# Patient Record
Sex: FEMALE | Race: WHITE | NOT HISPANIC OR LATINO | Employment: OTHER | URBAN - METROPOLITAN AREA
[De-identification: names, ages, dates, MRNs, and addresses within clinical notes are randomized per-mention and may not be internally consistent; named-entity substitution may affect disease eponyms.]

---

## 2017-03-03 RX ORDER — TRIAMTERENE AND HYDROCHLOROTHIAZIDE 37.5; 25 MG/1; MG/1
CAPSULE ORAL
Qty: 90 CAPSULE | Refills: 0 | OUTPATIENT
Start: 2017-03-03

## 2017-03-07 ENCOUNTER — OFFICE VISIT (OUTPATIENT)
Dept: FAMILY MEDICINE CLINIC | Facility: CLINIC | Age: 67
End: 2017-03-07

## 2017-03-07 VITALS
OXYGEN SATURATION: 95 % | TEMPERATURE: 98.4 F | BODY MASS INDEX: 29.89 KG/M2 | HEIGHT: 66 IN | RESPIRATION RATE: 16 BRPM | SYSTOLIC BLOOD PRESSURE: 120 MMHG | DIASTOLIC BLOOD PRESSURE: 78 MMHG | HEART RATE: 67 BPM | WEIGHT: 186 LBS

## 2017-03-07 DIAGNOSIS — R94.6 THYROID FUNCTION TEST ABNORMAL: ICD-10-CM

## 2017-03-07 DIAGNOSIS — R60.0 LOCALIZED EDEMA: ICD-10-CM

## 2017-03-07 DIAGNOSIS — I10 BENIGN ESSENTIAL HYPERTENSION: Primary | ICD-10-CM

## 2017-03-07 PROCEDURE — 99213 OFFICE O/P EST LOW 20 MIN: CPT | Performed by: PHYSICIAN ASSISTANT

## 2017-03-07 RX ORDER — METOPROLOL SUCCINATE 50 MG/1
50 TABLET, EXTENDED RELEASE ORAL DAILY
Qty: 90 TABLET | Refills: 1 | Status: SHIPPED | OUTPATIENT
Start: 2017-03-07 | End: 2017-09-07 | Stop reason: SDUPTHER

## 2017-03-07 RX ORDER — TRIAMTERENE AND HYDROCHLOROTHIAZIDE 37.5; 25 MG/1; MG/1
1 CAPSULE ORAL EVERY MORNING
Qty: 90 CAPSULE | Refills: 1 | Status: SHIPPED | OUTPATIENT
Start: 2017-03-07 | End: 2017-08-31 | Stop reason: SDUPTHER

## 2017-03-07 NOTE — PROGRESS NOTES
Subjective   Onelia Alegre is a 66 y.o. female.     History of Present Illness   Onelia Alegre 66 y.o. female who presents today for routine follow up check and medication refills.  she has a history of   Patient Active Problem List   Diagnosis   • Benign essential hypertension   • Osteopenia   • Excess weight   • Localized edema   • Thyroid function test abnormal   .  Since the last visit, she has overall felt well.  She has Hypertenision and is well controlled on medication.  she has been compliant with current medications have reviewed them.  The patient denies medication side effects.    Edema is responding to Dyazide  I still need those labs  She is on Toprol for palpitations      The following portions of the patient's history were reviewed and updated as appropriate: allergies, current medications, past family history, past medical history, past social history, past surgical history and problem list.    Review of Systems   Constitutional: Negative for activity change, appetite change and unexpected weight change.   HENT: Negative for nosebleeds and trouble swallowing.    Eyes: Negative for pain and visual disturbance.   Respiratory: Negative for chest tightness, shortness of breath and wheezing.    Cardiovascular: Negative for chest pain and palpitations.   Gastrointestinal: Negative for abdominal pain and blood in stool.   Endocrine: Negative.    Genitourinary: Negative for difficulty urinating and hematuria.   Musculoskeletal: Negative for joint swelling.   Skin: Negative for color change and rash.   Allergic/Immunologic: Negative.    Neurological: Negative for syncope and speech difficulty.   Hematological: Negative for adenopathy.   Psychiatric/Behavioral: Negative for agitation and confusion.   All other systems reviewed and are negative.      Objective   Physical Exam   Constitutional: She is oriented to person, place, and time. She appears well-developed and well-nourished. No distress.    HENT:   Head: Normocephalic and atraumatic.   Eyes: Conjunctivae and EOM are normal. Pupils are equal, round, and reactive to light. Right eye exhibits no discharge. Left eye exhibits no discharge. No scleral icterus.   Neck: Normal range of motion. Neck supple. No tracheal deviation present. No thyromegaly present.   Cardiovascular: Normal rate, regular rhythm, normal heart sounds, intact distal pulses and normal pulses.  Exam reveals no gallop.    No murmur heard.  Trace pedal edema = bilat   Pulmonary/Chest: Effort normal and breath sounds normal. No respiratory distress. She has no wheezes. She has no rales.   Musculoskeletal: Normal range of motion.   Neurological: She is alert and oriented to person, place, and time. She exhibits normal muscle tone. Coordination normal.   Skin: Skin is warm. No rash noted. No erythema. No pallor.   Psychiatric: She has a normal mood and affect. Her behavior is normal. Judgment and thought content normal.   Nursing note and vitals reviewed.      Assessment/Plan   Problems Addressed this Visit        Cardiovascular and Mediastinum    Benign essential hypertension - Primary    Relevant Medications    metoprolol succinate XL (TOPROL-XL) 50 MG 24 hr tablet    triamterene-hydrochlorothiazide (DYAZIDE) 37.5-25 MG per capsule       Other    Localized edema    Thyroid function test abnormal

## 2017-03-07 NOTE — PATIENT INSTRUCTIONS
Low glycemic index diet  Exercise 30 minutes most days of the week  Make sure you get results on any labs or tests we ordered today  We discussed medications and how to take them as prescribed  Sleep 6-8 hours each night if possible  If you have not signed up for [employer], please activate your code ASAP from your After Visit Summary today    LDL goal <100  LDL goal if heart disease <70  HDL goal >60  Triglyceride goal <150  BP goal =<130/80  Fasting glucose <100

## 2017-03-09 DIAGNOSIS — I10 BENIGN ESSENTIAL HYPERTENSION: ICD-10-CM

## 2017-03-09 DIAGNOSIS — E78.2 MIXED HYPERLIPIDEMIA: ICD-10-CM

## 2017-03-09 DIAGNOSIS — R60.0 LOCALIZED EDEMA: ICD-10-CM

## 2017-03-09 DIAGNOSIS — R94.6 THYROID FUNCTION TEST ABNORMAL: Primary | ICD-10-CM

## 2017-03-09 RX ORDER — ATORVASTATIN CALCIUM 10 MG/1
10 TABLET, FILM COATED ORAL DAILY
Qty: 30 TABLET | Refills: 11 | Status: SHIPPED | OUTPATIENT
Start: 2017-03-09 | End: 2017-09-07 | Stop reason: SDUPTHER

## 2017-04-12 ENCOUNTER — OFFICE VISIT (OUTPATIENT)
Dept: ENDOCRINOLOGY | Age: 67
End: 2017-04-12

## 2017-04-12 VITALS
RESPIRATION RATE: 16 BRPM | WEIGHT: 188.4 LBS | HEIGHT: 66 IN | DIASTOLIC BLOOD PRESSURE: 84 MMHG | BODY MASS INDEX: 30.28 KG/M2 | SYSTOLIC BLOOD PRESSURE: 128 MMHG

## 2017-04-12 DIAGNOSIS — E78.5 DYSLIPIDEMIA: ICD-10-CM

## 2017-04-12 DIAGNOSIS — M85.80 OSTEOPENIA: ICD-10-CM

## 2017-04-12 DIAGNOSIS — E03.9 PRIMARY HYPOTHYROIDISM: ICD-10-CM

## 2017-04-12 DIAGNOSIS — E55.9 VITAMIN D DEFICIENCY: ICD-10-CM

## 2017-04-12 DIAGNOSIS — I10 BENIGN ESSENTIAL HYPERTENSION: Primary | ICD-10-CM

## 2017-04-12 DIAGNOSIS — E66.3 EXCESS WEIGHT: ICD-10-CM

## 2017-04-12 DIAGNOSIS — E55.9 VITAMIN D DEFICIENCY: Primary | ICD-10-CM

## 2017-04-12 PROBLEM — E88.810 METABOLIC SYNDROME: Status: RESOLVED | Noted: 2017-04-12 | Resolved: 2017-04-12

## 2017-04-12 PROBLEM — R73.03 PREDIABETES: Status: ACTIVE | Noted: 2017-04-12

## 2017-04-12 PROBLEM — E88.81 METABOLIC SYNDROME: Status: RESOLVED | Noted: 2017-04-12 | Resolved: 2017-04-12

## 2017-04-12 PROBLEM — E88.81 METABOLIC SYNDROME: Status: ACTIVE | Noted: 2017-04-12

## 2017-04-12 PROBLEM — R73.03 PREDIABETES: Status: RESOLVED | Noted: 2017-04-12 | Resolved: 2017-04-12

## 2017-04-12 PROBLEM — E88.810 METABOLIC SYNDROME: Status: ACTIVE | Noted: 2017-04-12

## 2017-04-12 PROCEDURE — 99204 OFFICE O/P NEW MOD 45 MIN: CPT | Performed by: INTERNAL MEDICINE

## 2017-04-12 RX ORDER — LEVOTHYROXINE SODIUM 100 MCG
100 TABLET ORAL DAILY
Qty: 30 TABLET | Refills: 5 | Status: SHIPPED | OUTPATIENT
Start: 2017-04-12 | End: 2017-04-14

## 2017-04-12 NOTE — PROGRESS NOTES
"Subjective   Onelia Alegre is a 66 y.o. female seen as a new patient for abnormal thyroid function. She is having weight gain, fatigue, bulge on her neck, hoarseness in voice, clearing throat, frequency to use the restroom during the night.     History of Present Illness this is a 66-year-old female who has been referred by Ms. Raven Travis her primary care provider for further evaluation and treatment on of the abnormal thyroid function.  She has been complaining of progressive and chronic fatigue and weight gain and some degree of frustration for not being able to lose weight.  Her family history is positive for mother having had the hypothyroidism.  Her father had the: Cancer and her mother had the year during cancer.  She is on the mother of 2 healthy young lady's and the no history of gestational diabetes.  She was treated with the hormone replacement therapy after her menopause but after a few years it was a stopped.  For no clear reason.   /84  Resp 16  Ht 65.5\" (166.4 cm)  Wt 188 lb 6.4 oz (85.5 kg)  BMI 30.87 kg/m2  Allergies   Allergen Reactions   • Actonel  [Risedronate Sodium]      Other reaction(s): ARTHRALGIAS   • Penicillins      Other reaction(s): JOINT PAIN   • Sulfa Antibiotics Rash       Current Outpatient Prescriptions:   •  aspirin 81 MG EC tablet, Take 81 mg by mouth daily., Disp: , Rfl:   •  atorvastatin (LIPITOR) 10 MG tablet, Take 1 tablet by mouth Daily. For cholesterol, Disp: 30 tablet, Rfl: 11  •  Calcium Carbonate-Vitamin D (CALCIUM-VITAMIN D) 600-200 MG-UNIT capsule, Take by mouth., Disp: , Rfl:   •  cetirizine (ZyrTEC) 10 MG tablet, Take 10 mg by mouth daily. Take one tablet daily as needed, Disp: , Rfl:   •  cholecalciferol (VITAMIN D3) 1000 UNITS tablet, Take 2,000 Units by mouth Daily., Disp: , Rfl:   •  melatonin tablet, Take by mouth., Disp: , Rfl:   •  metoprolol succinate XL (TOPROL-XL) 50 MG 24 hr tablet, Take 1 tablet by mouth Daily. For BP and palpitations, Disp: " 90 tablet, Rfl: 1  •  Multiple Vitamin (MULTIVITAMIN) tablet, Take 1 tablet by mouth daily., Disp: , Rfl:   •  triamterene-hydrochlorothiazide (DYAZIDE) 37.5-25 MG per capsule, Take 1 capsule by mouth Every Morning. For BP and edema (still take Toprol XL), Disp: 90 capsule, Rfl: 1      The following portions of the patient's history were reviewed and updated as appropriate: allergies, current medications, past family history, past medical history, past social history, past surgical history and problem list.    Review of Systems   Constitutional: Positive for fatigue and unexpected weight change.   HENT: Positive for voice change.    Eyes: Negative.    Respiratory: Positive for cough.    Cardiovascular: Negative.    Gastrointestinal: Negative.    Endocrine: Negative.    Genitourinary: Positive for frequency (waking up during the night).   Musculoskeletal: Negative.    Skin: Negative.    Allergic/Immunologic: Negative.    Neurological: Negative.    Hematological: Negative.    Psychiatric/Behavioral: Positive for sleep disturbance.       Objective   Physical Exam   Constitutional: She is oriented to person, place, and time. She appears well-developed and well-nourished. No distress.   HENT:   Head: Normocephalic and atraumatic.   Right Ear: External ear normal.   Left Ear: External ear normal.   Nose: Nose normal.   Mouth/Throat: Oropharynx is clear and moist. No oropharyngeal exudate.   Eyes: Conjunctivae and EOM are normal. Pupils are equal, round, and reactive to light. Right eye exhibits no discharge. Left eye exhibits no discharge. No scleral icterus.   Neck: Normal range of motion. Neck supple. No JVD present. No tracheal deviation present. Thyromegaly present.   Very find the nodular goiter.  It most freely with swallowing and is not associated with tenderness or lymphadenopathy.   Cardiovascular: Normal rate, regular rhythm, normal heart sounds and intact distal pulses.  Exam reveals no gallop and no friction  rub.    No murmur heard.  Pulmonary/Chest: Effort normal and breath sounds normal. No stridor. No respiratory distress. She has no wheezes. She has no rales. She exhibits no tenderness.   Abdominal: Soft. Bowel sounds are normal. She exhibits no distension and no mass. There is no tenderness. There is no rebound and no guarding. No hernia.   Musculoskeletal: Normal range of motion. She exhibits no edema, tenderness or deformity.   Lymphadenopathy:     She has no cervical adenopathy.   Neurological: She is alert and oriented to person, place, and time. She has normal reflexes. She displays normal reflexes. No cranial nerve deficit. She exhibits normal muscle tone. Coordination normal.   Skin: Skin is warm and dry. No rash noted. She is not diaphoretic. No erythema. No pallor.   Psychiatric: She has a normal mood and affect. Her behavior is normal. Judgment and thought content normal.   Nursing note and vitals reviewed.        Assessment/Plan   Diagnoses and all orders for this visit:    Benign essential hypertension  -     T3, Free  -     T4 & TSH ([facility])  -     T4, Free  -     Vitamin D 25 Hydroxy  -     Uric Acid  -     Comprehensive Metabolic Panel  -     Luteinizing Hormone  -     Follicle Stimulating Hormone  -     ACTH  -     Cortisol  -     T3, Free; Future  -     T4 & TSH ([facility]); Future  -     T4, Free; Future  -     Thyroglobulin With Anti-TG; Future  -     Uric Acid; Future  -     Vitamin D 25 Hydroxy; Future  -     Lipid Panel; Future  -     Comprehensive Metabolic Panel; Future    Excess weight  -     T3, Free  -     T4 & TSH ([facility])  -     T4, Free  -     Vitamin D 25 Hydroxy  -     Uric Acid  -     Comprehensive Metabolic Panel  -     Luteinizing Hormone  -     Follicle Stimulating Hormone  -     ACTH  -     Cortisol  -     T3, Free; Future  -     T4 & TSH ([facility]); Future  -     T4, Free; Future  -     Thyroglobulin With Anti-TG; Future  -     Uric Acid; Future  -     Vitamin D 25 Hydroxy;  Future  -     Lipid Panel; Future  -     Comprehensive Metabolic Panel; Future    Osteopenia  -     T3, Free  -     T4 & TSH ([facility])  -     T4, Free  -     Vitamin D 25 Hydroxy  -     Uric Acid  -     Comprehensive Metabolic Panel  -     Luteinizing Hormone  -     Follicle Stimulating Hormone  -     ACTH  -     Cortisol  -     T3, Free; Future  -     T4 & TSH ([facility]); Future  -     T4, Free; Future  -     Thyroglobulin With Anti-TG; Future  -     Uric Acid; Future  -     Vitamin D 25 Hydroxy; Future  -     Lipid Panel; Future  -     Comprehensive Metabolic Panel; Future    Vitamin D deficiency  -     T3, Free  -     T4 & TSH ([facility])  -     T4, Free  -     Vitamin D 25 Hydroxy  -     Uric Acid  -     Comprehensive Metabolic Panel  -     Luteinizing Hormone  -     Follicle Stimulating Hormone  -     ACTH  -     Cortisol  -     T3, Free; Future  -     T4 & TSH ([facility]); Future  -     T4, Free; Future  -     Thyroglobulin With Anti-TG; Future  -     Uric Acid; Future  -     Vitamin D 25 Hydroxy; Future  -     Lipid Panel; Future  -     Comprehensive Metabolic Panel; Future    Dyslipidemia  -     T3, Free  -     T4 & TSH ([facility])  -     T4, Free  -     Vitamin D 25 Hydroxy  -     Uric Acid  -     Comprehensive Metabolic Panel  -     Luteinizing Hormone  -     Follicle Stimulating Hormone  -     ACTH  -     Cortisol  -     T3, Free; Future  -     T4 & TSH ([facility]); Future  -     T4, Free; Future  -     Thyroglobulin With Anti-TG; Future  -     Uric Acid; Future  -     Vitamin D 25 Hydroxy; Future  -     Lipid Panel; Future  -     Comprehensive Metabolic Panel; Future    Primary hypothyroidism  -     T3, Free  -     T4 & TSH ([facility])  -     T4, Free  -     Vitamin D 25 Hydroxy  -     Uric Acid  -     Comprehensive Metabolic Panel  -     Luteinizing Hormone  -     Follicle Stimulating Hormone  -     ACTH  -     Cortisol  -     T3, Free; Future  -     T4 & TSH ([facility]); Future  -     T4, Free; Future  -      Thyroglobulin With Anti-TG; Future  -     Uric Acid; Future  -     Vitamin D 25 Hydroxy; Future  -     Lipid Panel; Future  -     Comprehensive Metabolic Panel; Future    Other orders  -     SYNTHROID 100 MCG tablet; Take 1 tablet by mouth Daily. On empty stomach  -     estrogen, conjugated,-medroxyprogesterone (PREMPRO) 0.625-2.5 MG per tablet; Take 1 tablet by mouth Daily.               In summary I saw and examined this 66-year-old female for above-mentioned problems.  I reviewed her laboratory evaluation of 05/23/2016 and 03/08/2017 as well as the thyroid ultrasound of the 06/17/2016 where he shows existence of a very small nodules on both lobes of her thyroid.  and at this point we will go ahead and obtain a more comprehensive laboratory evaluation and once the results come back we will go ahead and call for any additional modification or further workup.  By virtue of the fact that she had elevated levels of TSH on 2 separate occasions I am going to go ahead and start her on Synthroid 100 µg daily. she will see Ms. Marie Nichols in 4 months with laboratory evaluation prior to her office visit.

## 2017-04-13 LAB
25(OH)D3+25(OH)D2 SERPL-MCNC: 59.2 NG/ML (ref 30–100)
ACTH PLAS-MCNC: 14.3 PG/ML (ref 7.2–63.3)
ALBUMIN SERPL-MCNC: 4.6 G/DL (ref 3.5–5.2)
ALBUMIN/GLOB SERPL: 1.8 G/DL
ALP SERPL-CCNC: 90 U/L (ref 39–117)
ALT SERPL-CCNC: 19 U/L (ref 1–33)
AST SERPL-CCNC: 22 U/L (ref 1–32)
BILIRUB SERPL-MCNC: 1.1 MG/DL (ref 0.1–1.2)
BUN SERPL-MCNC: 19 MG/DL (ref 8–23)
BUN/CREAT SERPL: 18.8 (ref 7–25)
CALCIUM SERPL-MCNC: 9.9 MG/DL (ref 8.6–10.5)
CHLORIDE SERPL-SCNC: 98 MMOL/L (ref 98–107)
CO2 SERPL-SCNC: 28.1 MMOL/L (ref 22–29)
CORTIS SERPL-MCNC: 11.2 UG/DL
CREAT SERPL-MCNC: 1.01 MG/DL (ref 0.57–1)
FSH SERPL-ACNC: 99.3 MIU/ML
GLOBULIN SER CALC-MCNC: 2.6 GM/DL
GLUCOSE SERPL-MCNC: 103 MG/DL (ref 65–99)
LH SERPL-ACNC: 49.4 MIU/ML
POTASSIUM SERPL-SCNC: 4.4 MMOL/L (ref 3.5–5.2)
PROT SERPL-MCNC: 7.2 G/DL (ref 6–8.5)
SODIUM SERPL-SCNC: 139 MMOL/L (ref 136–145)
T3FREE SERPL-MCNC: 3.6 PG/ML (ref 2–4.4)
T4 FREE SERPL-MCNC: 1.22 NG/DL (ref 0.93–1.7)
T4 SERPL-MCNC: 7.23 MCG/DL (ref 4.5–11.7)
TSH SERPL DL<=0.005 MIU/L-ACNC: 9.2 MIU/ML (ref 0.27–4.2)
URATE SERPL-MCNC: 6.3 MG/DL (ref 2.4–5.7)

## 2017-04-14 RX ORDER — LEVOTHYROXINE SODIUM 125 MCG
125 TABLET ORAL DAILY
Qty: 30 TABLET | Refills: 5 | Status: SHIPPED | OUTPATIENT
Start: 2017-04-14 | End: 2017-09-12 | Stop reason: SDUPTHER

## 2017-04-14 RX ORDER — ALLOPURINOL 100 MG/1
100 TABLET ORAL DAILY
Qty: 30 TABLET | Refills: 5 | Status: SHIPPED | OUTPATIENT
Start: 2017-04-14 | End: 2017-09-12 | Stop reason: SDUPTHER

## 2017-04-21 RX ORDER — ESTRADIOL 1 MG/1
1 TABLET ORAL DAILY
Qty: 30 TABLET | Refills: 5 | Status: SHIPPED | OUTPATIENT
Start: 2017-04-21 | End: 2017-09-12 | Stop reason: SDUPTHER

## 2017-05-15 LAB
ALBUMIN SERPL-MCNC: 4.3 G/DL (ref 3.5–5.2)
ALBUMIN/GLOB SERPL: 1.7 G/DL
ALP SERPL-CCNC: 84 U/L (ref 39–117)
ALT SERPL-CCNC: 18 U/L (ref 1–33)
AST SERPL-CCNC: 20 U/L (ref 1–32)
BILIRUB SERPL-MCNC: 0.9 MG/DL (ref 0.1–1.2)
BUN SERPL-MCNC: 16 MG/DL (ref 8–23)
BUN/CREAT SERPL: 16.8 (ref 7–25)
CALCIUM SERPL-MCNC: 10 MG/DL (ref 8.6–10.5)
CHLORIDE SERPL-SCNC: 102 MMOL/L (ref 98–107)
CHOLEST SERPL-MCNC: 123 MG/DL (ref 0–200)
CO2 SERPL-SCNC: 27.5 MMOL/L (ref 22–29)
CREAT SERPL-MCNC: 0.95 MG/DL (ref 0.57–1)
GLOBULIN SER CALC-MCNC: 2.6 GM/DL
GLUCOSE SERPL-MCNC: 89 MG/DL (ref 65–99)
HDLC SERPL-MCNC: 56 MG/DL (ref 40–60)
LDLC SERPL CALC-MCNC: 51 MG/DL (ref 0–100)
POTASSIUM SERPL-SCNC: 4.4 MMOL/L (ref 3.5–5.2)
PROT SERPL-MCNC: 6.9 G/DL (ref 6–8.5)
SODIUM SERPL-SCNC: 142 MMOL/L (ref 136–145)
TRIGL SERPL-MCNC: 79 MG/DL (ref 0–150)
VLDLC SERPL CALC-MCNC: 15.8 MG/DL (ref 5–40)

## 2017-08-02 ENCOUNTER — RESULTS ENCOUNTER (OUTPATIENT)
Dept: ENDOCRINOLOGY | Age: 67
End: 2017-08-02

## 2017-08-02 DIAGNOSIS — I10 BENIGN ESSENTIAL HYPERTENSION: ICD-10-CM

## 2017-08-02 DIAGNOSIS — M85.80 OSTEOPENIA: ICD-10-CM

## 2017-08-02 DIAGNOSIS — E55.9 VITAMIN D DEFICIENCY: ICD-10-CM

## 2017-08-02 DIAGNOSIS — E03.9 PRIMARY HYPOTHYROIDISM: ICD-10-CM

## 2017-08-02 DIAGNOSIS — E78.5 DYSLIPIDEMIA: ICD-10-CM

## 2017-08-02 DIAGNOSIS — E66.3 EXCESS WEIGHT: ICD-10-CM

## 2017-08-26 DIAGNOSIS — E55.9 VITAMIN D DEFICIENCY: ICD-10-CM

## 2017-08-26 DIAGNOSIS — E03.9 PRIMARY HYPOTHYROIDISM: Primary | ICD-10-CM

## 2017-08-26 DIAGNOSIS — E78.5 DYSLIPIDEMIA: ICD-10-CM

## 2017-08-30 ENCOUNTER — LAB (OUTPATIENT)
Dept: ENDOCRINOLOGY | Age: 67
End: 2017-08-30

## 2017-08-30 DIAGNOSIS — E78.5 DYSLIPIDEMIA: ICD-10-CM

## 2017-08-30 DIAGNOSIS — E55.9 VITAMIN D DEFICIENCY: ICD-10-CM

## 2017-08-30 DIAGNOSIS — E03.9 PRIMARY HYPOTHYROIDISM: ICD-10-CM

## 2017-08-31 RX ORDER — TRIAMTERENE AND HYDROCHLOROTHIAZIDE 37.5; 25 MG/1; MG/1
CAPSULE ORAL
Qty: 30 CAPSULE | Refills: 0 | Status: SHIPPED | OUTPATIENT
Start: 2017-08-31 | End: 2017-09-07 | Stop reason: SDUPTHER

## 2017-09-01 LAB
25(OH)D3+25(OH)D2 SERPL-MCNC: 72.5 NG/ML (ref 30–100)
ALBUMIN SERPL-MCNC: 4.6 G/DL (ref 3.5–5.2)
ALBUMIN/GLOB SERPL: 1.8 G/DL
ALP SERPL-CCNC: 100 U/L (ref 39–117)
ALT SERPL-CCNC: 17 U/L (ref 1–33)
AST SERPL-CCNC: 18 U/L (ref 1–32)
BILIRUB SERPL-MCNC: 1.3 MG/DL (ref 0.1–1.2)
BUN SERPL-MCNC: 17 MG/DL (ref 8–23)
BUN/CREAT SERPL: 16.7 (ref 7–25)
CALCIUM SERPL-MCNC: 9.9 MG/DL (ref 8.6–10.5)
CHLORIDE SERPL-SCNC: 99 MMOL/L (ref 98–107)
CHOLEST SERPL-MCNC: 138 MG/DL (ref 0–200)
CO2 SERPL-SCNC: 25.7 MMOL/L (ref 22–29)
CREAT SERPL-MCNC: 1.02 MG/DL (ref 0.57–1)
FT4I SERPL CALC-MCNC: 3.3 (ref 1.2–4.9)
GLOBULIN SER CALC-MCNC: 2.6 GM/DL
GLUCOSE SERPL-MCNC: 98 MG/DL (ref 65–99)
HDLC SERPL-MCNC: 58 MG/DL (ref 40–60)
LDLC SERPL CALC-MCNC: 55 MG/DL (ref 0–100)
POTASSIUM SERPL-SCNC: 4.4 MMOL/L (ref 3.5–5.2)
PROT SERPL-MCNC: 7.2 G/DL (ref 6–8.5)
SODIUM SERPL-SCNC: 141 MMOL/L (ref 136–145)
T3FREE SERPL-MCNC: 3.4 PG/ML (ref 2–4.4)
T3RU NFR SERPL: 33 % (ref 24–39)
T4 FREE SERPL-MCNC: 2.13 NG/DL (ref 0.93–1.7)
T4 SERPL-MCNC: 10.1 UG/DL (ref 4.5–12)
THYROGLOB AB SERPL-ACNC: <1 IU/ML
THYROGLOB SERPL-MCNC: 15.8 NG/ML
THYROGLOB SERPL-MCNC: NORMAL NG/ML
TRIGL SERPL-MCNC: 125 MG/DL (ref 0–150)
TSH SERPL DL<=0.005 MIU/L-ACNC: 0.99 UIU/ML (ref 0.45–4.5)
VLDLC SERPL CALC-MCNC: 25 MG/DL (ref 5–40)

## 2017-09-07 ENCOUNTER — OFFICE VISIT (OUTPATIENT)
Dept: FAMILY MEDICINE CLINIC | Facility: CLINIC | Age: 67
End: 2017-09-07

## 2017-09-07 VITALS
TEMPERATURE: 98 F | SYSTOLIC BLOOD PRESSURE: 110 MMHG | HEART RATE: 86 BPM | HEIGHT: 66 IN | BODY MASS INDEX: 29.65 KG/M2 | OXYGEN SATURATION: 97 % | RESPIRATION RATE: 16 BRPM | WEIGHT: 184.5 LBS | DIASTOLIC BLOOD PRESSURE: 70 MMHG

## 2017-09-07 DIAGNOSIS — Z23 ENCOUNTER FOR IMMUNIZATION: ICD-10-CM

## 2017-09-07 DIAGNOSIS — E78.5 DYSLIPIDEMIA: ICD-10-CM

## 2017-09-07 DIAGNOSIS — I10 BENIGN ESSENTIAL HYPERTENSION: ICD-10-CM

## 2017-09-07 DIAGNOSIS — Z12.31 ENCOUNTER FOR SCREENING MAMMOGRAM FOR BREAST CANCER: Primary | ICD-10-CM

## 2017-09-07 PROCEDURE — 90686 IIV4 VACC NO PRSV 0.5 ML IM: CPT | Performed by: PHYSICIAN ASSISTANT

## 2017-09-07 PROCEDURE — G0008 ADMIN INFLUENZA VIRUS VAC: HCPCS | Performed by: PHYSICIAN ASSISTANT

## 2017-09-07 PROCEDURE — G0438 PPPS, INITIAL VISIT: HCPCS | Performed by: PHYSICIAN ASSISTANT

## 2017-09-07 PROCEDURE — 99213 OFFICE O/P EST LOW 20 MIN: CPT | Performed by: PHYSICIAN ASSISTANT

## 2017-09-07 RX ORDER — METOPROLOL SUCCINATE 50 MG/1
50 TABLET, EXTENDED RELEASE ORAL DAILY
Qty: 90 TABLET | Refills: 1 | Status: SHIPPED | OUTPATIENT
Start: 2017-09-07 | End: 2017-09-12 | Stop reason: SDUPTHER

## 2017-09-07 RX ORDER — TRIAMTERENE AND HYDROCHLOROTHIAZIDE 37.5; 25 MG/1; MG/1
1 CAPSULE ORAL EVERY MORNING
Qty: 90 CAPSULE | Refills: 1 | Status: SHIPPED | OUTPATIENT
Start: 2017-09-07 | End: 2018-03-24 | Stop reason: SDUPTHER

## 2017-09-07 RX ORDER — ATORVASTATIN CALCIUM 10 MG/1
10 TABLET, FILM COATED ORAL DAILY
Qty: 90 TABLET | Refills: 3 | Status: SHIPPED | OUTPATIENT
Start: 2017-09-07 | End: 2017-09-12 | Stop reason: SDUPTHER

## 2017-09-07 NOTE — PROGRESS NOTES
QUICK REFERENCE INFORMATION:  The ABCs of the Annual Wellness Visit    Initial Medicare Wellness Visit    HEALTH RISK ASSESSMENT    1950    Recent Hospitalizations:  No hospitalization(s) within the last year..        Current Medical Providers:  Patient Care Team:  Raven Travis PA-C as PCP - General (Family Medicine)        Smoking Status:  History   Smoking Status   • Never Smoker   Smokeless Tobacco   • Never Used       Alcohol Consumption:  History   Alcohol Use   • Yes     Comment: social       Depression Screen:   PHQ-2/PHQ-9 Depression Screening 9/7/2017   Little interest or pleasure in doing things 0   Feeling down, depressed, or hopeless 0   Total Score 0       Health Habits and Functional and Cognitive Screening:  Functional & Cognitive Status 9/7/2017   Do you have difficulty preparing food and eating? No   Do you have difficulty bathing yourself? No   Do you have difficulty getting dressed? No   Do you have difficulty using the toilet? No   Do you have difficulty moving around from place to place? No   In the past year have you fallen or experienced a near fall? No   Do you need help using the phone?  No   Are you deaf or do you have serious difficulty hearing?  No   Do you need help with transportation? No   Do you need help shopping? No   Do you need help preparing meals?  No   Do you need help with housework?  No   Do you need help with laundry? No   Do you need help taking your medications? No   Do you need help managing money? No   Do you have difficulty concentrating, remembering or making decisions? No       Health Habits  Current Diet: Low Fat Diet  Dental Exam: Up to date  Eye Exam: Up to date  Exercise (times per week): 2 times per week  Current Exercise Activities Include: Walking          Does the patient have evidence of cognitive impairment? No    Asiprin use counseling: Taking ASA appropriately as indicated      Recent Lab Results:    Visual Acuity:  No exam data  present    Age-appropriate Screening Schedule:  Refer to the list below for future screening recommendations based on patient's age, sex and/or medical conditions. Orders for these recommended tests are listed in the plan section. The patient has been provided with a written plan.    Health Maintenance   Topic Date Due   • INFLUENZA VACCINE  10/07/2017 (Originally 8/1/2017)   • MAMMOGRAM  11/07/2017 (Originally 8/25/2017)   • PNEUMOCOCCAL VACCINES (65+ LOW/MEDIUM RISK) (2 of 2 - PPSV23) 12/07/2017   • COLONOSCOPY  01/15/2018   • DXA SCAN  07/20/2018   • LIPID PANEL  08/30/2018   • TDAP/TD VACCINES (3 - Td) 12/07/2026   • ZOSTER VACCINE  Completed        Subjective   History of Present Illness    Onelia Alegre is a 66 y.o. female who presents for an Annual Wellness Visit.    The following portions of the patient's history were reviewed and updated as appropriate: allergies, current medications, past family history, past medical history, past social history, past surgical history and problem list.    Outpatient Medications Prior to Visit   Medication Sig Dispense Refill   • allopurinol (ZYLOPRIM) 100 MG tablet Take 1 tablet by mouth Daily. 30 tablet 5   • aspirin 81 MG EC tablet Take 81 mg by mouth daily.     • Calcium Carbonate-Vitamin D (CALCIUM-VITAMIN D) 600-200 MG-UNIT capsule Take by mouth.     • cetirizine (ZyrTEC) 10 MG tablet Take 10 mg by mouth daily. Take one tablet daily as needed     • cholecalciferol (VITAMIN D3) 1000 UNITS tablet Take 2,000 Units by mouth Daily.     • estradiol (ESTRACE) 1 MG tablet Take 1 tablet by mouth Daily. 30 tablet 5   • melatonin tablet Take by mouth.     • Multiple Vitamin (MULTIVITAMIN) tablet Take 1 tablet by mouth daily.     • progesterone (PROMETRIUM) 200 MG capsule Take 1 capsule by mouth Daily. 30 capsule 5   • SYNTHROID 125 MCG tablet Take 1 tablet by mouth Daily. On empty stomach 30 tablet 5   • atorvastatin (LIPITOR) 10 MG tablet Take 1 tablet by mouth Daily. For  "cholesterol 30 tablet 11   • metoprolol succinate XL (TOPROL-XL) 50 MG 24 hr tablet Take 1 tablet by mouth Daily. For BP and palpitations 90 tablet 1   • triamterene-hydrochlorothiazide (DYAZIDE) 37.5-25 MG per capsule TAKE ONE CAPSULE BY MOUTH EVERY MORNING FOR BLOOD PRESSURE AND EDEMA 30 capsule 0     No facility-administered medications prior to visit.        Patient Active Problem List   Diagnosis   • Benign essential hypertension   • Osteopenia   • Excess weight   • Localized edema   • Thyroid function test abnormal   • Primary hypothyroidism   • Vitamin D deficiency   • Dyslipidemia       Advance Care Planning:  has an advance directive - a copy HAS NOT been provided. Have asked the patient to send this to us to add to record.    Identification of Risk Factors:  Risk factors include: weight .    Review of Systems    Compared to one year ago, the patient feels her physical health is the same.  Compared to one year ago, the patient feels her mental health is the same.    Objective     Physical Exam    Vitals:    09/07/17 1019   BP: 110/70   BP Location: Left arm   Patient Position: Sitting   Cuff Size: Large Adult   Pulse: 86   Resp: 16   Temp: 98 °F (36.7 °C)   TempSrc: Oral   SpO2: 97%   Weight: 184 lb 8 oz (83.7 kg)   Height: 65.5\" (166.4 cm)   PainSc: 0-No pain       Body mass index is 30.24 kg/(m^2).  Discussed the patient's BMI with her. The BMI is above average; BMI management plan is completed.    Assessment/Plan   Patient Self-Management and Personalized Health Advice  The patient has been provided with information about: exercise and weight management and preventive services including:   · Screening mammography, referral placed.    Visit Diagnoses:    ICD-10-CM ICD-9-CM   1. Encounter for screening mammogram for breast cancer Z12.31 V76.12   2. Dyslipidemia E78.5 272.4   3. Benign essential hypertension I10 401.1   4. Encounter for immunization  Z23 V03.89       Orders Placed This Encounter "   Procedures   • Mammo Screening Bilateral With CAD     Standing Status:   Future     Standing Expiration Date:   9/7/2018     Scheduling Instructions:      [facility] [address]     Order Specific Question:   Reason for Exam:     Answer:   Yearly exam   • Flu Vaccine Quad PF 3YR+       Outpatient Encounter Prescriptions as of 9/7/2017   Medication Sig Dispense Refill   • allopurinol (ZYLOPRIM) 100 MG tablet Take 1 tablet by mouth Daily. 30 tablet 5   • aspirin 81 MG EC tablet Take 81 mg by mouth daily.     • atorvastatin (LIPITOR) 10 MG tablet Take 1 tablet by mouth Daily. For cholesterol 90 tablet 3   • Calcium Carbonate-Vitamin D (CALCIUM-VITAMIN D) 600-200 MG-UNIT capsule Take by mouth.     • cetirizine (ZyrTEC) 10 MG tablet Take 10 mg by mouth daily. Take one tablet daily as needed     • cholecalciferol (VITAMIN D3) 1000 UNITS tablet Take 2,000 Units by mouth Daily.     • estradiol (ESTRACE) 1 MG tablet Take 1 tablet by mouth Daily. 30 tablet 5   • melatonin tablet Take by mouth.     • metoprolol succinate XL (TOPROL-XL) 50 MG 24 hr tablet Take 1 tablet by mouth Daily. For BP and palpitations 90 tablet 1   • Multiple Vitamin (MULTIVITAMIN) tablet Take 1 tablet by mouth daily.     • progesterone (PROMETRIUM) 200 MG capsule Take 1 capsule by mouth Daily. 30 capsule 5   • SYNTHROID 125 MCG tablet Take 1 tablet by mouth Daily. On empty stomach 30 tablet 5   • triamterene-hydrochlorothiazide (DYAZIDE) 37.5-25 MG per capsule Take 1 capsule by mouth Every Morning. For BP and edema 90 capsule 1   • [DISCONTINUED] atorvastatin (LIPITOR) 10 MG tablet Take 1 tablet by mouth Daily. For cholesterol 30 tablet 11   • [DISCONTINUED] metoprolol succinate XL (TOPROL-XL) 50 MG 24 hr tablet Take 1 tablet by mouth Daily. For BP and palpitations 90 tablet 1   • [DISCONTINUED] triamterene-hydrochlorothiazide (DYAZIDE) 37.5-25 MG per capsule TAKE ONE CAPSULE BY MOUTH EVERY MORNING FOR BLOOD PRESSURE AND EDEMA 30 capsule 0     No  facility-administered encounter medications on file as of 9/7/2017.        Reviewed use of high risk medication in the elderly: yes  Reviewed for potential of harmful drug interactions in the elderly: yes    Follow Up:  Return in about 6 months (around 3/7/2018), or if symptoms worsen or fail to improve.     An After Visit Summary and PPPS with all of these plans were given to the patient.

## 2017-09-07 NOTE — PATIENT INSTRUCTIONS
Low glycemic index diet  Exercise 30 minutes most days of the week  Make sure you get results on any labs or tests we ordered today  We discussed medications and how to take them as prescribed  Sleep 6-8 hours each night if possible  If you have not signed up for [employer], please activate your code ASAP from your After Visit Summary today    LDL goal <100  LDL goal if heart disease <70  HDL goal >60  Triglyceride goal <150  BP goal =<130/80  Fasting glucose <100        Exercising to Lose Weight  Exercising can help you to lose weight. In order to lose weight through exercise, you need to do vigorous-intensity exercise. You can tell that you are exercising with vigorous intensity if you are breathing very hard and fast and cannot hold a conversation while exercising.  Moderate-intensity exercise helps to maintain your current weight. You can tell that you are exercising at a moderate level if you have a higher heart rate and faster breathing, but you are still able to hold a conversation.  HOW OFTEN SHOULD I EXERCISE?  Choose an activity that you enjoy and set realistic goals. Your health care provider can help you to make an activity plan that works for you. Exercise regularly as directed by your health care provider. This may include:  · Doing resistance training twice each week, such as:    Push-ups.    Sit-ups.    Lifting weights.    Using resistance bands.  · Doing a given intensity of exercise for a given amount of time. Choose from these options:    150 minutes of moderate-intensity exercise every week.    75 minutes of vigorous-intensity exercise every week.    A mix of moderate-intensity and vigorous-intensity exercise every week.  Children, pregnant women, people who are out of shape, people who are overweight, and older adults may need to consult a health care provider for individual recommendations. If you have any sort of medical condition, be sure to consult your health care provider before starting a  new exercise program.  WHAT ARE SOME ACTIVITIES THAT CAN HELP ME TO LOSE WEIGHT?   · Walking at a rate of at least 4.5 miles an hour.  · Jogging or running at a rate of 5 miles per hour.  · Biking at a rate of at least 10 miles per hour.  · Lap swimming.  · Roller-skating or in-line skating.  · Cross-country skiing.  · Vigorous competitive sports, such as football, basketball, and soccer.  · Jumping rope.  · Aerobic dancing.  HOW CAN I BE MORE ACTIVE IN MY DAY-TO-DAY ACTIVITIES?  · Use the stairs instead of the elevator.  · Take a walk during your lunch break.  · If you drive, park your car farther away from work or school.  · If you take public transportation, get off one stop early and walk the rest of the way.  · Make all of your phone calls while standing up and walking around.  · Get up, stretch, and walk around every 30 minutes throughout the day.  WHAT GUIDELINES SHOULD I FOLLOW WHILE EXERCISING?  · Do not exercise so much that you hurt yourself, feel dizzy, or get very short of breath.  · Consult your health care provider prior to starting a new exercise program.  · Wear comfortable clothes and shoes with good support.  · Drink plenty of water while you exercise to prevent dehydration or heat stroke. Body water is lost during exercise and must be replaced.  · Work out until you breathe faster and your heart beats faster.     This information is not intended to replace advice given to you by your health care provider. Make sure you discuss any questions you have with your health care provider.     Document Released: 01/20/2012 Document Revised: 01/08/2016 Document Reviewed: 05/21/2015  [employer] Patient Education ©2017 [employer]

## 2017-09-07 NOTE — PROGRESS NOTES
Subjective   Onelia Alegre is a 66 y.o. female.     History of Present Illness   Onelia Alegre 66 y.o. female who presents today for routine follow up check and medication refills.  she has a history of   Patient Active Problem List   Diagnosis   • Benign essential hypertension   • Osteopenia   • Excess weight   • Localized edema   • Thyroid function test abnormal   • Primary hypothyroidism   • Vitamin D deficiency   • Dyslipidemia   .  Since the last visit, she has overall felt well.  She has Hypertenision and is well controlled on medication, Hyperlipidemia and is well controlled on medication and thyroid is managed by DR Farooq.  she has been compliant with current medications have reviewed them.  The patient denies medication side effects.    Vit D was 59  I am monitoring renal functions-she is avoiding NSAIDs;;  Est CC 69  I see she is now on Allopurinol;  She also take Dyazide;  On Toprol for palpitations   No results found for: CHOL  Lab Results   Component Value Date    TRIG 125 08/30/2017    TRIG 79 05/15/2017    TRIG 106 03/08/2017     Lab Results   Component Value Date    HDL 58 08/30/2017    HDL 56 05/15/2017    HDL 61 (H) 03/08/2017     No results found for: LDLCALC  Lab Results   Component Value Date    LDL 55 08/30/2017    LDL 51 05/15/2017     (H) 03/08/2017     No results found for: HDLLDLRATIO  No components found for: CHOLHDL      The following portions of the patient's history were reviewed and updated as appropriate: allergies, current medications, past family history, past medical history, past social history, past surgical history and problem list.    Review of Systems   Constitutional: Negative for activity change, appetite change and unexpected weight change.   HENT: Negative for nosebleeds and trouble swallowing.    Eyes: Negative for pain and visual disturbance.   Respiratory: Negative for chest tightness, shortness of breath and wheezing.    Cardiovascular: Negative for  chest pain and palpitations.   Gastrointestinal: Negative for abdominal pain and blood in stool.   Endocrine: Negative.    Genitourinary: Negative for difficulty urinating and hematuria.   Musculoskeletal: Negative for joint swelling.   Skin: Negative for color change and rash.   Allergic/Immunologic: Negative.    Neurological: Negative for syncope and speech difficulty.   Hematological: Negative for adenopathy.   Psychiatric/Behavioral: Negative for agitation and confusion.   All other systems reviewed and are negative.      Objective   Physical Exam   Constitutional: She is oriented to person, place, and time. She appears well-developed and well-nourished. No distress.   HENT:   Head: Normocephalic and atraumatic.   Eyes: Conjunctivae and EOM are normal. Pupils are equal, round, and reactive to light. Right eye exhibits no discharge. Left eye exhibits no discharge. No scleral icterus.   Neck: Normal range of motion. Neck supple. No tracheal deviation present. No thyromegaly present.   Cardiovascular: Normal rate, regular rhythm, normal heart sounds, intact distal pulses and normal pulses.  Exam reveals no gallop.    No murmur heard.  Trace pedal edema = bilat     Pulmonary/Chest: Effort normal and breath sounds normal. No respiratory distress. She has no wheezes. She has no rales.   Musculoskeletal: Normal range of motion.   Neurological: She is alert and oriented to person, place, and time. She exhibits normal muscle tone. Coordination normal.   Skin: Skin is warm. No rash noted. No erythema. No pallor.   Psychiatric: She has a normal mood and affect. Her behavior is normal. Judgment and thought content normal.   Nursing note and vitals reviewed.      Assessment/Plan   Problems Addressed this Visit        Cardiovascular and Mediastinum    Benign essential hypertension    Relevant Medications    triamterene-hydrochlorothiazide (DYAZIDE) 37.5-25 MG per capsule    metoprolol succinate XL (TOPROL-XL) 50 MG 24 hr  tablet    Other Relevant Orders    Flu Vaccine Quad PF 3YR+       Other    Dyslipidemia    Relevant Orders    Flu Vaccine Quad PF 3YR+      Other Visit Diagnoses     Encounter for screening mammogram for breast cancer    -  Primary    Relevant Orders    Mammo Screening Bilateral With CAD    Flu Vaccine Quad PF 3YR+    Encounter for immunization         Relevant Orders    Flu Vaccine Quad PF 3YR+

## 2017-09-12 ENCOUNTER — OFFICE VISIT (OUTPATIENT)
Dept: ENDOCRINOLOGY | Age: 67
End: 2017-09-12

## 2017-09-12 VITALS
WEIGHT: 186.4 LBS | RESPIRATION RATE: 16 BRPM | DIASTOLIC BLOOD PRESSURE: 78 MMHG | SYSTOLIC BLOOD PRESSURE: 126 MMHG | BODY MASS INDEX: 30.55 KG/M2

## 2017-09-12 DIAGNOSIS — E78.5 DYSLIPIDEMIA: ICD-10-CM

## 2017-09-12 DIAGNOSIS — E55.9 VITAMIN D DEFICIENCY: ICD-10-CM

## 2017-09-12 DIAGNOSIS — E79.0 HYPERURICEMIA: ICD-10-CM

## 2017-09-12 DIAGNOSIS — E03.9 PRIMARY HYPOTHYROIDISM: Primary | ICD-10-CM

## 2017-09-12 DIAGNOSIS — I10 BENIGN ESSENTIAL HYPERTENSION: ICD-10-CM

## 2017-09-12 DIAGNOSIS — N95.1 SYMPTOMATIC MENOPAUSAL OR FEMALE CLIMACTERIC STATES: ICD-10-CM

## 2017-09-12 DIAGNOSIS — E66.3 EXCESS WEIGHT: ICD-10-CM

## 2017-09-12 PROCEDURE — 99214 OFFICE O/P EST MOD 30 MIN: CPT | Performed by: INTERNAL MEDICINE

## 2017-09-12 RX ORDER — LEVOTHYROXINE SODIUM 125 MCG
125 TABLET ORAL DAILY
Qty: 30 TABLET | Refills: 5 | Status: SHIPPED | OUTPATIENT
Start: 2017-09-12 | End: 2018-04-03 | Stop reason: SDUPTHER

## 2017-09-12 RX ORDER — ESTRADIOL 1 MG/1
1 TABLET ORAL DAILY
Qty: 30 TABLET | Refills: 5 | Status: SHIPPED | OUTPATIENT
Start: 2017-09-12 | End: 2018-04-03 | Stop reason: SDUPTHER

## 2017-09-12 RX ORDER — ATORVASTATIN CALCIUM 10 MG/1
10 TABLET, FILM COATED ORAL DAILY
Qty: 90 TABLET | Refills: 3 | Status: SHIPPED | OUTPATIENT
Start: 2017-09-12 | End: 2018-04-03 | Stop reason: SDUPTHER

## 2017-09-12 RX ORDER — METOPROLOL SUCCINATE 50 MG/1
50 TABLET, EXTENDED RELEASE ORAL DAILY
Qty: 90 TABLET | Refills: 3 | Status: SHIPPED | OUTPATIENT
Start: 2017-09-12 | End: 2018-09-27

## 2017-09-12 RX ORDER — ALLOPURINOL 100 MG/1
100 TABLET ORAL DAILY
Qty: 30 TABLET | Refills: 5 | Status: SHIPPED | OUTPATIENT
Start: 2017-09-12 | End: 2018-04-03 | Stop reason: SDUPTHER

## 2017-09-12 NOTE — PROGRESS NOTES
Subjective   Onelia Alegre is a 66 y.o. female seen for follow up for osteopenia, dyslipidemia, vit d deficiency, lab review. Patient denies any problems or concerns.     History of Present Illness this is a 66-year-old female known patient with hypothyroidism and dyslipidemia as well as menopausal symptoms and postmenopausal osteopenia and hyperuricemia.  Over the course of last 6 months she has had no significant health problems for which to go to emergency room or hospital.  /78  Resp 16  Wt 186 lb 6.4 oz (84.6 kg)  BMI 30.55 kg/m2  Allergies   Allergen Reactions   • Actonel  [Risedronate Sodium]      Other reaction(s): ARTHRALGIAS   • Penicillins      Other reaction(s): JOINT PAIN   • Sulfa Antibiotics Rash       Current Outpatient Prescriptions:   •  allopurinol (ZYLOPRIM) 100 MG tablet, Take 1 tablet by mouth Daily., Disp: 30 tablet, Rfl: 5  •  aspirin 81 MG EC tablet, Take 81 mg by mouth daily., Disp: , Rfl:   •  atorvastatin (LIPITOR) 10 MG tablet, Take 1 tablet by mouth Daily. For cholesterol, Disp: 90 tablet, Rfl: 3  •  Calcium Carbonate-Vitamin D (CALCIUM-VITAMIN D) 600-200 MG-UNIT capsule, Take by mouth., Disp: , Rfl:   •  cetirizine (ZyrTEC) 10 MG tablet, Take 10 mg by mouth daily. Take one tablet daily as needed, Disp: , Rfl:   •  cholecalciferol (VITAMIN D3) 1000 UNITS tablet, Take 2,000 Units by mouth Daily., Disp: , Rfl:   •  estradiol (ESTRACE) 1 MG tablet, Take 1 tablet by mouth Daily., Disp: 30 tablet, Rfl: 5  •  melatonin tablet, Take by mouth., Disp: , Rfl:   •  metoprolol succinate XL (TOPROL-XL) 50 MG 24 hr tablet, Take 1 tablet by mouth Daily. For BP and palpitations, Disp: 90 tablet, Rfl: 1  •  Multiple Vitamin (MULTIVITAMIN) tablet, Take 1 tablet by mouth daily., Disp: , Rfl:   •  progesterone (PROMETRIUM) 200 MG capsule, Take 1 capsule by mouth Daily., Disp: 30 capsule, Rfl: 5  •  SYNTHROID 125 MCG tablet, Take 1 tablet by mouth Daily. On empty stomach, Disp: 30 tablet, Rfl:  5  •  triamterene-hydrochlorothiazide (DYAZIDE) 37.5-25 MG per capsule, Take 1 capsule by mouth Every Morning. For BP and edema, Disp: 90 capsule, Rfl: 1          The following portions of the patient's history were reviewed and updated as appropriate: allergies, current medications, past family history, past medical history, past social history, past surgical history and problem list.    Review of Systems   Constitutional: Negative.    HENT: Negative.    Eyes: Negative.    Respiratory: Negative.    Cardiovascular: Negative.    Gastrointestinal: Negative.    Endocrine: Negative.    Genitourinary: Negative.    Musculoskeletal: Negative.    Skin: Negative.    Allergic/Immunologic: Negative.    Neurological: Negative.    Hematological: Negative.    Psychiatric/Behavioral: Negative.        Objective   Physical Exam   Constitutional: She is oriented to person, place, and time. She appears well-developed and well-nourished. No distress.   HENT:   Head: Normocephalic and atraumatic.   Right Ear: External ear normal.   Left Ear: External ear normal.   Nose: Nose normal.   Mouth/Throat: Oropharynx is clear and moist. No oropharyngeal exudate.   Eyes: Conjunctivae and EOM are normal. Pupils are equal, round, and reactive to light. Right eye exhibits no discharge. Left eye exhibits no discharge. No scleral icterus.   Neck: Normal range of motion. Neck supple. No JVD present. No tracheal deviation present. Thyromegaly present.   Very find the nodular goiter.  It most freely with swallowing and is not associated with tenderness or lymphadenopathy.   Cardiovascular: Normal rate, regular rhythm, normal heart sounds and intact distal pulses.  Exam reveals no gallop and no friction rub.    No murmur heard.  Pulmonary/Chest: Effort normal and breath sounds normal. No stridor. No respiratory distress. She has no wheezes. She has no rales. She exhibits no tenderness.   Abdominal: Soft. Bowel sounds are normal. She exhibits no  distension and no mass. There is no tenderness. There is no rebound and no guarding. No hernia.   Musculoskeletal: Normal range of motion. She exhibits no edema, tenderness or deformity.   Lymphadenopathy:     She has no cervical adenopathy.   Neurological: She is alert and oriented to person, place, and time. She has normal reflexes. She displays normal reflexes. No cranial nerve deficit. She exhibits normal muscle tone. Coordination normal.   Skin: Skin is warm and dry. No rash noted. She is not diaphoretic. No erythema. No pallor.   Psychiatric: She has a normal mood and affect. Her behavior is normal. Judgment and thought content normal.   Nursing note and vitals reviewed.     Results for orders placed or performed in visit on 08/30/17   Comprehensive Metabolic Panel   Result Value Ref Range    Glucose 98 65 - 99 mg/dL    BUN 17 8 - 23 mg/dL    Creatinine 1.02 (H) 0.57 - 1.00 mg/dL    eGFR Non African Am 54 (L) >60 mL/min/1.73    eGFR African Am 65 >60 mL/min/1.73    BUN/Creatinine Ratio 16.7 7.0 - 25.0    Sodium 141 136 - 145 mmol/L    Potassium 4.4 3.5 - 5.2 mmol/L    Chloride 99 98 - 107 mmol/L    Total CO2 25.7 22.0 - 29.0 mmol/L    Calcium 9.9 8.6 - 10.5 mg/dL    Total Protein 7.2 6.0 - 8.5 g/dL    Albumin 4.60 3.50 - 5.20 g/dL    Globulin 2.6 gm/dL    A/G Ratio 1.8 g/dL    Total Bilirubin 1.3 (H) 0.1 - 1.2 mg/dL    Alkaline Phosphatase 100 39 - 117 U/L    AST (SGOT) 18 1 - 32 U/L    ALT (SGPT) 17 1 - 33 U/L   Lipid Panel   Result Value Ref Range    Total Cholesterol 138 0 - 200 mg/dL    Triglycerides 125 0 - 150 mg/dL    HDL Cholesterol 58 40 - 60 mg/dL    VLDL Cholesterol 25 5 - 40 mg/dL    LDL Cholesterol  55 0 - 100 mg/dL   Vitamin D 25 Hydroxy   Result Value Ref Range    25 Hydroxy, Vitamin D 72.5 30.0 - 100.0 ng/mL   T3, Free   Result Value Ref Range    T3, Free 3.4 2.0 - 4.4 pg/mL   T4, Free   Result Value Ref Range    Free T4 2.13 (H) 0.93 - 1.70 ng/dL   Thyroid Panel With TSH   Result Value Ref  Range    TSH 0.987 0.450 - 4.500 uIU/mL    T4, Total 10.1 4.5 - 12.0 ug/dL    T3 Uptake 33 24 - 39 %    Free Thyroxine Index 3.3 1.2 - 4.9   Comprehensive Thyroglobulin   Result Value Ref Range    Thyroglobulin Ab <1.0 IU/mL    Thyroglobulin 15.8 ng/mL    Thyroglobulin (TG-ANGLE) Comment ng/mL           Assessment/Plan   Diagnoses and all orders for this visit:    Primary hypothyroidism  -     T3, Free; Future  -     T4 & TSH ([facility]); Future  -     T4, Free; Future  -     Uric Acid; Future  -     Vitamin D 25 Hydroxy; Future  -     Comprehensive Metabolic Panel; Future  -     Lipid Panel; Future  -     Thyroglobulin With Anti-TG; Future    Benign essential hypertension  -     T3, Free; Future  -     T4 & TSH ([facility]); Future  -     T4, Free; Future  -     Uric Acid; Future  -     Vitamin D 25 Hydroxy; Future  -     Comprehensive Metabolic Panel; Future  -     Lipid Panel; Future  -     Thyroglobulin With Anti-TG; Future    Vitamin D deficiency  -     T3, Free; Future  -     T4 & TSH ([facility]); Future  -     T4, Free; Future  -     Uric Acid; Future  -     Vitamin D 25 Hydroxy; Future  -     Comprehensive Metabolic Panel; Future  -     Lipid Panel; Future  -     Thyroglobulin With Anti-TG; Future    Dyslipidemia  -     T3, Free; Future  -     T4 & TSH ([facility]); Future  -     T4, Free; Future  -     Uric Acid; Future  -     Vitamin D 25 Hydroxy; Future  -     Comprehensive Metabolic Panel; Future  -     Lipid Panel; Future  -     Thyroglobulin With Anti-TG; Future    Excess weight  -     T3, Free; Future  -     T4 & TSH ([facility]); Future  -     T4, Free; Future  -     Uric Acid; Future  -     Vitamin D 25 Hydroxy; Future  -     Comprehensive Metabolic Panel; Future  -     Lipid Panel; Future  -     Thyroglobulin With Anti-TG; Future    Symptomatic menopausal or female climacteric states  -     T3, Free; Future  -     T4 & TSH ([facility]); Future  -     T4, Free; Future  -     Uric Acid; Future  -     Vitamin D 25  Hydroxy; Future  -     Comprehensive Metabolic Panel; Future  -     Lipid Panel; Future  -     Thyroglobulin With Anti-TG; Future    Hyperuricemia  -     T3, Free; Future  -     T4 & TSH ([facility]); Future  -     T4, Free; Future  -     Uric Acid; Future  -     Vitamin D 25 Hydroxy; Future  -     Comprehensive Metabolic Panel; Future  -     Lipid Panel; Future  -     Thyroglobulin With Anti-TG; Future    Other orders  -     SYNTHROID 125 MCG tablet; Take 1 tablet by mouth Daily. On empty stomach  -     progesterone (PROMETRIUM) 200 MG capsule; Take 1 capsule by mouth Daily.  -     metoprolol succinate XL (TOPROL-XL) 50 MG 24 hr tablet; Take 1 tablet by mouth Daily. For BP and palpitations  -     estradiol (ESTRACE) 1 MG tablet; Take 1 tablet by mouth Daily.  -     atorvastatin (LIPITOR) 10 MG tablet; Take 1 tablet by mouth Daily. For cholesterol  -     allopurinol (ZYLOPRIM) 100 MG tablet; Take 1 tablet by mouth Daily.             in summary I saw and examined this 66-year-old female for above-mentioned problems.  I reviewed her laboratory evaluation of 08/30/2017 and provided her with a hard copy of it.  He is clinically and metabolically stable and therefore I will see her in 6 months or sooner if needed with laboratory evaluation.  She will continue all her current prescriptions.

## 2018-01-03 ENCOUNTER — APPOINTMENT (OUTPATIENT)
Dept: WOMENS IMAGING | Facility: HOSPITAL | Age: 68
End: 2018-01-03

## 2018-01-03 PROCEDURE — 77067 SCR MAMMO BI INCL CAD: CPT | Performed by: RADIOLOGY

## 2018-03-02 ENCOUNTER — RESULTS ENCOUNTER (OUTPATIENT)
Dept: ENDOCRINOLOGY | Age: 68
End: 2018-03-02

## 2018-03-02 DIAGNOSIS — E78.5 DYSLIPIDEMIA: ICD-10-CM

## 2018-03-02 DIAGNOSIS — E66.3 EXCESS WEIGHT: ICD-10-CM

## 2018-03-02 DIAGNOSIS — E03.9 PRIMARY HYPOTHYROIDISM: ICD-10-CM

## 2018-03-02 DIAGNOSIS — N95.1 SYMPTOMATIC MENOPAUSAL OR FEMALE CLIMACTERIC STATES: ICD-10-CM

## 2018-03-02 DIAGNOSIS — I10 BENIGN ESSENTIAL HYPERTENSION: ICD-10-CM

## 2018-03-02 DIAGNOSIS — E55.9 VITAMIN D DEFICIENCY: ICD-10-CM

## 2018-03-02 DIAGNOSIS — E79.0 HYPERURICEMIA: ICD-10-CM

## 2018-03-21 LAB
25(OH)D3+25(OH)D2 SERPL-MCNC: 66.4 NG/ML (ref 30–100)
ALBUMIN SERPL-MCNC: 4.6 G/DL (ref 3.5–5.2)
ALBUMIN/GLOB SERPL: 1.9 G/DL
ALP SERPL-CCNC: 80 U/L (ref 39–117)
ALT SERPL-CCNC: 18 U/L (ref 1–33)
AST SERPL-CCNC: 21 U/L (ref 1–32)
BILIRUB SERPL-MCNC: 0.9 MG/DL (ref 0.1–1.2)
BUN SERPL-MCNC: 16 MG/DL (ref 8–23)
BUN/CREAT SERPL: 16.3 (ref 7–25)
CALCIUM SERPL-MCNC: 10 MG/DL (ref 8.6–10.5)
CHLORIDE SERPL-SCNC: 98 MMOL/L (ref 98–107)
CHOLEST SERPL-MCNC: 136 MG/DL (ref 0–200)
CO2 SERPL-SCNC: 28.4 MMOL/L (ref 22–29)
CREAT SERPL-MCNC: 0.98 MG/DL (ref 0.57–1)
GFR SERPLBLD CREATININE-BSD FMLA CKD-EPI: 56 ML/MIN/1.73
GFR SERPLBLD CREATININE-BSD FMLA CKD-EPI: 68 ML/MIN/1.73
GLOBULIN SER CALC-MCNC: 2.4 GM/DL
GLUCOSE SERPL-MCNC: 95 MG/DL (ref 65–99)
HDLC SERPL-MCNC: 57 MG/DL (ref 40–60)
INTERPRETATION: NORMAL
LDLC SERPL CALC-MCNC: 52 MG/DL (ref 0–100)
POTASSIUM SERPL-SCNC: 4.3 MMOL/L (ref 3.5–5.2)
PROT SERPL-MCNC: 7 G/DL (ref 6–8.5)
SODIUM SERPL-SCNC: 139 MMOL/L (ref 136–145)
T3FREE SERPL-MCNC: 3.2 PG/ML (ref 2–4.4)
T4 FREE SERPL-MCNC: 2.22 NG/DL (ref 0.93–1.7)
T4 SERPL-MCNC: 12.62 MCG/DL (ref 4.5–11.7)
THYROGLOB AB SERPL-ACNC: <1 IU/ML (ref 0–0.9)
THYROGLOB SERPL-MCNC: 12.4 NG/ML (ref 1.5–38.5)
TRIGL SERPL-MCNC: 134 MG/DL (ref 0–150)
TSH SERPL DL<=0.005 MIU/L-ACNC: 0.79 MIU/ML (ref 0.27–4.2)
URATE SERPL-MCNC: 4.1 MG/DL (ref 2.4–5.7)
VLDLC SERPL CALC-MCNC: 26.8 MG/DL (ref 5–40)

## 2018-03-24 RX ORDER — TRIAMTERENE AND HYDROCHLOROTHIAZIDE 37.5; 25 MG/1; MG/1
CAPSULE ORAL
Qty: 90 CAPSULE | Refills: 0 | Status: SHIPPED | OUTPATIENT
Start: 2018-03-24 | End: 2018-03-27 | Stop reason: SDUPTHER

## 2018-03-27 ENCOUNTER — OFFICE VISIT (OUTPATIENT)
Dept: FAMILY MEDICINE CLINIC | Facility: CLINIC | Age: 68
End: 2018-03-27

## 2018-03-27 VITALS
OXYGEN SATURATION: 98 % | TEMPERATURE: 98.2 F | HEART RATE: 72 BPM | RESPIRATION RATE: 16 BRPM | BODY MASS INDEX: 29.25 KG/M2 | SYSTOLIC BLOOD PRESSURE: 120 MMHG | WEIGHT: 182 LBS | DIASTOLIC BLOOD PRESSURE: 80 MMHG | HEIGHT: 66 IN

## 2018-03-27 DIAGNOSIS — I10 BENIGN ESSENTIAL HYPERTENSION: ICD-10-CM

## 2018-03-27 DIAGNOSIS — Z87.39 HISTORY OF GOUT: ICD-10-CM

## 2018-03-27 DIAGNOSIS — N28.9 RENAL INSUFFICIENCY: ICD-10-CM

## 2018-03-27 DIAGNOSIS — R60.0 LOCALIZED EDEMA: Primary | ICD-10-CM

## 2018-03-27 PROCEDURE — 99213 OFFICE O/P EST LOW 20 MIN: CPT | Performed by: PHYSICIAN ASSISTANT

## 2018-03-27 RX ORDER — TRIAMTERENE AND HYDROCHLOROTHIAZIDE 37.5; 25 MG/1; MG/1
1 CAPSULE ORAL EVERY MORNING
Qty: 90 CAPSULE | Refills: 3 | Status: SHIPPED | OUTPATIENT
Start: 2018-03-27 | End: 2018-09-27 | Stop reason: SDUPTHER

## 2018-03-27 NOTE — PATIENT INSTRUCTIONS
Low glycemic index diet  Exercise 30 minutes most days of the week  Make sure you get results on any labs or tests we ordered today  We discussed medications and how to take them as prescribed  Sleep 6-8 hours each night if possible  If you have not signed up for [employer], please activate your code ASAP from your After Visit Summary today    LDL goal <100  LDL goal if heart disease <70  HDL goal >60  Triglyceride goal <150  BP goal =<130/80  Fasting glucose <100

## 2018-03-27 NOTE — PROGRESS NOTES
Subjective   Onelia Alegre is a 67 y.o. female.     History of Present Illness     Onelia Alegre 67 y.o. female who presents today for routine follow up check and medication refills.  she has a history of   Patient Active Problem List   Diagnosis   • Benign essential hypertension   • Osteopenia   • Excess weight   • Localized edema   • Thyroid function test abnormal   • Primary hypothyroidism   • Vitamin D deficiency   • Dyslipidemia   • Hyperuricemia   • Symptomatic menopausal or female climacteric states   • Renal insufficiency   .  Since the last visit, she has overall felt well.  She has Hypertenision and is well controlled on medication, Hyperlipidemia and is well controlled on medication and Hypothyroidism and under the care of Endocrinologist.  she has been compliant with current medications have reviewed them.  The patient denies medication side effects.    Results for orders placed or performed in visit on 03/02/18   T3, Free   Result Value Ref Range    T3, Free 3.2 2.0 - 4.4 pg/mL   T4 & TSH ([facility])   Result Value Ref Range    TSH 0.790 0.270 - 4.200 mIU/mL    T4, Total 12.62 (H) 4.50 - 11.70 mcg/dL   T4, Free   Result Value Ref Range    Free T4 2.22 (H) 0.93 - 1.70 ng/dL   Uric Acid   Result Value Ref Range    Uric Acid 4.1 2.4 - 5.7 mg/dL   Vitamin D 25 Hydroxy   Result Value Ref Range    25 Hydroxy, Vitamin D 66.4 30.0 - 100.0 ng/ml   Comprehensive Metabolic Panel   Result Value Ref Range    Glucose 95 65 - 99 mg/dL    BUN 16 8 - 23 mg/dL    Creatinine 0.98 0.57 - 1.00 mg/dL    eGFR Non African Am 56 (L) >60 mL/min/1.73    eGFR African Am 68 >60 mL/min/1.73    BUN/Creatinine Ratio 16.3 7.0 - 25.0    Sodium 139 136 - 145 mmol/L    Potassium 4.3 3.5 - 5.2 mmol/L    Chloride 98 98 - 107 mmol/L    Total CO2 28.4 22.0 - 29.0 mmol/L    Calcium 10.0 8.6 - 10.5 mg/dL    Total Protein 7.0 6.0 - 8.5 g/dL    Albumin 4.60 3.50 - 5.20 g/dL    Globulin 2.4 gm/dL    A/G Ratio 1.9 g/dL    Total Bilirubin  0.9 0.1 - 1.2 mg/dL    Alkaline Phosphatase 80 39 - 117 U/L    AST (SGOT) 21 1 - 32 U/L    ALT (SGPT) 18 1 - 33 U/L   Lipid Panel   Result Value Ref Range    Total Cholesterol 136 0 - 200 mg/dL    Triglycerides 134 0 - 150 mg/dL    HDL Cholesterol 57 40 - 60 mg/dL    VLDL Cholesterol 26.8 5 - 40 mg/dL    LDL Cholesterol  52 0 - 100 mg/dL   Thyroglobulin With Anti-TG   Result Value Ref Range    Thyroglobulin Ab <1.0 0.0 - 0.9 IU/mL   Thyroglobulin By EMILIANO   Result Value Ref Range    THYROGLOBULIN BY EMILIANO 12.4 1.5 - 38.5 ng/mL   Cardiovascular Risk Assessment   Result Value Ref Range    Interpretation Note      Having more OA pain in knees; worse with stairs;  Avoiding NSAIDs;  Est CC 70  Do suggest shingles vaccine    She is on bp med for HTN and beta bl for palpitations  Dyazide for bp and edema  She is on HRT;  mammo in Jan was neg    The following portions of the patient's history were reviewed and updated as appropriate: allergies, current medications, past family history, past medical history, past social history, past surgical history and problem list.    Review of Systems   Constitutional: Negative for activity change, appetite change and unexpected weight change.   HENT: Negative for nosebleeds and trouble swallowing.    Eyes: Negative for pain and visual disturbance.   Respiratory: Negative for chest tightness, shortness of breath and wheezing.    Cardiovascular: Negative for chest pain and palpitations.   Gastrointestinal: Negative for abdominal pain and blood in stool.   Endocrine: Negative.    Genitourinary: Negative for difficulty urinating and hematuria.   Musculoskeletal: Positive for arthralgias. Negative for joint swelling.   Skin: Negative for color change and rash.   Allergic/Immunologic: Negative.    Neurological: Negative for syncope and speech difficulty.   Hematological: Negative for adenopathy.   Psychiatric/Behavioral: Negative for agitation and confusion.   All other systems reviewed and are  negative.      Objective   Physical Exam   Constitutional: She is oriented to person, place, and time. She appears well-developed and well-nourished. No distress.   HENT:   Head: Normocephalic and atraumatic.   Eyes: Conjunctivae and EOM are normal. Pupils are equal, round, and reactive to light. Right eye exhibits no discharge. Left eye exhibits no discharge. No scleral icterus.   Neck: Normal range of motion. Neck supple. No tracheal deviation present. No thyromegaly present.   Cardiovascular: Normal rate, regular rhythm, normal heart sounds, intact distal pulses and normal pulses.  Exam reveals no gallop.    No murmur heard.  Trace pedal edema = bilat     Pulmonary/Chest: Effort normal and breath sounds normal. No respiratory distress. She has no wheezes. She has no rales.   Musculoskeletal: Normal range of motion.   Neurological: She is alert and oriented to person, place, and time. She exhibits normal muscle tone. Coordination normal.   Skin: Skin is warm. No rash noted. No erythema. No pallor.   Psychiatric: She has a normal mood and affect. Her behavior is normal. Judgment and thought content normal.   Nursing note and vitals reviewed.      Assessment/Plan   Onelia was seen today for hypertension.    Diagnoses and all orders for this visit:    Localized edema    Benign essential hypertension    Renal insufficiency    History of gout    Other orders  -     triamterene-hydrochlorothiazide (DYAZIDE) 37.5-25 MG per capsule; Take 1 capsule by mouth Every Morning. For BP and edema

## 2018-04-03 ENCOUNTER — OFFICE VISIT (OUTPATIENT)
Dept: ENDOCRINOLOGY | Age: 68
End: 2018-04-03

## 2018-04-03 VITALS
RESPIRATION RATE: 16 BRPM | SYSTOLIC BLOOD PRESSURE: 122 MMHG | BODY MASS INDEX: 30.06 KG/M2 | WEIGHT: 183.4 LBS | DIASTOLIC BLOOD PRESSURE: 78 MMHG

## 2018-04-03 DIAGNOSIS — I10 BENIGN ESSENTIAL HYPERTENSION: ICD-10-CM

## 2018-04-03 DIAGNOSIS — N95.1 SYMPTOMATIC MENOPAUSAL OR FEMALE CLIMACTERIC STATES: ICD-10-CM

## 2018-04-03 DIAGNOSIS — M85.80 OSTEOPENIA, UNSPECIFIED LOCATION: ICD-10-CM

## 2018-04-03 DIAGNOSIS — E55.9 VITAMIN D DEFICIENCY: ICD-10-CM

## 2018-04-03 DIAGNOSIS — E79.0 HYPERURICEMIA: ICD-10-CM

## 2018-04-03 DIAGNOSIS — E03.9 PRIMARY HYPOTHYROIDISM: Primary | ICD-10-CM

## 2018-04-03 PROCEDURE — 99214 OFFICE O/P EST MOD 30 MIN: CPT | Performed by: INTERNAL MEDICINE

## 2018-04-03 RX ORDER — ALLOPURINOL 100 MG/1
100 TABLET ORAL DAILY
Qty: 30 TABLET | Refills: 5 | Status: SHIPPED | OUTPATIENT
Start: 2018-04-03 | End: 2018-09-28 | Stop reason: SDUPTHER

## 2018-04-03 RX ORDER — ATORVASTATIN CALCIUM 10 MG/1
10 TABLET, FILM COATED ORAL DAILY
Qty: 90 TABLET | Refills: 3 | Status: SHIPPED | OUTPATIENT
Start: 2018-04-03 | End: 2018-11-07 | Stop reason: SDUPTHER

## 2018-04-03 RX ORDER — LEVOTHYROXINE SODIUM 125 MCG
125 TABLET ORAL DAILY
Qty: 30 TABLET | Refills: 5 | Status: SHIPPED | OUTPATIENT
Start: 2018-04-03 | End: 2018-09-28 | Stop reason: SDUPTHER

## 2018-04-03 RX ORDER — ESTRADIOL 1 MG/1
1 TABLET ORAL DAILY
Qty: 30 TABLET | Refills: 5 | Status: SHIPPED | OUTPATIENT
Start: 2018-04-03 | End: 2018-11-07 | Stop reason: SDUPTHER

## 2018-04-03 NOTE — PROGRESS NOTES
Subjective   Onelia Alegre is a 67 y.o. female seen for follow up for dyslipidemia, hypothyroidism, vit d deficiency, lab review. Patient denies any problems or concerns.     History of Present Illness this is a 67-year-old female known patient with hypothyroidism and dyslipidemia as well as vitamin D deficiency and hyperuricemia and menopausal symptoms and osteopenia.  Over the course of last 6 months she has had no significant health problems for which to go to the emergency room or hospital.   /78   Resp 16   Wt 83.2 kg (183 lb 6.4 oz)   BMI 30.06 kg/m²   Allergies   Allergen Reactions   • Actonel  [Risedronate Sodium]      Other reaction(s): ARTHRALGIAS   • Penicillins      Other reaction(s): JOINT PAIN   • Sulfa Antibiotics Rash       Current Outpatient Prescriptions:   •  allopurinol (ZYLOPRIM) 100 MG tablet, Take 1 tablet by mouth Daily., Disp: 30 tablet, Rfl: 5  •  aspirin 81 MG EC tablet, Take 81 mg by mouth daily., Disp: , Rfl:   •  atorvastatin (LIPITOR) 10 MG tablet, Take 1 tablet by mouth Daily. For cholesterol, Disp: 90 tablet, Rfl: 3  •  Calcium Carbonate-Vitamin D (CALCIUM-VITAMIN D) 600-200 MG-UNIT capsule, Take by mouth., Disp: , Rfl:   •  cetirizine (ZyrTEC) 10 MG tablet, Take 10 mg by mouth daily. Take one tablet daily as needed, Disp: , Rfl:   •  cholecalciferol (VITAMIN D3) 1000 UNITS tablet, Take 2,000 Units by mouth Daily., Disp: , Rfl:   •  estradiol (ESTRACE) 1 MG tablet, Take 1 tablet by mouth Daily., Disp: 30 tablet, Rfl: 5  •  melatonin tablet, Take by mouth., Disp: , Rfl:   •  metoprolol succinate XL (TOPROL-XL) 50 MG 24 hr tablet, Take 1 tablet by mouth Daily. For BP and palpitations, Disp: 90 tablet, Rfl: 3  •  Multiple Vitamin (MULTIVITAMIN) tablet, Take 1 tablet by mouth daily., Disp: , Rfl:   •  progesterone (PROMETRIUM) 200 MG capsule, Take 1 capsule by mouth Daily., Disp: 30 capsule, Rfl: 5  •  SYNTHROID 125 MCG tablet, Take 1 tablet by mouth Daily. On empty  stomach, Disp: 30 tablet, Rfl: 5  •  triamterene-hydrochlorothiazide (DYAZIDE) 37.5-25 MG per capsule, Take 1 capsule by mouth Every Morning. For BP and edema, Disp: 90 capsule, Rfl: 3      The following portions of the patient's history were reviewed and updated as appropriate: allergies, current medications, past family history, past medical history, past social history, past surgical history and problem list.    Review of Systems   Constitutional: Negative.    HENT: Negative.    Eyes: Negative.    Respiratory: Negative.    Cardiovascular: Negative.    Gastrointestinal: Negative.    Endocrine: Negative.    Genitourinary: Negative.    Musculoskeletal: Negative.    Skin: Negative.    Allergic/Immunologic: Negative.    Neurological: Negative.    Hematological: Negative.    Psychiatric/Behavioral: Negative.        Objective   Physical Exam   Constitutional: She is oriented to person, place, and time. She appears well-developed and well-nourished. No distress.   HENT:   Head: Normocephalic and atraumatic.   Right Ear: External ear normal.   Left Ear: External ear normal.   Nose: Nose normal.   Mouth/Throat: Oropharynx is clear and moist. No oropharyngeal exudate.   Eyes: Conjunctivae and EOM are normal. Pupils are equal, round, and reactive to light. Right eye exhibits no discharge. Left eye exhibits no discharge. No scleral icterus.   Neck: Normal range of motion. Neck supple. No JVD present. No tracheal deviation present. Thyromegaly present.   Very find the nodular goiter.  It most freely with swallowing and is not associated with tenderness or lymphadenopathy.   Cardiovascular: Normal rate, regular rhythm, normal heart sounds and intact distal pulses.  Exam reveals no gallop and no friction rub.    No murmur heard.  Pulmonary/Chest: Effort normal and breath sounds normal. No stridor. No respiratory distress. She has no wheezes. She has no rales. She exhibits no tenderness.   Abdominal: Soft. Bowel sounds are  normal. She exhibits no distension and no mass. There is no tenderness. There is no rebound and no guarding. No hernia.   Musculoskeletal: Normal range of motion. She exhibits no edema, tenderness or deformity.   Lymphadenopathy:     She has no cervical adenopathy.   Neurological: She is alert and oriented to person, place, and time. She has normal reflexes. She displays normal reflexes. No cranial nerve deficit. She exhibits normal muscle tone. Coordination normal.   Skin: Skin is warm and dry. No rash noted. She is not diaphoretic. No erythema. No pallor.   Psychiatric: She has a normal mood and affect. Her behavior is normal. Judgment and thought content normal.   Nursing note and vitals reviewed.    Results for orders placed or performed in visit on 03/02/18   T3, Free   Result Value Ref Range    T3, Free 3.2 2.0 - 4.4 pg/mL   T4 & TSH ([facility])   Result Value Ref Range    TSH 0.790 0.270 - 4.200 mIU/mL    T4, Total 12.62 (H) 4.50 - 11.70 mcg/dL   T4, Free   Result Value Ref Range    Free T4 2.22 (H) 0.93 - 1.70 ng/dL   Uric Acid   Result Value Ref Range    Uric Acid 4.1 2.4 - 5.7 mg/dL   Vitamin D 25 Hydroxy   Result Value Ref Range    25 Hydroxy, Vitamin D 66.4 30.0 - 100.0 ng/ml   Comprehensive Metabolic Panel   Result Value Ref Range    Glucose 95 65 - 99 mg/dL    BUN 16 8 - 23 mg/dL    Creatinine 0.98 0.57 - 1.00 mg/dL    eGFR Non African Am 56 (L) >60 mL/min/1.73    eGFR African Am 68 >60 mL/min/1.73    BUN/Creatinine Ratio 16.3 7.0 - 25.0    Sodium 139 136 - 145 mmol/L    Potassium 4.3 3.5 - 5.2 mmol/L    Chloride 98 98 - 107 mmol/L    Total CO2 28.4 22.0 - 29.0 mmol/L    Calcium 10.0 8.6 - 10.5 mg/dL    Total Protein 7.0 6.0 - 8.5 g/dL    Albumin 4.60 3.50 - 5.20 g/dL    Globulin 2.4 gm/dL    A/G Ratio 1.9 g/dL    Total Bilirubin 0.9 0.1 - 1.2 mg/dL    Alkaline Phosphatase 80 39 - 117 U/L    AST (SGOT) 21 1 - 32 U/L    ALT (SGPT) 18 1 - 33 U/L   Lipid Panel   Result Value Ref Range    Total Cholesterol  136 0 - 200 mg/dL    Triglycerides 134 0 - 150 mg/dL    HDL Cholesterol 57 40 - 60 mg/dL    VLDL Cholesterol 26.8 5 - 40 mg/dL    LDL Cholesterol  52 0 - 100 mg/dL   Thyroglobulin With Anti-TG   Result Value Ref Range    Thyroglobulin Ab <1.0 0.0 - 0.9 IU/mL   Thyroglobulin By EMILIANO   Result Value Ref Range    THYROGLOBULIN BY EMILIANO 12.4 1.5 - 38.5 ng/mL   Cardiovascular Risk Assessment   Result Value Ref Range    Interpretation Note          Assessment/Plan   Diagnoses and all orders for this visit:    Primary hypothyroidism    Vitamin D deficiency  -     T3, Free; Future  -     T4 & TSH ([facility]); Future  -     T4, Free; Future  -     Uric Acid; Future  -     Vitamin D 25 Hydroxy; Future  -     Comprehensive Metabolic Panel; Future  -     C-Peptide; Future  -     Hemoglobin A1c; Future  -     Lipid Panel; Future  -     Thyroglobulin With Anti-TG; Future    Benign essential hypertension  -     T3, Free; Future  -     T4 & TSH ([facility]); Future  -     T4, Free; Future  -     Uric Acid; Future  -     Vitamin D 25 Hydroxy; Future  -     Comprehensive Metabolic Panel; Future  -     C-Peptide; Future  -     Hemoglobin A1c; Future  -     Lipid Panel; Future  -     Thyroglobulin With Anti-TG; Future    Osteopenia, unspecified location  -     T3, Free; Future  -     T4 & TSH ([facility]); Future  -     T4, Free; Future  -     Uric Acid; Future  -     Vitamin D 25 Hydroxy; Future  -     Comprehensive Metabolic Panel; Future  -     C-Peptide; Future  -     Hemoglobin A1c; Future  -     Lipid Panel; Future  -     Thyroglobulin With Anti-TG; Future    Symptomatic menopausal or female climacteric states  -     T3, Free; Future  -     T4 & TSH ([facility]); Future  -     T4, Free; Future  -     Uric Acid; Future  -     Vitamin D 25 Hydroxy; Future  -     Comprehensive Metabolic Panel; Future  -     C-Peptide; Future  -     Hemoglobin A1c; Future  -     Lipid Panel; Future  -     Thyroglobulin With Anti-TG; Future    Hyperuricemia  -      T3, Free; Future  -     T4 & TSH ([facility]); Future  -     T4, Free; Future  -     Uric Acid; Future  -     Vitamin D 25 Hydroxy; Future  -     Comprehensive Metabolic Panel; Future  -     C-Peptide; Future  -     Hemoglobin A1c; Future  -     Lipid Panel; Future  -     Thyroglobulin With Anti-TG; Future    Other orders  -     SYNTHROID 125 MCG tablet; Take 1 tablet by mouth Daily. On empty stomach  -     estradiol (ESTRACE) 1 MG tablet; Take 1 tablet by mouth Daily.  -     progesterone (PROMETRIUM) 200 MG capsule; Take 1 capsule by mouth Daily.  -     atorvastatin (LIPITOR) 10 MG tablet; Take 1 tablet by mouth Daily. For cholesterol  -     allopurinol (ZYLOPRIM) 100 MG tablet; Take 1 tablet by mouth Daily.               In summary I saw and examined this 67-year-old female for above-mentioned problems.  I reviewed her laboratory evaluation of 03/20/2000 0112 and provided her with a hard copy of it.  Overall she is clinically and metabolically stable and therefore we will go ahead and continue all her current prescriptions.  I will see her in 6 months or sooner if needed with laboratory evaluation prior to each office visit.

## 2018-04-27 ENCOUNTER — AMBULATORY SURGICAL CENTER (AMBULATORY)
Dept: URBAN - METROPOLITAN AREA SURGERY 17 | Facility: SURGERY | Age: 68
End: 2018-04-27
Payer: COMMERCIAL

## 2018-04-27 VITALS
SYSTOLIC BLOOD PRESSURE: 92 MMHG | RESPIRATION RATE: 21 BRPM | RESPIRATION RATE: 20 BRPM | DIASTOLIC BLOOD PRESSURE: 62 MMHG | RESPIRATION RATE: 18 BRPM | OXYGEN SATURATION: 100 % | OXYGEN SATURATION: 93 % | TEMPERATURE: 97.6 F | WEIGHT: 180 LBS | SYSTOLIC BLOOD PRESSURE: 105 MMHG | DIASTOLIC BLOOD PRESSURE: 49 MMHG | DIASTOLIC BLOOD PRESSURE: 66 MMHG | OXYGEN SATURATION: 99 % | TEMPERATURE: 97.8 F | SYSTOLIC BLOOD PRESSURE: 145 MMHG | HEIGHT: 65 IN | RESPIRATION RATE: 19 BRPM | RESPIRATION RATE: 22 BRPM | HEART RATE: 70 BPM | DIASTOLIC BLOOD PRESSURE: 44 MMHG | DIASTOLIC BLOOD PRESSURE: 58 MMHG | HEART RATE: 74 BPM | SYSTOLIC BLOOD PRESSURE: 93 MMHG | HEART RATE: 63 BPM | RESPIRATION RATE: 14 BRPM | OXYGEN SATURATION: 92 % | HEART RATE: 66 BPM | HEART RATE: 68 BPM | SYSTOLIC BLOOD PRESSURE: 115 MMHG | OXYGEN SATURATION: 95 % | HEART RATE: 65 BPM | SYSTOLIC BLOOD PRESSURE: 144 MMHG | HEART RATE: 73 BPM | HEART RATE: 71 BPM | DIASTOLIC BLOOD PRESSURE: 87 MMHG | OXYGEN SATURATION: 97 % | DIASTOLIC BLOOD PRESSURE: 78 MMHG

## 2018-04-27 DIAGNOSIS — Z86.010 PERSONAL HISTORY OF COLONIC POLYPS: ICD-10-CM

## 2018-04-27 DIAGNOSIS — K57.30 DIVERTICULOSIS OF LARGE INTESTINE WITHOUT PERFORATION OR ABS: ICD-10-CM

## 2018-04-27 DIAGNOSIS — K64.0 FIRST DEGREE HEMORRHOIDS: ICD-10-CM

## 2018-04-27 PROCEDURE — 45378 DIAGNOSTIC COLONOSCOPY: CPT | Mod: 33 | Performed by: INTERNAL MEDICINE

## 2018-04-27 RX ADMIN — LIDOCAINE HYDROCHLORIDE 30 MG: 10 INJECTION, SOLUTION EPIDURAL; INFILTRATION; INTRACAUDAL; PERINEURAL at 08:00

## 2018-04-27 RX ADMIN — PROPOFOL 100 MG: 10 INJECTION, EMULSION INTRAVENOUS at 08:00

## 2018-04-27 RX ADMIN — PROPOFOL 50 MG: 10 INJECTION, EMULSION INTRAVENOUS at 08:04

## 2018-04-27 RX ADMIN — PROPOFOL 50 MG: 10 INJECTION, EMULSION INTRAVENOUS at 08:02

## 2018-04-27 RX ADMIN — PROPOFOL 50 MG: 10 INJECTION, EMULSION INTRAVENOUS at 08:06

## 2018-04-27 RX ADMIN — PROPOFOL 50 MG: 10 INJECTION, EMULSION INTRAVENOUS at 08:08

## 2018-04-27 RX ADMIN — PROPOFOL 50 MG: 10 INJECTION, EMULSION INTRAVENOUS at 08:10

## 2018-04-27 NOTE — SERVICEHPINOTES
YULIANA HAMMONDS  presents for a "bypass" Colonoscopy for polyp surveillance.  She is aware of R/B/A as she has had prior scopes. Anesthesia provider has consented patient also.  Updated NP forms reviewed.

## 2018-05-08 ENCOUNTER — TRANSCRIBE ORDERS (OUTPATIENT)
Dept: PHYSICAL THERAPY | Facility: CLINIC | Age: 68
End: 2018-05-08

## 2018-05-08 DIAGNOSIS — M17.0 OSTEOARTHRITIS OF BOTH KNEES, UNSPECIFIED OSTEOARTHRITIS TYPE: Primary | ICD-10-CM

## 2018-05-16 ENCOUNTER — TREATMENT (OUTPATIENT)
Dept: PHYSICAL THERAPY | Facility: CLINIC | Age: 68
End: 2018-05-16

## 2018-05-16 DIAGNOSIS — G89.29 CHRONIC PAIN OF BOTH KNEES: Primary | ICD-10-CM

## 2018-05-16 DIAGNOSIS — M25.561 CHRONIC PAIN OF BOTH KNEES: Primary | ICD-10-CM

## 2018-05-16 DIAGNOSIS — R26.9 GAIT DIFFICULTY: ICD-10-CM

## 2018-05-16 DIAGNOSIS — M25.562 CHRONIC PAIN OF BOTH KNEES: Primary | ICD-10-CM

## 2018-05-16 PROCEDURE — 97110 THERAPEUTIC EXERCISES: CPT | Performed by: PHYSICAL THERAPIST

## 2018-05-16 PROCEDURE — G8979 MOBILITY GOAL STATUS: HCPCS | Performed by: PHYSICAL THERAPIST

## 2018-05-16 PROCEDURE — G8978 MOBILITY CURRENT STATUS: HCPCS | Performed by: PHYSICAL THERAPIST

## 2018-05-16 PROCEDURE — 97161 PT EVAL LOW COMPLEX 20 MIN: CPT | Performed by: PHYSICAL THERAPIST

## 2018-05-16 NOTE — PROGRESS NOTES
"Physical Therapy Initial Evaluation and Plan of Care    Patient: Onelia Alegre   : 1950  Diagnosis/ICD-10 Code:  Chronic pain of both knees [M25.561, M25.562, G89.29]  Referring practitioner: Pat Angel MD  Past medical Hx reviewed: 2018     Subjective Evaluation    History of Present Illness  Onset date: Few years of (B) knee pain.  Mechanism of injury: I have been having knee pain for years but recently has been bad enough to tell the doctor about it.  I have noticed more trouble going up and down stairs.  My laundry is downstairs and I have to go up and down frequently.  Before injections, I had some night time ache in the knees as well.  I can't kneel and occasionally the knee would \"go out on me\", or buckling.  I still occasionally have some discomfort from rolling in bed.  I'm doing fine with driving now but had some trouble before having injections.        Patient Occupation: Not working  Quality of life: good    Pain  Current pain rating: 3  At best pain ratin  At worst pain ratin (since injections)  Location: (B) anterior aspect of the knee.  Infrapatellar.   Quality: dull ache and sharp  Aggravating factors: stairs, ambulation, squatting and lifting    Social Support  Lives in: multiple-level home  Lives with: spouse    Diagnostic Tests  X-ray: abnormal    Treatments  Previous treatment: injection treatment ( )  Patient Goals  Patient goals for therapy: increased strength, improved balance, increased motion, decreased pain and return to sport/leisure activities  Patient goal: Would like to garden.  Deep cleaning home.        Objective       Palpation   Left   Tenderness of the distal biceps femoris.     Right Tenderness of the distal biceps femoris.     Tenderness   Left Knee   Tenderness in the patellar tendon.     Right Knee   Tenderness in the patellar tendon.     Active Range of Motion   Left Knee   Flexion: 126 degrees   Extension: 0 degrees     Right Knee " "  Flexion: 120 degrees   Extension: 0 degrees     Additional Active Range of Motion Details  No pain with knee extension overpressure       Passive Range of Motion   Left Hip   Flexion: WFL  Extension: WFL  External rotation (90/90): 40 degrees   Internal rotation (90/90): WFL    Right Hip   Flexion: WFL  Extension: WFL  External rotation (90/90): 28 degrees   Internal rotation (90/90): WFL    Strength/Myotome Testing     Left Hip   Planes of Motion   Flexion: 4  Extension: 4+  Abduction: 4+  Adduction: 4+    Right Hip   Planes of Motion   Flexion: 4-  Extension: 4+  Abduction: 4+  Adduction: 4    Left Knee   Flexion: 5  Left knee extension strength: 5-/5.  Quadriceps contraction: good    Right Knee   Flexion: 4+  Prone flexion: 4+  Quadriceps contraction: good    Ambulation   Weight-Bearing Status   Weight-Bearing Status (Left): full weight bearing   Weight-Bearing Status (Right): full weight-bearing    Ambulates (ft): Reports up to about a mile.   Assistive device used: none    Observational Gait   Walking speed and stride length within functional limits.     Quality of Movement During Gait     Knee    Knee (Left): Positive valgus.   Knee (Right): Positive valgus.     Functional Assessment     Forward Step Up 8\"   Left Leg  Valgus.     Right Leg  Valgus.     Forward Step Down 8\"   Left Leg  Pain (5/10) and valgus (crepitus noted).     Right Leg  Pain (5/10) and valgus (crepitus noted).     Single Leg Stance   Left: 30 seconds  Right: 5 (Noted decreased capacity and stability) seconds    Comments  Sit to stand: without UE.  Able but with some reported R>L pain.    30 Sit to stand:  9x without UE and Audible crepitus noted.      Assessment & Plan     Assessment  Impairments: abnormal gait, abnormal or restricted ROM, activity intolerance, impaired balance, impaired physical strength, lacks appropriate home exercise program, pain with function and weight-bearing intolerance  Assessment details: Pt presents to PT with " symptoms consistent with (B) arthritic knee pain and joint dysfunction.  Pt will benefit from skilled PT intervention to address the impairments listed and described.    Prognosis: good  Prognosis details:   ST-5 visits   1. Patient to be compliant with stretching and HEP  Able to sit > 30 then stand with < 5/10 pain.   2. L MMT 4+/5 for ability to ascend/descend 10 steps and transfer sit to stand x 10 with knee pain <5/10  3. Increased hip joint, patellar mobility to allow for decreased stress at tibiofemoral, patellofemoral joint. (knee ROM 0°-125°)  4. Pt. to exhibit increased LE soft tissue extensibility to allow for increased ease with walking 20 minutes.   5.  Pt to exhibit improved stability of knee to balance on L 3 sec to promote safety outdoors and uneven terrain.      LT-8 visits   1.  Pt. to score < 20% of disability on WOMAC  2.  L LE strength to at least 5-/5 to ascend/descend 15 steps without pain >2/10 and 1 UE to assist on rail.  3.  Pt to exhibit improved Knee AROM 0°-135° to allow for improved gait, kneeling, bending squatting as is necessary for household tasks.    4.  Pt to exhibit LE endurance/strength to 5-/5 to allow for returning to unrestricted walking > 30 min.    5. Decrease TUG time to <10 sec, increase 30 sec sit to stand to 10 (even foot placement to demonstrate improved function (no pain > 2/10 with tests) .     6.  Pt to demonstrate increased stability of the knee to balance on R for 20 sec as needed to traverse uneven terrain safely.      Functional Limitations: lifting, sleeping, walking, uncomfortable because of pain, sitting, standing and unable to perform repetitive tasks  Plan  Therapy options: will be seen for skilled physical therapy services  Planned modality interventions: TENS, ultrasound and cryotherapy  Planned therapy interventions: ADL retraining, manual therapy, neuromuscular re-education, postural training, soft tissue mobilization, strengthening, stretching,  therapeutic activities, joint mobilization, home exercise program, gait training, functional ROM exercises, flexibility and body mechanics training  Frequency: 2x week  Duration in weeks: 6  Treatment plan discussed with: patient      Manual Therapy:    -     mins  62989;  Therapeutic Exercise:    15     mins  29257;     Neuromuscular Mason:    -    mins  94308;    Therapeutic Activity:     -     mins  47207;     Gait Training:      -     mins  00778;     Ultrasound:     -     mins  52102;    Electrical Stimulation:    -     mins  74905 ( );  Dry Needling     -     mins self-pay    Timed Treatment:   15   mins   Total Treatment:     60   mins    I was present in the PT Department guiding the student by approving, concurring, and confirming the skilled judgement for all services rendered.     PT SIGNATURE: MARIANA Clements License #: [id number]    DATE TREATMENT INITIATED: 5/16/2018    Medicare Initial Certification  Certification Period: 8/14/2018  I certify that the therapy services are furnished while this patient is under my care.  The services outlined above are required by this patient, and will be reviewed every 90 days.     PHYSICIAN: Pat Angel MD      DATE:     Please sign and return via fax to [phone].. Thank you, [facility].

## 2018-05-21 ENCOUNTER — TREATMENT (OUTPATIENT)
Dept: PHYSICAL THERAPY | Facility: CLINIC | Age: 68
End: 2018-05-21

## 2018-05-21 DIAGNOSIS — M25.562 CHRONIC PAIN OF BOTH KNEES: Primary | ICD-10-CM

## 2018-05-21 DIAGNOSIS — G89.29 CHRONIC PAIN OF BOTH KNEES: Primary | ICD-10-CM

## 2018-05-21 DIAGNOSIS — M25.561 CHRONIC PAIN OF BOTH KNEES: Primary | ICD-10-CM

## 2018-05-21 DIAGNOSIS — R26.9 GAIT DIFFICULTY: ICD-10-CM

## 2018-05-21 PROCEDURE — 97110 THERAPEUTIC EXERCISES: CPT | Performed by: PHYSICAL THERAPIST

## 2018-05-21 NOTE — PROGRESS NOTES
"Physical Therapy Daily Progress Note  Visits:2    Subjective : Oenlia Alegre reports:Doing pretty good overall.  The only exercise I had trouble with was the (piriformis) stretch.  I ended up just using my hands to pull the knee across and it felt okay.      Objective:   See Exercise, Manual, and Modality Logs for complete treatment.     Assessment/Plan:Pt tolerated TE progression well but did have noted crepitus with SAQ and 2\" heel taps but no reported pain.  Will attempt manual intervention next visit.      Progress per Plan of Care and Progress strengthening /stabilization /functional activity     Manual Therapy:    -     mins  59962;  Therapeutic Exercise:    35     mins  27695;     Neuromuscular Mason:    -    mins  31234;    Therapeutic Activity:     -     mins  19237;     Gait Training:      -     mins  49369;     Ultrasound:     -     mins  92270;    Electrical Stimulation:    -     mins  00991 ( );  Dry Needling     -     mins self-pay    Timed Treatment:   35   mins   Total Treatment:     35   mins    MARIANA Clements License #[id number]    Physical Therapist  "

## 2018-05-23 ENCOUNTER — TREATMENT (OUTPATIENT)
Dept: PHYSICAL THERAPY | Facility: CLINIC | Age: 68
End: 2018-05-23

## 2018-05-23 DIAGNOSIS — G89.29 CHRONIC PAIN OF BOTH KNEES: Primary | ICD-10-CM

## 2018-05-23 DIAGNOSIS — M25.562 CHRONIC PAIN OF BOTH KNEES: Primary | ICD-10-CM

## 2018-05-23 DIAGNOSIS — M25.561 CHRONIC PAIN OF BOTH KNEES: Primary | ICD-10-CM

## 2018-05-23 DIAGNOSIS — R26.9 GAIT DIFFICULTY: ICD-10-CM

## 2018-05-23 PROCEDURE — 97110 THERAPEUTIC EXERCISES: CPT | Performed by: PHYSICAL THERAPIST

## 2018-05-23 NOTE — PROGRESS NOTES
Physical Therapy Daily Progress Note  Visits:3    Subjective : Onelia Alegre reports: I did pretty good after last visit.  I do think that icing after the session was helpful.  I was not as sore.     Objective:   See Exercise, Manual, and Modality Logs for complete treatment.     Assessment/Plan:Pt tolerated TE and progression to rockerboard well. Strength of the LE continues to be primary focus due to good ROM of both knees.      Progress per Plan of Care and Progress strengthening /stabilization /functional activity       Manual Therapy:    -     mins  01647;  Therapeutic Exercise:    35     mins  13178;     Neuromuscular Mason:    2    mins  09695;    Therapeutic Activity:     -     mins  06251;     Gait Training:      -     mins  93647;     Ultrasound:     -     mins  10634;    Electrical Stimulation:    -     mins  28514 ( );  Dry Needling     -     mins self-pay    Timed Treatment:   37   mins   Total Treatment:     47   mins    MARIANA Clements License #[id number]    Physical Therapist

## 2018-05-30 ENCOUNTER — TREATMENT (OUTPATIENT)
Dept: PHYSICAL THERAPY | Facility: CLINIC | Age: 68
End: 2018-05-30

## 2018-05-30 DIAGNOSIS — G89.29 CHRONIC PAIN OF BOTH KNEES: Primary | ICD-10-CM

## 2018-05-30 DIAGNOSIS — M25.562 CHRONIC PAIN OF BOTH KNEES: Primary | ICD-10-CM

## 2018-05-30 DIAGNOSIS — R26.9 GAIT DIFFICULTY: ICD-10-CM

## 2018-05-30 DIAGNOSIS — M25.561 CHRONIC PAIN OF BOTH KNEES: Primary | ICD-10-CM

## 2018-05-30 PROCEDURE — 97110 THERAPEUTIC EXERCISES: CPT | Performed by: PHYSICAL THERAPIST

## 2018-05-30 NOTE — PROGRESS NOTES
Physical Therapy Daily Progress Note  Visits:4    Subjective : Onelia Alegre reports: I can really tell a difference in my strength.  I feel much less pain as well.  I used to think about my knees all the time, and now I barely notice, just depends on what I do.      Objective   See Exercise, Manual, and Modality Logs for complete treatment.     Assessment/Plan:Pt is doing excellent with PT intervention and demonstrates good understanding and compliance to HEP.  Her knee / hip strength is much improved, completing all of her repetitions without much rest needed between sets.  We may practice with floor to stand transfers next visit due to report of that being remaining difficult task.      Progress per Plan of Care and Progress strengthening /stabilization /functional activity         Manual Therapy:    -     mins  51885;  Therapeutic Exercise:    32     mins  20042;     Neuromuscular Msaon:    4    mins  53370;    Therapeutic Activity:     -     mins  72404;     Gait Training:      -     mins  35055;     Ultrasound:     -     mins  78390;    Electrical Stimulation:    -     mins  87603 ( );  Dry Needling     -     mins self-pay    Timed Treatment:   36   mins   Total Treatment:     46   mins    MARIANA Clements License #[id number]    Physical Therapist

## 2018-06-04 ENCOUNTER — TREATMENT (OUTPATIENT)
Dept: PHYSICAL THERAPY | Facility: CLINIC | Age: 68
End: 2018-06-04

## 2018-06-04 DIAGNOSIS — G89.29 CHRONIC PAIN OF BOTH KNEES: Primary | ICD-10-CM

## 2018-06-04 DIAGNOSIS — M25.562 CHRONIC PAIN OF BOTH KNEES: Primary | ICD-10-CM

## 2018-06-04 DIAGNOSIS — R26.9 GAIT DIFFICULTY: ICD-10-CM

## 2018-06-04 DIAGNOSIS — M25.561 CHRONIC PAIN OF BOTH KNEES: Primary | ICD-10-CM

## 2018-06-04 PROCEDURE — 97110 THERAPEUTIC EXERCISES: CPT | Performed by: PHYSICAL THERAPIST

## 2018-06-04 NOTE — PROGRESS NOTES
Physical Therapy Daily Progress Note  Visits:5    Subjective : Onelia Alegre reports: I feel pretty good.  I think I'll be ready for discharge by my next visit.  I had a good weekend overall.      Objective   See Exercise, Manual, and Modality Logs for complete treatment.     Assessment/Plan:Knee discomfort noted with sink squats but after review, she had much less pain.  She had a tendency to allow the knees to fall into valgus vs keeping the knee flexion aligned with the direction of the foot.  Also, we lessened the depth of the squat as well.      Anticipate DC next Visit       Manual Therapy:    -     mins  00970;  Therapeutic Exercise:    30     mins  81096;     Neuromuscular Mason:    4    mins  03347;    Therapeutic Activity:     -     mins  64383;     Gait Training:      -     mins  28216;     Ultrasound:     -     mins  18981;    Electrical Stimulation:    --     mins  14621 ( );  Dry Needling     -     mins self-pay    Timed Treatment:   34   mins   Total Treatment:     44   mins    MARIANA Clements License #[id number]    Physical Therapist

## 2018-06-06 ENCOUNTER — TREATMENT (OUTPATIENT)
Dept: PHYSICAL THERAPY | Facility: CLINIC | Age: 68
End: 2018-06-06

## 2018-06-06 DIAGNOSIS — M25.561 CHRONIC PAIN OF BOTH KNEES: Primary | ICD-10-CM

## 2018-06-06 DIAGNOSIS — R26.9 GAIT DIFFICULTY: ICD-10-CM

## 2018-06-06 DIAGNOSIS — G89.29 CHRONIC PAIN OF BOTH KNEES: Primary | ICD-10-CM

## 2018-06-06 DIAGNOSIS — M25.562 CHRONIC PAIN OF BOTH KNEES: Primary | ICD-10-CM

## 2018-06-06 PROCEDURE — 97110 THERAPEUTIC EXERCISES: CPT | Performed by: PHYSICAL THERAPIST

## 2018-06-06 PROCEDURE — G8979 MOBILITY GOAL STATUS: HCPCS | Performed by: PHYSICAL THERAPIST

## 2018-06-06 PROCEDURE — G8978 MOBILITY CURRENT STATUS: HCPCS | Performed by: PHYSICAL THERAPIST

## 2018-06-06 NOTE — PROGRESS NOTES
"Physical Therapy Discharge Note  Visits:6    Subjective : Onelia Alegre reports: I am doing much better overall.  I have more strength and mobility in my legs since coming to PT.  The only things that still bother me include sitting too long then standing with some stiffness and deep squatting motions can bother me.  Otherwise, I do well walking, rolling in bed, driving and doing some steps (just not too many).      Objective   Active Range of Motion   Left Knee   Flexion: 132 degrees, at eval: 126 degrees   Extension: 0 degrees      Right Knee   Flexion: 126 degrees.   at eval: 120 degrees   Extension: 0 degrees     Additional Active Range of Motion Details  No pain with knee extension overpressure       Passive Range of Motion   Left Hip   Flexion: WFL  Extension: WFL  External rotation (90/90): 40 degrees   Internal rotation (90/90): WFL     Right Hip   Flexion: WFL  Extension: WFL  External rotation (90/90): 33 at eval: 28 degrees   Internal rotation (90/90): WFL     Strength/Myotome Testing      Left Hip   Planes of Motion   Flexion: 5-  Extension: 5  Abduction: 4+  Adduction: 5-     Right Hip   Planes of Motion   Flexion: 5  Extension: 5  Abduction: 5-  Adduction: 5     Left Knee   Flexion: 5  Left knee extension strength: 5-/5.  Quadriceps contraction: good     Right Knee   Flexion: 5  Prone flexion: 5  Quadriceps contraction: good     Observational Gait   Walking speed and stride length within functional limits.      Quality of Movement During Gait      Knee     Knee (Left): Positive valgus.   Knee (Right): Positive valgus.      Functional Assessment      Forward Step Up 8\"   Left Leg  Valgus.      Right Leg  Valgus.      Forward Step Down 8\"   Left Leg  Pain (5/10) and valgus (crepitus noted).      Right Leg  Pain (5/10) and valgus (crepitus noted).      Single Leg Stance   Left: 30 seconds  Right: 20 sec. At eval: 5 (Noted decreased capacity and stability) seconds     Comments  Sit to stand: without UE. "  Able but with some reported R>L pain.    30 Sit to stand:  10x without UE and Audible crepitus noted. (9x at eval)   See Exercise, Manual, and Modality Logs for complete treatment.     Assessment & Plan     Assessment  Impairments: abnormal or restricted ROM and impaired physical strength  Assessment details: Onelia has done very well with PT intervention and has exhibited excellent compliance and understanding of HEP.  R hip tightness and L LE weakness are ongoing but very slight impairments that will be addressed further with continuation of modified HEP.    Prognosis: good    Goals  Plan Goals: ST-5 visits (All MET)  1. Patient to be compliant with stretching and HEP  Able to sit > 30 then stand with < 5/10 pain.   2. L MMT 4+/5 for ability to ascend/descend 10 steps and transfer sit to stand x 10 with knee pain <5/10  3. Increased hip joint, patellar mobility to allow for decreased stress at tibiofemoral, patellofemoral joint. (knee ROM 0°-125°)  4. Pt. to exhibit increased LE soft tissue extensibility to allow for increased ease with walking 20 minutes.   5.  Pt to exhibit improved stability of knee to balance on L 3 sec to promote safety outdoors and uneven terrain.      LT-8 visits   1.  Pt. to score < 20% of disability on WOMAC (MET)  2.  L LE strength to at least 5-/5 to ascend/descend 15 steps without pain >2/10 and 1 UE to assist on rail.  (MET)   3.  Pt to exhibit improved Knee AROM 0°-135° to allow for improved gait, kneeling, bending squatting as is necessary for household tasks.  (Not met, good progress toward goal)   4.  Pt to exhibit LE endurance/strength to 5-/5 to allow for returning to unrestricted walking > 30 min. (MET)     5. Decrease TUG time to <10 sec, increase 30 sec sit to stand to 10 (even foot placement to demonstrate improved function (no pain > 2/10 with tests). (not, MET: sit to stand 9x with some discomfrot)    6.  Pt to demonstrate increased stability of the knee to  balance on R for 20 sec as needed to traverse uneven terrain safely. (MET)     Plan  Therapy options: will not be seen for skilled physical therapy services  Planned therapy interventions: home exercise program  Treatment plan discussed with: patient      Other       Manual Therapy:    -     mins  27121;  Therapeutic Exercise:    25     mins  08365;     Neuromuscular Mason:    -    mins  90892;    Therapeutic Activity:     8     mins  50311;   Tests and measures   Gait Training:      -     mins  11274;     Ultrasound:     -     mins  87471;    Electrical Stimulation:    -     mins  76529 ( );  Dry Needling     -     mins self-pay    Timed Treatment:   33   mins   Total Treatment:     33   mins    MARIANA Clements License #[id number]    Physical Therapist

## 2018-07-14 ENCOUNTER — OFFICE VISIT (OUTPATIENT)
Dept: RETAIL CLINIC | Facility: CLINIC | Age: 68
End: 2018-07-14

## 2018-07-14 VITALS
DIASTOLIC BLOOD PRESSURE: 91 MMHG | TEMPERATURE: 97.8 F | OXYGEN SATURATION: 97 % | SYSTOLIC BLOOD PRESSURE: 152 MMHG | HEART RATE: 92 BPM

## 2018-07-14 DIAGNOSIS — L23.9 ALLERGIC DERMATITIS: Primary | ICD-10-CM

## 2018-07-14 PROCEDURE — 99213 OFFICE O/P EST LOW 20 MIN: CPT | Performed by: NURSE PRACTITIONER

## 2018-07-14 RX ORDER — PREDNISONE 20 MG/1
TABLET ORAL
Qty: 12 TABLET | Refills: 0 | Status: SHIPPED | OUTPATIENT
Start: 2018-07-14 | End: 2018-09-27

## 2018-07-14 RX ORDER — TRIAMCINOLONE ACETONIDE 5 MG/G
CREAM TOPICAL 3 TIMES DAILY
Qty: 30 G | Refills: 0 | Status: SHIPPED | OUTPATIENT
Start: 2018-07-14 | End: 2018-09-27

## 2018-07-14 NOTE — PROGRESS NOTES
Subjective:     Onelia Alegre is a 67 y.o.     Rash   The current episode started in the past 7 days (was at beach last week, rash started 2 days later, reports not really being outside since coming home). The affected locations include the right foot. The rash is characterized by burning, itchiness, redness and swelling. Pertinent negatives include no fatigue, fever or sore throat. Treatments tried: cortisone 10 and soaked in espom salts. The treatment provided no relief.         The following portions of the patient's history were reviewed and updated as appropriate: allergies, current medications, past family history, past medical history, past social history, past surgical history and problem list.      Review of Systems   Constitutional: Negative for fatigue and fever.   HENT: Negative for sore throat.    Respiratory: Negative.    Cardiovascular:        Hx palpitations, hypertension   Skin: Positive for rash (top of right foot).         Objective:      Physical Exam   Cardiovascular: Normal rate, regular rhythm and normal heart sounds.    Pulmonary/Chest: Effort normal and breath sounds normal.   Skin:   Erythematous rash noted superior right foot and toes, started as maculopapular rash as noted in pictures but is now one large erythematous region lore apple sized   Vitals reviewed.          Diagnoses and all orders for this visit:    Allergic dermatitis    Other orders  -     triamcinolone (KENALOG) 0.5 % cream; Apply  topically 3 (Three) Times a Day. To affected area for contact dermatitis  -     predniSONE (DELTASONE) 20 MG tablet; 20mg tabs, 3 for 2 days, 2 for 2 days, 1 for 2 days

## 2018-07-14 NOTE — PATIENT INSTRUCTIONS
Contact Dermatitis  Dermatitis is redness, soreness, and swelling (inflammation) of the skin. Contact dermatitis is a reaction to certain substances that touch the skin. There are two types of contact dermatitis:  · Irritant contact dermatitis. This type is caused by something that irritates your skin, such as dry hands from washing them too much. This type does not require previous exposure to the substance for a reaction to occur. This type is more common.  · Allergic contact dermatitis. This type is caused by a substance that you are allergic to, such as a nickel allergy or poison ivy. This type only occurs if you have been exposed to the substance (allergen) before. Upon a repeat exposure, your body reacts to the substance. This type is less common.    What are the causes?  Many different substances can cause contact dermatitis. Irritant contact dermatitis is most commonly caused by exposure to:  · Makeup.  · Soaps.  · Detergents.  · Bleaches.  · Acids.  · Metal salts, such as nickel.    Allergic contact dermatitis is most commonly caused by exposure to:  · Poisonous plants.  · Chemicals.  · Jewelry.  · Latex.  · Medicines.  · Preservatives in products, such as clothing.    What increases the risk?  This condition is more likely to develop in:  · People who have jobs that expose them to irritants or allergens.  · People who have certain medical conditions, such as asthma or eczema.    What are the signs or symptoms?  Symptoms of this condition may occur anywhere on your body where the irritant has touched you or is touched by you. Symptoms include:  · Dryness or flaking.  · Redness.  · Cracks.  · Itching.  · Pain or a burning feeling.  · Blisters.  · Drainage of small amounts of blood or clear fluid from skin cracks.    With allergic contact dermatitis, there may also be swelling in areas such as the eyelids, mouth, or genitals.  How is this diagnosed?  This condition is diagnosed with a medical history and  physical exam. A patch skin test may be performed to help determine the cause. If the condition is related to your job, you may need to see an occupational medicine specialist.  How is this treated?  Treatment for this condition includes figuring out what caused the reaction and protecting your skin from further contact. Treatment may also include:  · Steroid creams or ointments. Oral steroid medicines may be needed in more severe cases.  · Antibiotics or antibacterial ointments, if a skin infection is present.  · Antihistamine lotion or an antihistamine taken by mouth to ease itching.  · A bandage (dressing).    Follow these instructions at home:  Skin Care  · Moisturize your skin as needed.  · Apply cool compresses to the affected areas.  · Try taking a bath with:  ? Epsom salts. Follow the instructions on the packaging. You can get these at your local pharmacy or grocery store.  ? Baking soda. Pour a small amount into the bath as directed by your health care provider.  ? Colloidal oatmeal. Follow the instructions on the packaging. You can get this at your local pharmacy or grocery store.  · Try applying baking soda paste to your skin. Stir water into baking soda until it reaches a paste-like consistency.  · Do not scratch your skin.  · Bathe less frequently, such as every other day.  · Bathe in lukewarm water. Avoid using hot water.  Medicines  · Take or apply over-the-counter and prescription medicines only as told by your health care provider.  · If you were prescribed an antibiotic medicine, take or apply your antibiotic as told by your health care provider. Do not stop using the antibiotic even if your condition starts to improve.  General instructions  · Keep all follow-up visits as told by your health care provider. This is important.  · Avoid the substance that caused your reaction. If you do not know what caused it, keep a journal to try to track what caused it. Write down:  ? What you eat.  ? What  cosmetic products you use.  ? What you drink.  ? What you wear in the affected area. This includes jewelry.  · If you were given a dressing, take care of it as told by your health care provider. This includes when to change and remove it.  Contact a health care provider if:  · Your condition does not improve with treatment.  · Your condition gets worse.  · You have signs of infection such as swelling, tenderness, redness, soreness, or warmth in the affected area.  · You have a fever.  · You have new symptoms.  Get help right away if:  · You have a severe headache, neck pain, or neck stiffness.  · You vomit.  · You feel very sleepy.  · You notice red streaks coming from the affected area.  · Your bone or joint underneath the affected area becomes painful after the skin has healed.  · The affected area turns darker.  · You have difficulty breathing.  This information is not intended to replace advice given to you by your health care provider. Make sure you discuss any questions you have with your health care provider.  Document Released: 12/15/2001 Document Revised: 05/25/2017 Document Reviewed: 05/04/2016  [employer] Interactive Patient Education © 2018 [employer]

## 2018-09-18 DIAGNOSIS — E55.9 VITAMIN D DEFICIENCY: ICD-10-CM

## 2018-09-18 DIAGNOSIS — M85.80 OSTEOPENIA, UNSPECIFIED LOCATION: ICD-10-CM

## 2018-09-18 DIAGNOSIS — N95.1 SYMPTOMATIC MENOPAUSAL OR FEMALE CLIMACTERIC STATES: ICD-10-CM

## 2018-09-18 DIAGNOSIS — E79.0 HYPERURICEMIA: ICD-10-CM

## 2018-09-18 DIAGNOSIS — I10 BENIGN ESSENTIAL HYPERTENSION: ICD-10-CM

## 2018-09-20 LAB
25(OH)D3+25(OH)D2 SERPL-MCNC: 91.5 NG/ML (ref 30–100)
ALBUMIN SERPL-MCNC: 4.3 G/DL (ref 3.5–5.2)
ALBUMIN/GLOB SERPL: 1.9 G/DL
ALP SERPL-CCNC: 81 U/L (ref 39–117)
ALT SERPL-CCNC: 16 U/L (ref 1–33)
AST SERPL-CCNC: 18 U/L (ref 1–32)
BILIRUB SERPL-MCNC: 0.9 MG/DL (ref 0.1–1.2)
BUN SERPL-MCNC: 17 MG/DL (ref 8–23)
BUN/CREAT SERPL: 18.3 (ref 7–25)
C PEPTIDE SERPL-MCNC: 4.2 NG/ML (ref 1.1–4.4)
CALCIUM SERPL-MCNC: 10 MG/DL (ref 8.6–10.5)
CHLORIDE SERPL-SCNC: 100 MMOL/L (ref 98–107)
CHOLEST SERPL-MCNC: 135 MG/DL (ref 0–200)
CO2 SERPL-SCNC: 24.6 MMOL/L (ref 22–29)
CREAT SERPL-MCNC: 0.93 MG/DL (ref 0.57–1)
GLOBULIN SER CALC-MCNC: 2.3 GM/DL
GLUCOSE SERPL-MCNC: 89 MG/DL (ref 65–99)
HBA1C MFR BLD: 5.4 % (ref 4.8–5.6)
HDLC SERPL-MCNC: 62 MG/DL (ref 40–60)
LDLC SERPL CALC-MCNC: 51 MG/DL (ref 0–100)
POTASSIUM SERPL-SCNC: 4.5 MMOL/L (ref 3.5–5.2)
PROT SERPL-MCNC: 6.6 G/DL (ref 6–8.5)
SODIUM SERPL-SCNC: 138 MMOL/L (ref 136–145)
T3FREE SERPL-MCNC: 3 PG/ML (ref 2–4.4)
T4 FREE SERPL-MCNC: 2.18 NG/DL (ref 0.93–1.7)
T4 SERPL-MCNC: 12.09 MCG/DL (ref 4.5–11.7)
THYROGLOB AB SERPL-ACNC: <1 IU/ML (ref 0–0.9)
THYROGLOB SERPL-MCNC: 12.4 NG/ML (ref 1.5–38.5)
TRIGL SERPL-MCNC: 112 MG/DL (ref 0–150)
TSH SERPL DL<=0.005 MIU/L-ACNC: 1.12 MIU/ML (ref 0.27–4.2)
URATE SERPL-MCNC: 4.2 MG/DL (ref 2.4–5.7)
VLDLC SERPL CALC-MCNC: 22.4 MG/DL (ref 5–40)

## 2018-09-27 ENCOUNTER — OFFICE VISIT (OUTPATIENT)
Dept: FAMILY MEDICINE CLINIC | Facility: CLINIC | Age: 68
End: 2018-09-27

## 2018-09-27 VITALS
SYSTOLIC BLOOD PRESSURE: 120 MMHG | HEIGHT: 66 IN | HEART RATE: 92 BPM | TEMPERATURE: 97.9 F | DIASTOLIC BLOOD PRESSURE: 80 MMHG | WEIGHT: 185 LBS | BODY MASS INDEX: 29.73 KG/M2 | RESPIRATION RATE: 16 BRPM | OXYGEN SATURATION: 97 %

## 2018-09-27 DIAGNOSIS — I10 BENIGN ESSENTIAL HYPERTENSION: ICD-10-CM

## 2018-09-27 DIAGNOSIS — B35.9 TINEA: ICD-10-CM

## 2018-09-27 DIAGNOSIS — N28.9 RENAL INSUFFICIENCY: ICD-10-CM

## 2018-09-27 DIAGNOSIS — Z23 IMMUNIZATION DUE: Primary | ICD-10-CM

## 2018-09-27 DIAGNOSIS — Z12.31 ENCOUNTER FOR SCREENING MAMMOGRAM FOR BREAST CANCER: ICD-10-CM

## 2018-09-27 DIAGNOSIS — E03.9 PRIMARY HYPOTHYROIDISM: ICD-10-CM

## 2018-09-27 DIAGNOSIS — Z78.0 POST-MENOPAUSAL: ICD-10-CM

## 2018-09-27 PROCEDURE — 99214 OFFICE O/P EST MOD 30 MIN: CPT | Performed by: PHYSICIAN ASSISTANT

## 2018-09-27 PROCEDURE — G0439 PPPS, SUBSEQ VISIT: HCPCS | Performed by: PHYSICIAN ASSISTANT

## 2018-09-27 PROCEDURE — 90662 IIV NO PRSV INCREASED AG IM: CPT | Performed by: PHYSICIAN ASSISTANT

## 2018-09-27 PROCEDURE — G0008 ADMIN INFLUENZA VIRUS VAC: HCPCS | Performed by: PHYSICIAN ASSISTANT

## 2018-09-27 RX ORDER — CLOTRIMAZOLE 1 %
CREAM (GRAM) TOPICAL EVERY 12 HOURS SCHEDULED
Qty: 60 G | Refills: 1 | Status: SHIPPED | OUTPATIENT
Start: 2018-09-27 | End: 2019-09-30

## 2018-09-27 RX ORDER — METOPROLOL SUCCINATE 50 MG/1
50 TABLET, EXTENDED RELEASE ORAL DAILY
Qty: 90 TABLET | Refills: 3 | Status: SHIPPED | OUTPATIENT
Start: 2018-09-27 | End: 2019-03-28

## 2018-09-27 RX ORDER — TRIAMTERENE AND HYDROCHLOROTHIAZIDE 37.5; 25 MG/1; MG/1
1 CAPSULE ORAL EVERY MORNING
Qty: 90 CAPSULE | Refills: 3 | Status: SHIPPED | OUTPATIENT
Start: 2018-09-27 | End: 2019-03-28 | Stop reason: SDUPTHER

## 2018-09-27 NOTE — PROGRESS NOTES
Subjective   Onelia Alegre is a 67 y.o. female.     History of Present Illness   Onelia Alegre 67 y.o. female who presents today for routine follow up check and medication refills.  she has a history of   Patient Active Problem List   Diagnosis   • Benign essential hypertension   • Osteopenia   • Excess weight   • Localized edema   • Thyroid function test abnormal   • Primary hypothyroidism   • Vitamin D deficiency   • Dyslipidemia   • Hyperuricemia   • Symptomatic menopausal or female climacteric states   • Renal insufficiency   .  Since the last visit, she has overall felt well.  She has Hypertenision and is well controlled on medication, Hyperlipidemia and is well controlled on medication, Hypothyroidism and under the care of Endocrinologist and Vitamin D deficiency and well controlled on medication and labs at goal >30.  she has been compliant with current medications have reviewed them.  The patient denies medication side effects.  Rash since July right foot and went UC and Dx contact and no help steroid  Intol to Actonel in past?  achy  Results for orders placed or performed in visit on 09/18/18   T3, Free   Result Value Ref Range    T3, Free 3.0 2.0 - 4.4 pg/mL   T4 & TSH ([facility])   Result Value Ref Range    TSH 1.120 0.270 - 4.200 mIU/mL    T4, Total 12.09 (H) 4.50 - 11.70 mcg/dL   T4, Free   Result Value Ref Range    Free T4 2.18 (H) 0.93 - 1.70 ng/dL   Uric Acid   Result Value Ref Range    Uric Acid 4.2 2.4 - 5.7 mg/dL   Vitamin D 25 Hydroxy   Result Value Ref Range    25 Hydroxy, Vitamin D 91.5 30.0 - 100.0 ng/ml   Comprehensive Metabolic Panel   Result Value Ref Range    Glucose 89 65 - 99 mg/dL    BUN 17 8 - 23 mg/dL    Creatinine 0.93 0.57 - 1.00 mg/dL    eGFR Non African Am 60 (L) >60 mL/min/1.73    eGFR African Am 72 >60 mL/min/1.73    BUN/Creatinine Ratio 18.3 7.0 - 25.0    Sodium 138 136 - 145 mmol/L    Potassium 4.5 3.5 - 5.2 mmol/L    Chloride 100 98 - 107 mmol/L    Total CO2 24.6 22.0  - 29.0 mmol/L    Calcium 10.0 8.6 - 10.5 mg/dL    Total Protein 6.6 6.0 - 8.5 g/dL    Albumin 4.30 3.50 - 5.20 g/dL    Globulin 2.3 gm/dL    A/G Ratio 1.9 g/dL    Total Bilirubin 0.9 0.1 - 1.2 mg/dL    Alkaline Phosphatase 81 39 - 117 U/L    AST (SGOT) 18 1 - 32 U/L    ALT (SGPT) 16 1 - 33 U/L   C-Peptide   Result Value Ref Range    C-Peptide 4.2 1.1 - 4.4 ng/mL   Hemoglobin A1c   Result Value Ref Range    Hemoglobin A1C 5.40 4.80 - 5.60 %   Lipid Panel   Result Value Ref Range    Total Cholesterol 135 0 - 200 mg/dL    Triglycerides 112 0 - 150 mg/dL    HDL Cholesterol 62 (H) 40 - 60 mg/dL    VLDL Cholesterol 22.4 5 - 40 mg/dL    LDL Cholesterol  51 0 - 100 mg/dL   Thyroglobulin With Anti-TG   Result Value Ref Range    Thyroglobulin Ab <1.0 0.0 - 0.9 IU/mL   Thyroglobulin By EMILIANO   Result Value Ref Range    THYROGLOBULIN BY EMILIANO 12.4 1.5 - 38.5 ng/mL     I want to get CBC and check B12 and folate    Saw ortho and injected knees and went to PT and did help   Do suggest shingles vaccine  She is on bp med for HTN and beta bl for palpitations  Dyazide for bp and edema  No gout  The following portions of the patient's history were reviewed and updated as appropriate: allergies, current medications, past family history, past medical history, past social history, past surgical history and problem list.    Review of Systems   Constitutional: Negative for activity change, appetite change and unexpected weight change.   HENT: Negative for nosebleeds and trouble swallowing.    Eyes: Negative for pain and visual disturbance.   Respiratory: Negative for chest tightness, shortness of breath and wheezing.    Cardiovascular: Negative for chest pain and palpitations.   Gastrointestinal: Negative for abdominal pain and blood in stool.   Endocrine: Negative.    Genitourinary: Negative for difficulty urinating and hematuria.   Musculoskeletal: Positive for arthralgias. Negative for joint swelling.   Skin: Positive for rash. Negative for  color change.   Allergic/Immunologic: Negative.    Neurological: Negative for syncope and speech difficulty.   Hematological: Negative for adenopathy.   Psychiatric/Behavioral: Negative for agitation and confusion.   All other systems reviewed and are negative.      Objective   Physical Exam   Constitutional: She is oriented to person, place, and time. She appears well-developed and well-nourished. No distress.   HENT:   Head: Normocephalic and atraumatic.   Eyes: Pupils are equal, round, and reactive to light. Conjunctivae and EOM are normal. Right eye exhibits no discharge. Left eye exhibits no discharge. No scleral icterus.   Neck: Normal range of motion. Neck supple. No tracheal deviation present. No thyromegaly present.   Cardiovascular: Normal rate, regular rhythm, normal heart sounds, intact distal pulses and normal pulses.  Exam reveals no gallop.    No murmur heard.  Pulmonary/Chest: Effort normal and breath sounds normal. No respiratory distress. She has no wheezes. She has no rales.   Musculoskeletal: Normal range of motion.   Neurological: She is alert and oriented to person, place, and time. She exhibits normal muscle tone. Coordination normal.   Skin: Skin is warm. Rash noted. There is erythema. No pallor.   Right foot dorsal is scaling, red, with maceration between toes; papular linear border area proximal---this looks fungal and will Rx Clotrimazole crm and refer derm if no change   Psychiatric: She has a normal mood and affect. Her behavior is normal. Judgment and thought content normal.   Nursing note and vitals reviewed.      Assessment/Plan   Onelia was seen today for hypertension.    Diagnoses and all orders for this visit:    Immunization due  -     Fluzone High Dose =>65Years    Encounter for screening mammogram for breast cancer  -     Mammo Screening Bilateral With CAD; Future  -     DEXA Assess Fracture Vertebral; Future  -     CBC & Differential  -     Vitamin B12  -      Folate    Post-menopausal  -     Mammo Screening Bilateral With CAD; Future  -     DEXA Assess Fracture Vertebral; Future  -     CBC & Differential  -     Vitamin B12  -     Folate    Tinea  -     CBC & Differential  -     Vitamin B12  -     Folate    Primary hypothyroidism  -     CBC & Differential  -     Vitamin B12  -     Folate    Benign essential hypertension  -     CBC & Differential  -     Vitamin B12  -     Folate    Renal insufficiency  -     CBC & Differential  -     Vitamin B12  -     Folate    Other orders  -     triamterene-hydrochlorothiazide (DYAZIDE) 37.5-25 MG per capsule; Take 1 capsule by mouth Every Morning. For BP and edema  -     metoprolol succinate XL (TOPROL-XL) 50 MG 24 hr tablet; Take 1 tablet by mouth Daily. For BP and palpitations  -     clotrimazole (LOTRIMIN AF) 1 % cream; Apply  topically to the appropriate area as directed Every 12 (Twelve) Hours. To rash right foot until 2 weeks past clear

## 2018-09-27 NOTE — PROGRESS NOTES
QUICK REFERENCE INFORMATION:  The ABCs of the Annual Wellness Visit    Subsequent Medicare Wellness Visit    HEALTH RISK ASSESSMENT    1950    Recent Hospitalizations:  No hospitalization(s) within the last year..        Current Medical Providers:  Patient Care Team:  Raven Travis PA-C as PCP - General (Family Medicine)        Smoking Status:  History   Smoking Status   • Never Smoker   Smokeless Tobacco   • Never Used       Alcohol Consumption:  History   Alcohol Use   • Yes     Comment: social       Depression Screen:   PHQ-2/PHQ-9 Depression Screening 9/27/2018   Little interest or pleasure in doing things 0   Feeling down, depressed, or hopeless 0   Total Score 0       Health Habits and Functional and Cognitive Screening:  Functional & Cognitive Status 9/27/2018   Do you have difficulty preparing food and eating? No   Do you have difficulty bathing yourself, getting dressed or grooming yourself? No   Do you have difficulty using the toilet? No   Do you have difficulty moving around from place to place? No   Do you have trouble with steps or getting out of a bed or a chair? No   In the past year have you fallen or experienced a near fall? No   Current Diet Well Balanced Diet   Dental Exam Up to date   Eye Exam Up to date   Exercise (times per week) 3 times per week   Current Exercise Activities Include Walking   Do you need help using the phone?  No   Are you deaf or do you have serious difficulty hearing?  No   Do you need help with transportation? No   Do you need help shopping? No   Do you need help preparing meals?  No   Do you need help with housework?  No   Do you need help with laundry? No   Do you need help taking your medications? No   Do you need help managing money? No   Do you ever drive or ride in a car without wearing a seat belt? No   Have you felt unusual stress, anger or loneliness in the last month? No   Who do you live with? Spouse   If you need help, do you have trouble finding someone  available to you? No   Have you been bothered in the last four weeks by sexual problems? No   Do you have difficulty concentrating, remembering or making decisions? No           Does the patient have evidence of cognitive impairment? No    Aspirin use counseling: Taking ASA appropriately as indicated      Recent Lab Results:  CMP:  Lab Results   Component Value Date    GLU 89 09/18/2018    BUN 17 09/18/2018    CREATININE 0.93 09/18/2018    EGFRIFNONA 60 (L) 09/18/2018    EGFRIFAFRI 72 09/18/2018    BCR 18.3 09/18/2018     09/18/2018    K 4.5 09/18/2018    CO2 24.6 09/18/2018    CALCIUM 10.0 09/18/2018    PROTENTOTREF 6.6 09/18/2018    ALBUMIN 4.30 09/18/2018    LABGLOBREF 2.3 09/18/2018    LABIL2 1.9 09/18/2018    BILITOT 0.9 09/18/2018    ALKPHOS 81 09/18/2018    AST 18 09/18/2018    ALT 16 09/18/2018     Lipid Panel:  Lab Results   Component Value Date    TRIG 112 09/18/2018    HDL 62 (H) 09/18/2018    VLDL 22.4 09/18/2018    LDLHDL 1.6 07/28/2015     HbA1c:  Lab Results   Component Value Date    HGBA1C 5.40 09/18/2018       Visual Acuity:  No exam data present    Age-appropriate Screening Schedule:  Refer to the list below for future screening recommendations based on patient's age, sex and/or medical conditions. Orders for these recommended tests are listed in the plan section. The patient has been provided with a written plan.    Health Maintenance   Topic Date Due   • ZOSTER VACCINE (2 of 3) 02/26/2009   • DXA SCAN  07/20/2018   • INFLUENZA VACCINE  08/01/2018   • MAMMOGRAM  01/03/2019   • LIPID PANEL  09/18/2019   • COLONOSCOPY  04/27/2023   • TDAP/TD VACCINES (3 - Td) 12/07/2026   • PNEUMOCOCCAL VACCINES (65+ LOW/MEDIUM RISK)  Completed        Subjective   History of Present Illness    Onelia Alegre is a 67 y.o. female who presents for an Subsequent Wellness Visit.    The following portions of the patient's history were reviewed and updated as appropriate: allergies, current medications, past  family history, past medical history, past social history, past surgical history and problem list.    Outpatient Medications Prior to Visit   Medication Sig Dispense Refill   • allopurinol (ZYLOPRIM) 100 MG tablet Take 1 tablet by mouth Daily. 30 tablet 5   • aspirin 81 MG EC tablet Take 81 mg by mouth daily.     • atorvastatin (LIPITOR) 10 MG tablet Take 1 tablet by mouth Daily. For cholesterol 90 tablet 3   • Calcium Carbonate-Vitamin D (CALCIUM-VITAMIN D) 600-200 MG-UNIT capsule Take by mouth.     • cetirizine (ZyrTEC) 10 MG tablet Take 10 mg by mouth daily. Take one tablet daily as needed     • cholecalciferol (VITAMIN D3) 1000 UNITS tablet Take 2,000 Units by mouth Daily.     • estradiol (ESTRACE) 1 MG tablet Take 1 tablet by mouth Daily. 30 tablet 5   • melatonin tablet Take by mouth.     • metoprolol succinate XL (TOPROL-XL) 50 MG 24 hr tablet Take 1 tablet by mouth Daily. For BP and palpitations 90 tablet 3   • Multiple Vitamin (MULTIVITAMIN) tablet Take 1 tablet by mouth daily.     • SYNTHROID 125 MCG tablet Take 1 tablet by mouth Daily. On empty stomach 30 tablet 5   • triamcinolone (KENALOG) 0.5 % cream Apply  topically 3 (Three) Times a Day. To affected area for contact dermatitis 30 g 0   • triamterene-hydrochlorothiazide (DYAZIDE) 37.5-25 MG per capsule Take 1 capsule by mouth Every Morning. For BP and edema 90 capsule 3   • predniSONE (DELTASONE) 20 MG tablet 20mg tabs, 3 for 2 days, 2 for 2 days, 1 for 2 days 12 tablet 0   • progesterone (PROMETRIUM) 200 MG capsule Take 1 capsule by mouth Daily. 30 capsule 5     No facility-administered medications prior to visit.        Patient Active Problem List   Diagnosis   • Benign essential hypertension   • Osteopenia   • Excess weight   • Localized edema   • Thyroid function test abnormal   • Primary hypothyroidism   • Vitamin D deficiency   • Dyslipidemia   • Hyperuricemia   • Symptomatic menopausal or female climacteric states   • Renal insufficiency  "      Advance Care Planning:  has an advance directive - a copy HAS NOT been provided. Have asked the patient to send this to us to add to record.    Identification of Risk Factors:  Risk factors include: weight  and inactivity.    Review of Systems    Compared to one year ago, the patient feels her physical health is the same.  Compared to one year ago, the patient feels her mental health is the same.    Objective     Physical Exam    Vitals:    09/27/18 1011   BP: 120/80   BP Location: Left arm   Patient Position: Sitting   Cuff Size: Large Adult   Pulse: 92   Resp: 16   Temp: 97.9 °F (36.6 °C)   TempSrc: Oral   SpO2: 97%   Weight: 83.9 kg (185 lb)   Height: 166.4 cm (65.5\")   PainSc: 0-No pain       Patient's Body mass index is 30.32 kg/m². BMI is above normal parameters. Recommendations include: educational material.      Assessment/Plan   Patient Self-Management and Personalized Health Advice  The patient has been provided with information about: exercise and weight management and preventive services including:   · shingrix.    Visit Diagnoses:    ICD-10-CM ICD-9-CM   1. Immunization due Z23 V05.9       Orders Placed This Encounter   Procedures   • Fluzone High Dose =>65Years       Outpatient Encounter Prescriptions as of 9/27/2018   Medication Sig Dispense Refill   • allopurinol (ZYLOPRIM) 100 MG tablet Take 1 tablet by mouth Daily. 30 tablet 5   • aspirin 81 MG EC tablet Take 81 mg by mouth daily.     • atorvastatin (LIPITOR) 10 MG tablet Take 1 tablet by mouth Daily. For cholesterol 90 tablet 3   • Calcium Carbonate-Vitamin D (CALCIUM-VITAMIN D) 600-200 MG-UNIT capsule Take by mouth.     • cetirizine (ZyrTEC) 10 MG tablet Take 10 mg by mouth daily. Take one tablet daily as needed     • cholecalciferol (VITAMIN D3) 1000 UNITS tablet Take 2,000 Units by mouth Daily.     • estradiol (ESTRACE) 1 MG tablet Take 1 tablet by mouth Daily. 30 tablet 5   • melatonin tablet Take by mouth.     • metoprolol succinate XL " (TOPROL-XL) 50 MG 24 hr tablet Take 1 tablet by mouth Daily. For BP and palpitations 90 tablet 3   • Multiple Vitamin (MULTIVITAMIN) tablet Take 1 tablet by mouth daily.     • progesterone (PROMETRIUM) 200 MG capsule Take 200 mg by mouth Daily.     • SYNTHROID 125 MCG tablet Take 1 tablet by mouth Daily. On empty stomach 30 tablet 5   • triamcinolone (KENALOG) 0.5 % cream Apply  topically 3 (Three) Times a Day. To affected area for contact dermatitis 30 g 0   • triamterene-hydrochlorothiazide (DYAZIDE) 37.5-25 MG per capsule Take 1 capsule by mouth Every Morning. For BP and edema 90 capsule 3   • [DISCONTINUED] predniSONE (DELTASONE) 20 MG tablet 20mg tabs, 3 for 2 days, 2 for 2 days, 1 for 2 days 12 tablet 0   • [DISCONTINUED] progesterone (PROMETRIUM) 200 MG capsule Take 1 capsule by mouth Daily. 30 capsule 5     No facility-administered encounter medications on file as of 9/27/2018.        Reviewed use of high risk medication in the elderly: yes  Reviewed for potential of harmful drug interactions in the elderly: yes    Follow Up:  No Follow-up on file.     An After Visit Summary and PPPS with all of these plans were given to the patient.

## 2018-09-27 NOTE — PATIENT INSTRUCTIONS
Fall Prevention in the Home  Falls can cause injuries and can affect people from all age groups. There are many simple things that you can do to make your home safe and to help prevent falls.  What can I do on the outside of my home?  · Regularly repair the edges of walkways and driveways and fix any cracks.  · Remove high doorway thresholds.  · Trim any shrubbery on the main path into your home.  · Use bright outdoor lighting.  · Clear walkways of debris and clutter, including tools and rocks.  · Regularly check that handrails are securely fastened and in good repair. Both sides of any steps should have handrails.  · Install guardrails along the edges of any raised decks or porches.  · Have leaves, snow, and ice cleared regularly.  · Use sand or salt on walkways during winter months.  · In the garage, clean up any spills right away, including grease or oil spills.  What can I do in the bathroom?  · Use night lights.  · Install grab bars by the toilet and in the tub and shower. Do not use towel bars as grab bars.  · Use non-skid mats or decals on the floor of the tub or shower.  · If you need to sit down while you are in the shower, use a plastic, non-slip stool.  · Keep the floor dry. Immediately clean up any water that spills on the floor.  · Remove soap buildup in the tub or shower on a regular basis.  · Attach bath mats securely with double-sided non-slip rug tape.  · Remove throw rugs and other tripping hazards from the floor.  What can I do in the bedroom?  · Use night lights.  · Make sure that a bedside light is easy to reach.  · Do not use oversized bedding that drapes onto the floor.  · Have a firm chair that has side arms to use for getting dressed.  · Remove throw rugs and other tripping hazards from the floor.  What can I do in the kitchen?  · Clean up any spills right away.  · Avoid walking on wet floors.  · Place frequently used items in easy-to-reach places.  · If you need to reach for something  above you, use a sturdy step stool that has a grab bar.  · Keep electrical cables out of the way.  · Do not use floor polish or wax that makes floors slippery. If you have to use wax, make sure that it is non-skid floor wax.  · Remove throw rugs and other tripping hazards from the floor.  What can I do in the stairways?  · Do not leave any items on the stairs.  · Make sure that there are handrails on both sides of the stairs. Fix handrails that are broken or loose. Make sure that handrails are as long as the stairways.  · Check any carpeting to make sure that it is firmly attached to the stairs. Fix any carpet that is loose or worn.  · Avoid having throw rugs at the top or bottom of stairways, or secure the rugs with carpet tape to prevent them from moving.  · Make sure that you have a light switch at the top of the stairs and the bottom of the stairs. If you do not have them, have them installed.  What are some other fall prevention tips?  · Wear closed-toe shoes that fit well and support your feet. Wear shoes that have rubber soles or low heels.  · When you use a stepladder, make sure that it is completely opened and that the sides are firmly locked. Have someone hold the ladder while you are using it. Do not climb a closed stepladder.  · Add color or contrast paint or tape to grab bars and handrails in your home. Place contrasting color strips on the first and last steps.  · Use mobility aids as needed, such as canes, walkers, scooters, and crutches.  · Turn on lights if it is dark. Replace any light bulbs that burn out.  · Set up furniture so that there are clear paths. Keep the furniture in the same spot.  · Fix any uneven floor surfaces.  · Choose a carpet design that does not hide the edge of steps of a stairway.  · Be aware of any and all pets.  · Review your medicines with your healthcare provider. Some medicines can cause dizziness or changes in blood pressure, which increase your risk of falling.  Talk  with your health care provider about other ways that you can decrease your risk of falls. This may include working with a physical therapist or  to improve your strength, balance, and endurance.  This information is not intended to replace advice given to you by your health care provider. Make sure you discuss any questions you have with your health care provider.  Document Released: 12/08/2003 Document Revised: 05/16/2017 Document Reviewed: 01/22/2016  [employer] Interactive Patient Education © 2018 [employer]  Low glycemic index diet  Exercise 30 minutes most days of the week  Make sure you get results on any labs or tests we ordered today  We discussed medications and how to take them as prescribed  Sleep 6-8 hours each night if possible  If you have not signed up for [employer], please activate your code ASAP from your After Visit Summary today    LDL goal <100  LDL goal if heart disease <70  HDL goal >60  Triglyceride goal <150  BP goal =<130/80  Fasting glucose <100

## 2018-09-28 RX ORDER — LEVOTHYROXINE SODIUM 125 MCG
TABLET ORAL
Qty: 30 TABLET | Refills: 4 | Status: SHIPPED | OUTPATIENT
Start: 2018-09-28 | End: 2018-11-07 | Stop reason: SDUPTHER

## 2018-09-28 RX ORDER — ALLOPURINOL 100 MG/1
TABLET ORAL
Qty: 30 TABLET | Refills: 4 | Status: SHIPPED | OUTPATIENT
Start: 2018-09-28 | End: 2018-11-07 | Stop reason: SDUPTHER

## 2018-10-15 DIAGNOSIS — E78.5 DYSLIPIDEMIA: ICD-10-CM

## 2018-10-15 DIAGNOSIS — E55.9 VITAMIN D DEFICIENCY: ICD-10-CM

## 2018-10-15 DIAGNOSIS — E79.0 HYPERURICEMIA: Primary | ICD-10-CM

## 2018-10-15 DIAGNOSIS — R94.6 THYROID FUNCTION TEST ABNORMAL: ICD-10-CM

## 2018-10-15 DIAGNOSIS — E03.9 PRIMARY HYPOTHYROIDISM: ICD-10-CM

## 2018-11-07 ENCOUNTER — OFFICE VISIT (OUTPATIENT)
Dept: ENDOCRINOLOGY | Age: 68
End: 2018-11-07

## 2018-11-07 VITALS
DIASTOLIC BLOOD PRESSURE: 78 MMHG | BODY MASS INDEX: 29.77 KG/M2 | HEIGHT: 66 IN | RESPIRATION RATE: 16 BRPM | SYSTOLIC BLOOD PRESSURE: 122 MMHG | WEIGHT: 185.2 LBS

## 2018-11-07 DIAGNOSIS — E78.5 DYSLIPIDEMIA: ICD-10-CM

## 2018-11-07 DIAGNOSIS — E03.9 PRIMARY HYPOTHYROIDISM: Primary | ICD-10-CM

## 2018-11-07 DIAGNOSIS — E55.9 VITAMIN D DEFICIENCY: ICD-10-CM

## 2018-11-07 DIAGNOSIS — E79.0 HYPERURICEMIA: ICD-10-CM

## 2018-11-07 DIAGNOSIS — I10 BENIGN ESSENTIAL HYPERTENSION: ICD-10-CM

## 2018-11-07 PROCEDURE — 99214 OFFICE O/P EST MOD 30 MIN: CPT | Performed by: INTERNAL MEDICINE

## 2018-11-07 RX ORDER — ATORVASTATIN CALCIUM 10 MG/1
10 TABLET, FILM COATED ORAL DAILY
Qty: 90 TABLET | Refills: 3 | Status: SHIPPED | OUTPATIENT
Start: 2018-11-07 | End: 2019-06-13 | Stop reason: SDUPTHER

## 2018-11-07 RX ORDER — ESTRADIOL 1 MG/1
1 TABLET ORAL DAILY
Qty: 90 TABLET | Refills: 3 | Status: SHIPPED | OUTPATIENT
Start: 2018-11-07 | End: 2019-06-13 | Stop reason: SDUPTHER

## 2018-11-07 RX ORDER — ALLOPURINOL 100 MG/1
100 TABLET ORAL DAILY
Qty: 90 TABLET | Refills: 3 | Status: SHIPPED | OUTPATIENT
Start: 2018-11-07 | End: 2019-06-13 | Stop reason: SDUPTHER

## 2018-11-07 RX ORDER — LEVOTHYROXINE SODIUM 125 MCG
125 TABLET ORAL DAILY
Qty: 90 TABLET | Refills: 1 | Status: SHIPPED | OUTPATIENT
Start: 2018-11-07 | End: 2019-06-13 | Stop reason: SDUPTHER

## 2018-11-07 NOTE — PROGRESS NOTES
"Subjective   Onelia Alegre is a 67 y.o. female seen for follow up for hypothyroidism, vit d deficiency, lab review. patient denies any problems or concerns.     History of Present Illness this is a 67-year-old female known patient with hypothyroidism as well as hypertension and dyslipidemia with menopausal symptoms and hyperuricemia and vitamin D deficiency.  Over the course of last 6 months she has had no significant health problem for which to go to the emergency room or hospital.  /78   Resp 16   Ht 167.6 cm (66\")   Wt 84 kg (185 lb 3.2 oz)   LMP  (LMP Unknown)   BMI 29.89 kg/m²   Allergies   Allergen Reactions   • Actonel [Risedronate Sodium] Other (See Comments)     Other reaction(s): ARTHRALGIAS   • Penicillins Other (See Comments) and Rash     Other reaction(s): JOINT PAIN   • Sulfa Antibiotics Rash       Current Outpatient Prescriptions:   •  allopurinol (ZYLOPRIM) 100 MG tablet, TAKE ONE TABLET BY MOUTH DAILY, Disp: 30 tablet, Rfl: 4  •  aspirin 81 MG EC tablet, Take 81 mg by mouth daily., Disp: , Rfl:   •  atorvastatin (LIPITOR) 10 MG tablet, Take 1 tablet by mouth Daily. For cholesterol, Disp: 90 tablet, Rfl: 3  •  Calcium Carbonate-Vitamin D (CALCIUM-VITAMIN D) 600-200 MG-UNIT capsule, Take by mouth., Disp: , Rfl:   •  cetirizine (ZyrTEC) 10 MG tablet, Take 10 mg by mouth daily. Take one tablet daily as needed, Disp: , Rfl:   •  cholecalciferol (VITAMIN D3) 1000 UNITS tablet, Take 2,000 Units by mouth Daily., Disp: , Rfl:   •  clotrimazole (LOTRIMIN AF) 1 % cream, Apply  topically to the appropriate area as directed Every 12 (Twelve) Hours. To rash right foot until 2 weeks past clear, Disp: 60 g, Rfl: 1  •  estradiol (ESTRACE) 1 MG tablet, Take 1 tablet by mouth Daily., Disp: 30 tablet, Rfl: 5  •  melatonin tablet, Take by mouth., Disp: , Rfl:   •  metoprolol succinate XL (TOPROL-XL) 50 MG 24 hr tablet, Take 1 tablet by mouth Daily. For BP and palpitations, Disp: 90 tablet, Rfl: 3  •  " Multiple Vitamin (MULTIVITAMIN) tablet, Take 1 tablet by mouth daily., Disp: , Rfl:   •  progesterone (PROMETRIUM) 200 MG capsule, Take 200 mg by mouth Daily., Disp: , Rfl:   •  SYNTHROID 125 MCG tablet, TAKE ONE TABLET BY MOUTH DAILY ON EMPTY STOMACH, Disp: 30 tablet, Rfl: 4  •  triamterene-hydrochlorothiazide (DYAZIDE) 37.5-25 MG per capsule, Take 1 capsule by mouth Every Morning. For BP and edema, Disp: 90 capsule, Rfl: 3      The following portions of the patient's history were reviewed and updated as appropriate: allergies, current medications, past family history, past medical history, past social history, past surgical history and problem list.    Review of Systems   Constitutional: Negative.    HENT: Negative.    Eyes: Negative.    Respiratory: Negative.    Cardiovascular: Negative.    Gastrointestinal: Negative.    Endocrine: Negative.    Genitourinary: Negative.    Musculoskeletal: Negative.    Skin: Negative.    Allergic/Immunologic: Negative.    Neurological: Negative.    Hematological: Negative.    Psychiatric/Behavioral: Negative.        Objective   Physical Exam   Constitutional: She is oriented to person, place, and time. She appears well-developed and well-nourished. No distress.   HENT:   Head: Normocephalic and atraumatic.   Right Ear: External ear normal.   Left Ear: External ear normal.   Nose: Nose normal.   Mouth/Throat: Oropharynx is clear and moist. No oropharyngeal exudate.   Eyes: Pupils are equal, round, and reactive to light. Conjunctivae and EOM are normal. Right eye exhibits no discharge. Left eye exhibits no discharge. No scleral icterus.   Neck: Normal range of motion. Neck supple. No JVD present. No tracheal deviation present. Thyromegaly present.   Very find the nodular goiter.  It most freely with swallowing and is not associated with tenderness or lymphadenopathy.   Cardiovascular: Normal rate, regular rhythm, normal heart sounds and intact distal pulses.  Exam reveals no gallop  and no friction rub.    No murmur heard.  Pulmonary/Chest: Effort normal and breath sounds normal. No stridor. No respiratory distress. She has no wheezes. She has no rales. She exhibits no tenderness.   Abdominal: Soft. Bowel sounds are normal. She exhibits no distension and no mass. There is no tenderness. There is no rebound and no guarding. No hernia.   Musculoskeletal: Normal range of motion. She exhibits no edema, tenderness or deformity.   Lymphadenopathy:     She has no cervical adenopathy.   Neurological: She is alert and oriented to person, place, and time. She has normal reflexes. She displays normal reflexes. No cranial nerve deficit or sensory deficit. She exhibits normal muscle tone. Coordination normal.   Skin: Skin is warm and dry. No rash noted. She is not diaphoretic. No erythema. No pallor.   Psychiatric: She has a normal mood and affect. Her behavior is normal. Judgment and thought content normal.   Nursing note and vitals reviewed.       Results for orders placed or performed in visit on 09/18/18   T3, Free   Result Value Ref Range    T3, Free 3.0 2.0 - 4.4 pg/mL   T4 & TSH ([facility])   Result Value Ref Range    TSH 1.120 0.270 - 4.200 mIU/mL    T4, Total 12.09 (H) 4.50 - 11.70 mcg/dL   T4, Free   Result Value Ref Range    Free T4 2.18 (H) 0.93 - 1.70 ng/dL   Uric Acid   Result Value Ref Range    Uric Acid 4.2 2.4 - 5.7 mg/dL   Vitamin D 25 Hydroxy   Result Value Ref Range    25 Hydroxy, Vitamin D 91.5 30.0 - 100.0 ng/ml   Comprehensive Metabolic Panel   Result Value Ref Range    Glucose 89 65 - 99 mg/dL    BUN 17 8 - 23 mg/dL    Creatinine 0.93 0.57 - 1.00 mg/dL    eGFR Non African Am 60 (L) >60 mL/min/1.73    eGFR African Am 72 >60 mL/min/1.73    BUN/Creatinine Ratio 18.3 7.0 - 25.0    Sodium 138 136 - 145 mmol/L    Potassium 4.5 3.5 - 5.2 mmol/L    Chloride 100 98 - 107 mmol/L    Total CO2 24.6 22.0 - 29.0 mmol/L    Calcium 10.0 8.6 - 10.5 mg/dL    Total Protein 6.6 6.0 - 8.5 g/dL    Albumin  4.30 3.50 - 5.20 g/dL    Globulin 2.3 gm/dL    A/G Ratio 1.9 g/dL    Total Bilirubin 0.9 0.1 - 1.2 mg/dL    Alkaline Phosphatase 81 39 - 117 U/L    AST (SGOT) 18 1 - 32 U/L    ALT (SGPT) 16 1 - 33 U/L   C-Peptide   Result Value Ref Range    C-Peptide 4.2 1.1 - 4.4 ng/mL   Hemoglobin A1c   Result Value Ref Range    Hemoglobin A1C 5.40 4.80 - 5.60 %   Lipid Panel   Result Value Ref Range    Total Cholesterol 135 0 - 200 mg/dL    Triglycerides 112 0 - 150 mg/dL    HDL Cholesterol 62 (H) 40 - 60 mg/dL    VLDL Cholesterol 22.4 5 - 40 mg/dL    LDL Cholesterol  51 0 - 100 mg/dL   Thyroglobulin With Anti-TG   Result Value Ref Range    Thyroglobulin Ab <1.0 0.0 - 0.9 IU/mL   Thyroglobulin By EMILIANO   Result Value Ref Range    THYROGLOBULIN BY EMLIIANO 12.4 1.5 - 38.5 ng/mL         Assessment/Plan   Diagnoses and all orders for this visit:    Primary hypothyroidism  -     T3, Free; Future  -     T4 & TSH ([facility]); Future  -     T4, Free; Future  -     Thyroglobulin With Anti-TG; Future  -     Uric Acid; Future  -     Vitamin D 25 Hydroxy; Future  -     Comprehensive Metabolic Panel; Future  -     C-Peptide; Future  -     Hemoglobin A1c; Future  -     Lipid Panel; Future    Benign essential hypertension  -     T3, Free; Future  -     T4 & TSH ([facility]); Future  -     T4, Free; Future  -     Thyroglobulin With Anti-TG; Future  -     Uric Acid; Future  -     Vitamin D 25 Hydroxy; Future  -     Comprehensive Metabolic Panel; Future  -     C-Peptide; Future  -     Hemoglobin A1c; Future  -     Lipid Panel; Future    Vitamin D deficiency  -     T3, Free; Future  -     T4 & TSH ([facility]); Future  -     T4, Free; Future  -     Thyroglobulin With Anti-TG; Future  -     Uric Acid; Future  -     Vitamin D 25 Hydroxy; Future  -     Comprehensive Metabolic Panel; Future  -     C-Peptide; Future  -     Hemoglobin A1c; Future  -     Lipid Panel; Future    Dyslipidemia  -     T3, Free; Future  -     T4 & TSH ([facility]); Future  -     T4, Free;  Future  -     Thyroglobulin With Anti-TG; Future  -     Uric Acid; Future  -     Vitamin D 25 Hydroxy; Future  -     Comprehensive Metabolic Panel; Future  -     C-Peptide; Future  -     Hemoglobin A1c; Future  -     Lipid Panel; Future    Hyperuricemia  -     T3, Free; Future  -     T4 & TSH ([facility]); Future  -     T4, Free; Future  -     Thyroglobulin With Anti-TG; Future  -     Uric Acid; Future  -     Vitamin D 25 Hydroxy; Future  -     Comprehensive Metabolic Panel; Future  -     C-Peptide; Future  -     Hemoglobin A1c; Future  -     Lipid Panel; Future    Other orders  -     SYNTHROID 125 MCG tablet; Take 1 tablet by mouth Daily.  -     progesterone (PROMETRIUM) 200 MG capsule; Take 1 capsule by mouth Daily.  -     estradiol (ESTRACE) 1 MG tablet; Take 1 tablet by mouth Daily.  -     allopurinol (ZYLOPRIM) 100 MG tablet; Take 1 tablet by mouth Daily.  -     atorvastatin (LIPITOR) 10 MG tablet; Take 1 tablet by mouth Daily. For cholesterol               In summary I saw and examined this 67-year-old female for above-mentioned problems.  I reviewed her laboratory evaluation of October 18 and provided her with a hard copy of it.  Overall she is clinically and metabolically stable and therefore we will go ahead and continue all her current prescriptions.  I will see her in 6 months or sooner if needed with laboratory evaluation prior to each office visit.

## 2018-11-15 ENCOUNTER — RESULTS ENCOUNTER (OUTPATIENT)
Dept: ENDOCRINOLOGY | Age: 68
End: 2018-11-15

## 2018-11-15 DIAGNOSIS — R94.6 THYROID FUNCTION TEST ABNORMAL: ICD-10-CM

## 2018-11-15 DIAGNOSIS — E78.5 DYSLIPIDEMIA: ICD-10-CM

## 2018-11-15 DIAGNOSIS — E55.9 VITAMIN D DEFICIENCY: ICD-10-CM

## 2018-11-15 DIAGNOSIS — E79.0 HYPERURICEMIA: ICD-10-CM

## 2018-11-15 DIAGNOSIS — E03.9 PRIMARY HYPOTHYROIDISM: ICD-10-CM

## 2019-01-07 ENCOUNTER — APPOINTMENT (OUTPATIENT)
Dept: WOMENS IMAGING | Facility: HOSPITAL | Age: 69
End: 2019-01-07

## 2019-01-07 PROCEDURE — 77067 SCR MAMMO BI INCL CAD: CPT | Performed by: RADIOLOGY

## 2019-01-07 PROCEDURE — 77063 BREAST TOMOSYNTHESIS BI: CPT | Performed by: RADIOLOGY

## 2019-03-28 ENCOUNTER — OFFICE VISIT (OUTPATIENT)
Dept: FAMILY MEDICINE CLINIC | Facility: CLINIC | Age: 69
End: 2019-03-28

## 2019-03-28 VITALS
HEART RATE: 64 BPM | TEMPERATURE: 97.5 F | SYSTOLIC BLOOD PRESSURE: 120 MMHG | HEIGHT: 66 IN | OXYGEN SATURATION: 97 % | RESPIRATION RATE: 16 BRPM | DIASTOLIC BLOOD PRESSURE: 80 MMHG | BODY MASS INDEX: 29.73 KG/M2 | WEIGHT: 185 LBS

## 2019-03-28 DIAGNOSIS — E78.5 DYSLIPIDEMIA: ICD-10-CM

## 2019-03-28 DIAGNOSIS — R00.2 PALPITATION: ICD-10-CM

## 2019-03-28 DIAGNOSIS — E03.9 PRIMARY HYPOTHYROIDISM: ICD-10-CM

## 2019-03-28 DIAGNOSIS — R60.0 LOCALIZED EDEMA: ICD-10-CM

## 2019-03-28 DIAGNOSIS — I10 BENIGN ESSENTIAL HYPERTENSION: Primary | ICD-10-CM

## 2019-03-28 PROCEDURE — 99213 OFFICE O/P EST LOW 20 MIN: CPT | Performed by: PHYSICIAN ASSISTANT

## 2019-03-28 RX ORDER — TRIAMTERENE AND HYDROCHLOROTHIAZIDE 37.5; 25 MG/1; MG/1
1 CAPSULE ORAL EVERY MORNING
Qty: 90 CAPSULE | Refills: 3 | Status: SHIPPED | OUTPATIENT
Start: 2019-03-28 | End: 2020-04-30 | Stop reason: SDUPTHER

## 2019-03-28 RX ORDER — METOPROLOL SUCCINATE 50 MG/1
50 TABLET, EXTENDED RELEASE ORAL DAILY
Qty: 90 TABLET | Refills: 3 | Status: SHIPPED | OUTPATIENT
Start: 2019-03-28 | End: 2019-06-13 | Stop reason: SDUPTHER

## 2019-03-28 NOTE — PROGRESS NOTES
Subjective   Onelia Alegre is a 68 y.o. female.     History of Present Illness   Intol to Actonel in past?  achy  She is on bp med for HTN and beta bl for palpitations  Dyazide for bp and edema  No recent gout  Onelia Alegre 68 y.o. female who presents today for routine follow up check and medication refills.  she has a history of   Patient Active Problem List   Diagnosis   • Benign essential hypertension   • Osteopenia   • Excess weight   • Localized edema   • Thyroid function test abnormal   • Primary hypothyroidism   • Vitamin D deficiency   • Dyslipidemia   • Hyperuricemia   • Symptomatic menopausal or female climacteric states   • Renal insufficiency   • Palpitation   .  Since the last visit, she has overall felt well.  She has Hypertenision and is well controlled on medication, Hyperlipidemia and is well controlled on medication, Hypothyroidism and under the care of Endocrinologist and Vitamin D deficiency and well controlled on medication and labs at goal >30.  she has been compliant with current medications have reviewed them.  The patient denies medication side effects.    Results for orders placed or performed in visit on 09/18/18   T3, Free   Result Value Ref Range    T3, Free 3.0 2.0 - 4.4 pg/mL   T4 & TSH ([facility])   Result Value Ref Range    TSH 1.120 0.270 - 4.200 mIU/mL    T4, Total 12.09 (H) 4.50 - 11.70 mcg/dL   T4, Free   Result Value Ref Range    Free T4 2.18 (H) 0.93 - 1.70 ng/dL   Uric Acid   Result Value Ref Range    Uric Acid 4.2 2.4 - 5.7 mg/dL   Vitamin D 25 Hydroxy   Result Value Ref Range    25 Hydroxy, Vitamin D 91.5 30.0 - 100.0 ng/ml   Comprehensive Metabolic Panel   Result Value Ref Range    Glucose 89 65 - 99 mg/dL    BUN 17 8 - 23 mg/dL    Creatinine 0.93 0.57 - 1.00 mg/dL    eGFR Non African Am 60 (L) >60 mL/min/1.73    eGFR African Am 72 >60 mL/min/1.73    BUN/Creatinine Ratio 18.3 7.0 - 25.0    Sodium 138 136 - 145 mmol/L    Potassium 4.5 3.5 - 5.2 mmol/L    Chloride  100 98 - 107 mmol/L    Total CO2 24.6 22.0 - 29.0 mmol/L    Calcium 10.0 8.6 - 10.5 mg/dL    Total Protein 6.6 6.0 - 8.5 g/dL    Albumin 4.30 3.50 - 5.20 g/dL    Globulin 2.3 gm/dL    A/G Ratio 1.9 g/dL    Total Bilirubin 0.9 0.1 - 1.2 mg/dL    Alkaline Phosphatase 81 39 - 117 U/L    AST (SGOT) 18 1 - 32 U/L    ALT (SGPT) 16 1 - 33 U/L   C-Peptide   Result Value Ref Range    C-Peptide 4.2 1.1 - 4.4 ng/mL   Hemoglobin A1c   Result Value Ref Range    Hemoglobin A1C 5.40 4.80 - 5.60 %   Lipid Panel   Result Value Ref Range    Total Cholesterol 135 0 - 200 mg/dL    Triglycerides 112 0 - 150 mg/dL    HDL Cholesterol 62 (H) 40 - 60 mg/dL    VLDL Cholesterol 22.4 5 - 40 mg/dL    LDL Cholesterol  51 0 - 100 mg/dL   Thyroglobulin With Anti-TG   Result Value Ref Range    Thyroglobulin Ab <1.0 0.0 - 0.9 IU/mL   Thyroglobulin By EMILIANO   Result Value Ref Range    THYROGLOBULIN BY EMILIANO 12.4 1.5 - 38.5 ng/mL       Get CBC    The following portions of the patient's history were reviewed and updated as appropriate: allergies, current medications, past family history, past medical history, past social history, past surgical history and problem list.    Review of Systems   Constitutional: Negative for activity change, appetite change and unexpected weight change.   HENT: Negative for nosebleeds and trouble swallowing.    Eyes: Negative for pain and visual disturbance.   Respiratory: Negative for chest tightness, shortness of breath and wheezing.    Cardiovascular: Negative for chest pain and palpitations.   Gastrointestinal: Negative for abdominal pain and blood in stool.   Endocrine: Negative.    Genitourinary: Negative for difficulty urinating and hematuria.   Musculoskeletal: Negative for joint swelling.   Skin: Negative for color change and rash.   Allergic/Immunologic: Negative.    Neurological: Negative for syncope and speech difficulty.   Hematological: Negative for adenopathy.   Psychiatric/Behavioral: Negative for agitation and  confusion.   All other systems reviewed and are negative.      Objective   Physical Exam   Constitutional: She is oriented to person, place, and time. She appears well-developed and well-nourished. No distress.   HENT:   Head: Normocephalic and atraumatic.   Eyes: Conjunctivae and EOM are normal. Pupils are equal, round, and reactive to light. Right eye exhibits no discharge. Left eye exhibits no discharge. No scleral icterus.   Neck: Normal range of motion. Neck supple. No tracheal deviation present.   Cardiovascular: Normal rate, regular rhythm, normal heart sounds, intact distal pulses and normal pulses. Exam reveals no gallop.   No murmur heard.  No edema now   Pulmonary/Chest: Effort normal and breath sounds normal. No respiratory distress. She has no wheezes. She has no rales.   Musculoskeletal: Normal range of motion.   Lymphadenopathy:     She has no cervical adenopathy.   Neurological: She is alert and oriented to person, place, and time. She exhibits normal muscle tone. Coordination normal.   Skin: Skin is warm. No rash noted. No erythema. No pallor.   Psychiatric: She has a normal mood and affect. Her behavior is normal. Judgment and thought content normal.   Nursing note and vitals reviewed.      Assessment/Plan   Onelia was seen today for hypertension.    Diagnoses and all orders for this visit:    Benign essential hypertension    Dyslipidemia    Primary hypothyroidism    Palpitation    Localized edema    Other orders  -     triamterene-hydrochlorothiazide (DYAZIDE) 37.5-25 MG per capsule; Take 1 capsule by mouth Every Morning. For BP and edema  -     metoprolol succinate XL (TOPROL-XL) 50 MG 24 hr tablet; Take 1 tablet by mouth Daily. For BP and palpitations    In summary, Onelia Alegre, was seen today.  she was seen for  Hypertenision and is well controlled on medication, Hyperlipidemia and is well controlled on medication, Hypothyroidism and under the care of Endocrinologist and Vitamin D  deficiency and well controlled on medication and labs at goal >30,    No recent gout  Stop ASA  On HRT  Get CBC, B12, folate

## 2019-03-28 NOTE — PATIENT INSTRUCTIONS
Low glycemic index diet  Exercise 30 minutes most days of the week  Make sure you get results on any labs or tests we ordered today  We discussed medications and how to take them as prescribed  Sleep 6-8 hours each night if possible  If you have not signed up for [employer], please activate your code ASAP from your After Visit Summary today    LDL goal <100  LDL goal if heart disease <70  HDL goal >60  Triglyceride goal <150  BP goal =<130/80  Fasting glucose <100

## 2019-03-29 DIAGNOSIS — R79.89 ABNORMAL CBC: Primary | ICD-10-CM

## 2019-03-29 LAB
BASOPHILS # BLD AUTO: 0.07 10*3/MM3 (ref 0–0.2)
BASOPHILS NFR BLD AUTO: 0.6 % (ref 0–1.5)
EOSINOPHIL # BLD AUTO: 0.12 10*3/MM3 (ref 0–0.4)
EOSINOPHIL NFR BLD AUTO: 0.9 % (ref 0.3–6.2)
ERYTHROCYTE [DISTWIDTH] IN BLOOD BY AUTOMATED COUNT: 13.2 % (ref 12.3–15.4)
FOLATE SERPL-MCNC: >20 NG/ML (ref 4.78–24.2)
HCT VFR BLD AUTO: 46.8 % (ref 34–46.6)
HGB BLD-MCNC: 15.3 G/DL (ref 12–15.9)
IMM GRANULOCYTES # BLD AUTO: 0.05 10*3/MM3 (ref 0–0.05)
IMM GRANULOCYTES NFR BLD AUTO: 0.4 % (ref 0–0.5)
LYMPHOCYTES # BLD AUTO: 2.73 10*3/MM3 (ref 0.7–3.1)
LYMPHOCYTES NFR BLD AUTO: 21.6 % (ref 19.6–45.3)
MCH RBC QN AUTO: 33.3 PG (ref 26.6–33)
MCHC RBC AUTO-ENTMCNC: 32.7 G/DL (ref 31.5–35.7)
MCV RBC AUTO: 101.7 FL (ref 79–97)
MONOCYTES # BLD AUTO: 0.84 10*3/MM3 (ref 0.1–0.9)
MONOCYTES NFR BLD AUTO: 6.6 % (ref 5–12)
NEUTROPHILS # BLD AUTO: 8.84 10*3/MM3 (ref 1.4–7)
NEUTROPHILS NFR BLD AUTO: 69.9 % (ref 42.7–76)
NRBC BLD AUTO-RTO: 0 /100 WBC (ref 0–0)
PLATELET # BLD AUTO: 272 10*3/MM3 (ref 140–450)
RBC # BLD AUTO: 4.6 10*6/MM3 (ref 3.77–5.28)
VIT B12 SERPL-MCNC: 506 PG/ML (ref 211–946)
WBC # BLD AUTO: 12.65 10*3/MM3 (ref 3.4–10.8)

## 2019-04-29 ENCOUNTER — RESULTS ENCOUNTER (OUTPATIENT)
Dept: ENDOCRINOLOGY | Age: 69
End: 2019-04-29

## 2019-04-29 DIAGNOSIS — E78.5 DYSLIPIDEMIA: ICD-10-CM

## 2019-04-29 DIAGNOSIS — E03.9 PRIMARY HYPOTHYROIDISM: ICD-10-CM

## 2019-04-29 DIAGNOSIS — E79.0 HYPERURICEMIA: ICD-10-CM

## 2019-04-29 DIAGNOSIS — I10 BENIGN ESSENTIAL HYPERTENSION: ICD-10-CM

## 2019-04-29 DIAGNOSIS — E55.9 VITAMIN D DEFICIENCY: ICD-10-CM

## 2019-05-02 LAB
BASOPHILS # BLD AUTO: 0.09 10*3/MM3 (ref 0–0.2)
BASOPHILS NFR BLD AUTO: 1 % (ref 0–1.5)
EOSINOPHIL # BLD AUTO: 0.35 10*3/MM3 (ref 0–0.4)
EOSINOPHIL NFR BLD AUTO: 3.7 % (ref 0.3–6.2)
ERYTHROCYTE [DISTWIDTH] IN BLOOD BY AUTOMATED COUNT: 13.2 % (ref 12.3–15.4)
HCT VFR BLD AUTO: 44.3 % (ref 34–46.6)
HGB BLD-MCNC: 14.4 G/DL (ref 12–15.9)
IMM GRANULOCYTES # BLD AUTO: 0.03 10*3/MM3 (ref 0–0.05)
IMM GRANULOCYTES NFR BLD AUTO: 0.3 % (ref 0–0.5)
LYMPHOCYTES # BLD AUTO: 2.59 10*3/MM3 (ref 0.7–3.1)
LYMPHOCYTES NFR BLD AUTO: 27.5 % (ref 19.6–45.3)
MCH RBC QN AUTO: 33 PG (ref 26.6–33)
MCHC RBC AUTO-ENTMCNC: 32.5 G/DL (ref 31.5–35.7)
MCV RBC AUTO: 101.6 FL (ref 79–97)
MONOCYTES # BLD AUTO: 0.55 10*3/MM3 (ref 0.1–0.9)
MONOCYTES NFR BLD AUTO: 5.8 % (ref 5–12)
NEUTROPHILS # BLD AUTO: 5.8 10*3/MM3 (ref 1.7–7)
NEUTROPHILS NFR BLD AUTO: 61.7 % (ref 42.7–76)
NRBC BLD AUTO-RTO: 0 /100 WBC (ref 0–0.2)
PLATELET # BLD AUTO: 271 10*3/MM3 (ref 140–450)
RBC # BLD AUTO: 4.36 10*6/MM3 (ref 3.77–5.28)
WBC # BLD AUTO: 9.41 10*3/MM3 (ref 3.4–10.8)

## 2019-05-31 LAB
25(OH)D3+25(OH)D2 SERPL-MCNC: 78 NG/ML (ref 30–100)
ALBUMIN SERPL-MCNC: 4 G/DL (ref 3.5–5.2)
ALBUMIN/GLOB SERPL: 1.6 G/DL
ALP SERPL-CCNC: 73 U/L (ref 39–117)
ALT SERPL-CCNC: 14 U/L (ref 1–33)
AST SERPL-CCNC: 14 U/L (ref 1–32)
BILIRUB SERPL-MCNC: 0.9 MG/DL (ref 0.2–1.2)
BUN SERPL-MCNC: 13 MG/DL (ref 8–23)
BUN/CREAT SERPL: 13.8 (ref 7–25)
C PEPTIDE SERPL-MCNC: 3.7 NG/ML (ref 1.1–4.4)
CALCIUM SERPL-MCNC: 10.2 MG/DL (ref 8.6–10.5)
CHLORIDE SERPL-SCNC: 101 MMOL/L (ref 98–107)
CHOLEST SERPL-MCNC: 125 MG/DL (ref 0–200)
CO2 SERPL-SCNC: 27.6 MMOL/L (ref 22–29)
CREAT SERPL-MCNC: 0.94 MG/DL (ref 0.57–1)
GLOBULIN SER CALC-MCNC: 2.5 GM/DL
GLUCOSE SERPL-MCNC: 85 MG/DL (ref 65–99)
HBA1C MFR BLD: 5.3 % (ref 4.8–5.6)
HDLC SERPL-MCNC: 66 MG/DL (ref 40–60)
INTERPRETATION: NORMAL
LDLC SERPL CALC-MCNC: 37 MG/DL (ref 0–100)
Lab: NORMAL
POTASSIUM SERPL-SCNC: 4.2 MMOL/L (ref 3.5–5.2)
PROT SERPL-MCNC: 6.5 G/DL (ref 6–8.5)
SODIUM SERPL-SCNC: 140 MMOL/L (ref 136–145)
T3FREE SERPL-MCNC: 2.8 PG/ML (ref 2–4.4)
T4 FREE SERPL-MCNC: 1.72 NG/DL (ref 0.93–1.7)
T4 SERPL-MCNC: 11.2 MCG/DL (ref 4.5–11.7)
THYROGLOB AB SERPL-ACNC: <1 IU/ML (ref 0–0.9)
THYROGLOB SERPL-MCNC: 12.9 NG/ML (ref 1.5–38.5)
TRIGL SERPL-MCNC: 109 MG/DL (ref 0–150)
TSH SERPL DL<=0.005 MIU/L-ACNC: 2.17 MIU/ML (ref 0.27–4.2)
URATE SERPL-MCNC: 4.4 MG/DL (ref 2.4–5.7)
VLDLC SERPL CALC-MCNC: 21.8 MG/DL

## 2019-06-13 ENCOUNTER — OFFICE VISIT (OUTPATIENT)
Dept: ENDOCRINOLOGY | Age: 69
End: 2019-06-13

## 2019-06-13 VITALS
HEIGHT: 66 IN | SYSTOLIC BLOOD PRESSURE: 118 MMHG | BODY MASS INDEX: 29.51 KG/M2 | WEIGHT: 183.6 LBS | RESPIRATION RATE: 16 BRPM | DIASTOLIC BLOOD PRESSURE: 78 MMHG

## 2019-06-13 DIAGNOSIS — E78.5 DYSLIPIDEMIA: ICD-10-CM

## 2019-06-13 DIAGNOSIS — I10 BENIGN ESSENTIAL HYPERTENSION: ICD-10-CM

## 2019-06-13 DIAGNOSIS — E66.3 EXCESS WEIGHT: ICD-10-CM

## 2019-06-13 DIAGNOSIS — E55.9 VITAMIN D DEFICIENCY: ICD-10-CM

## 2019-06-13 DIAGNOSIS — E79.0 HYPERURICEMIA: ICD-10-CM

## 2019-06-13 DIAGNOSIS — N95.1 SYMPTOMATIC MENOPAUSAL OR FEMALE CLIMACTERIC STATES: ICD-10-CM

## 2019-06-13 DIAGNOSIS — E03.9 PRIMARY HYPOTHYROIDISM: Primary | ICD-10-CM

## 2019-06-13 PROCEDURE — 99214 OFFICE O/P EST MOD 30 MIN: CPT | Performed by: INTERNAL MEDICINE

## 2019-06-13 RX ORDER — METOPROLOL SUCCINATE 50 MG/1
50 TABLET, EXTENDED RELEASE ORAL DAILY
Qty: 90 TABLET | Refills: 3 | Status: SHIPPED | OUTPATIENT
Start: 2019-06-13 | End: 2020-01-21 | Stop reason: SDUPTHER

## 2019-06-13 RX ORDER — LEVOTHYROXINE SODIUM 125 MCG
125 TABLET ORAL DAILY
Qty: 90 TABLET | Refills: 1 | Status: SHIPPED | OUTPATIENT
Start: 2019-06-13 | End: 2019-12-10 | Stop reason: SDUPTHER

## 2019-06-13 RX ORDER — ALLOPURINOL 100 MG/1
100 TABLET ORAL DAILY
Qty: 90 TABLET | Refills: 3 | Status: SHIPPED | OUTPATIENT
Start: 2019-06-13 | End: 2020-01-21 | Stop reason: SDUPTHER

## 2019-06-13 RX ORDER — ESTRADIOL 1 MG/1
1 TABLET ORAL DAILY
Qty: 90 TABLET | Refills: 3 | Status: SHIPPED | OUTPATIENT
Start: 2019-06-13 | End: 2020-01-21 | Stop reason: SDUPTHER

## 2019-06-13 RX ORDER — ATORVASTATIN CALCIUM 10 MG/1
10 TABLET, FILM COATED ORAL DAILY
Qty: 90 TABLET | Refills: 3 | Status: SHIPPED | OUTPATIENT
Start: 2019-06-13 | End: 2020-01-21 | Stop reason: SDUPTHER

## 2019-06-13 NOTE — PROGRESS NOTES
"Subjective   Onelia Alegre is a 68 y.o. female seen for follow up for hypothyroidism, vit d deficiency, lab review. Patient denies any problems or concerns.     History of Present Illness this is a 68-year-old female known patient with hypothyroidism and hypertension and dyslipidemia as well as menopausal symptoms and hyperuricemia with vitamin D deficiency.  Over the course of last 6 months she has had no significant health problem for which to go to the ER or hospital.    /78   Resp 16   Ht 167.6 cm (66\")   Wt 83.3 kg (183 lb 9.6 oz)   LMP  (LMP Unknown)   BMI 29.63 kg/m²      Allergies   Allergen Reactions   • Actonel [Risedronate Sodium] Other (See Comments)     Other reaction(s): ARTHRALGIAS   • Penicillins Other (See Comments) and Rash     Other reaction(s): JOINT PAIN   • Sulfa Antibiotics Rash       Current Outpatient Medications:   •  allopurinol (ZYLOPRIM) 100 MG tablet, Take 1 tablet by mouth Daily., Disp: 90 tablet, Rfl: 3  •  atorvastatin (LIPITOR) 10 MG tablet, Take 1 tablet by mouth Daily. For cholesterol, Disp: 90 tablet, Rfl: 3  •  Calcium Carbonate-Vitamin D (CALCIUM-VITAMIN D) 600-200 MG-UNIT capsule, Take by mouth., Disp: , Rfl:   •  cetirizine (ZyrTEC) 10 MG tablet, Take 10 mg by mouth daily. Take one tablet daily as needed, Disp: , Rfl:   •  cholecalciferol (VITAMIN D3) 1000 UNITS tablet, Take 2,000 Units by mouth Daily., Disp: , Rfl:   •  clotrimazole (LOTRIMIN AF) 1 % cream, Apply  topically to the appropriate area as directed Every 12 (Twelve) Hours. To rash right foot until 2 weeks past clear, Disp: 60 g, Rfl: 1  •  estradiol (ESTRACE) 1 MG tablet, Take 1 tablet by mouth Daily., Disp: 90 tablet, Rfl: 3  •  melatonin tablet, Take by mouth., Disp: , Rfl:   •  metoprolol succinate XL (TOPROL-XL) 50 MG 24 hr tablet, Take 1 tablet by mouth Daily. For BP and palpitations, Disp: 90 tablet, Rfl: 3  •  Multiple Vitamin (MULTIVITAMIN) tablet, Take 1 tablet by mouth daily., Disp: , " Rfl:   •  progesterone (PROMETRIUM) 200 MG capsule, Take 1 capsule by mouth Daily., Disp: 90 capsule, Rfl: 3  •  SYNTHROID 125 MCG tablet, Take 1 tablet by mouth Daily., Disp: 90 tablet, Rfl: 1  •  triamterene-hydrochlorothiazide (DYAZIDE) 37.5-25 MG per capsule, Take 1 capsule by mouth Every Morning. For BP and edema, Disp: 90 capsule, Rfl: 3      The following portions of the patient's history were reviewed and updated as appropriate: allergies, current medications, past family history, past medical history, past social history, past surgical history and problem list.    Review of Systems   Constitutional: Negative.    HENT: Negative.    Eyes: Negative.    Respiratory: Negative.    Cardiovascular: Negative.    Gastrointestinal: Negative.    Endocrine: Negative.    Genitourinary: Negative.    Musculoskeletal: Negative.    Skin: Negative.    Allergic/Immunologic: Negative.    Neurological: Negative.    Hematological: Negative.    Psychiatric/Behavioral: Negative.        Objective   Physical Exam   Constitutional: She is oriented to person, place, and time. She appears well-developed and well-nourished. No distress.   HENT:   Head: Normocephalic and atraumatic.   Right Ear: External ear normal.   Left Ear: External ear normal.   Nose: Nose normal.   Mouth/Throat: Oropharynx is clear and moist. No oropharyngeal exudate.   Eyes: Conjunctivae and EOM are normal. Pupils are equal, round, and reactive to light. Right eye exhibits no discharge. Left eye exhibits no discharge. No scleral icterus.   Neck: Normal range of motion. Neck supple. No JVD present. No tracheal deviation present. Thyromegaly present.   Very find the nodular goiter.  It most freely with swallowing and is not associated with tenderness or lymphadenopathy.   Cardiovascular: Normal rate, regular rhythm, normal heart sounds and intact distal pulses. Exam reveals no gallop and no friction rub.   No murmur heard.  Pulmonary/Chest: Effort normal and breath  sounds normal. No stridor. No respiratory distress. She has no wheezes. She has no rales. She exhibits no tenderness.   Abdominal: Soft. Bowel sounds are normal. She exhibits no distension and no mass. There is no tenderness. There is no rebound and no guarding. No hernia.   Musculoskeletal: Normal range of motion. She exhibits no edema, tenderness or deformity.   Lymphadenopathy:     She has no cervical adenopathy.   Neurological: She is alert and oriented to person, place, and time. She has normal reflexes. She displays normal reflexes. No cranial nerve deficit or sensory deficit. She exhibits normal muscle tone. Coordination normal.   Skin: Skin is warm and dry. No rash noted. She is not diaphoretic. No erythema. No pallor.   Psychiatric: She has a normal mood and affect. Her behavior is normal. Judgment and thought content normal.   Nursing note and vitals reviewed.       Results for orders placed or performed in visit on 04/29/19   T3, Free   Result Value Ref Range    T3, Free 2.8 2.0 - 4.4 pg/mL   T4 & TSH ([facility])   Result Value Ref Range    TSH 2.170 0.270 - 4.200 mIU/mL    T4, Total 11.20 4.50 - 11.70 mcg/dL   T4, Free   Result Value Ref Range    Free T4 1.72 (H) 0.93 - 1.70 ng/dL   Thyroglobulin With Anti-TG   Result Value Ref Range    Thyroglobulin Ab <1.0 0.0 - 0.9 IU/mL   Uric Acid   Result Value Ref Range    Uric Acid 4.4 2.4 - 5.7 mg/dL   Vitamin D 25 Hydroxy   Result Value Ref Range    25 Hydroxy, Vitamin D 78.0 30.0 - 100.0 ng/ml   Comprehensive Metabolic Panel   Result Value Ref Range    Glucose 85 65 - 99 mg/dL    BUN 13 8 - 23 mg/dL    Creatinine 0.94 0.57 - 1.00 mg/dL    eGFR Non African Am 59 (L) >60 mL/min/1.73    eGFR African Am 71 >60 mL/min/1.73    BUN/Creatinine Ratio 13.8 7.0 - 25.0    Sodium 140 136 - 145 mmol/L    Potassium 4.2 3.5 - 5.2 mmol/L    Chloride 101 98 - 107 mmol/L    Total CO2 27.6 22.0 - 29.0 mmol/L    Calcium 10.2 8.6 - 10.5 mg/dL    Total Protein 6.5 6.0 - 8.5 g/dL     Albumin 4.00 3.50 - 5.20 g/dL    Globulin 2.5 gm/dL    A/G Ratio 1.6 g/dL    Total Bilirubin 0.9 0.2 - 1.2 mg/dL    Alkaline Phosphatase 73 39 - 117 U/L    AST (SGOT) 14 1 - 32 U/L    ALT (SGPT) 14 1 - 33 U/L   C-Peptide   Result Value Ref Range    C-Peptide 3.7 1.1 - 4.4 ng/mL   Hemoglobin A1c   Result Value Ref Range    Hemoglobin A1C 5.30 4.80 - 5.60 %   Lipid Panel   Result Value Ref Range    Total Cholesterol 125 0 - 200 mg/dL    Triglycerides 109 0 - 150 mg/dL    HDL Cholesterol 66 (H) 40 - 60 mg/dL    VLDL Cholesterol 21.8 mg/dL    LDL Cholesterol  37 0 - 100 mg/dL   Cardiovascular Risk Assessment   Result Value Ref Range    Interpretation Note    Diabetes Patient Education   Result Value Ref Range    PDF Image Not applicable    Thyroglobulin By EMILIANO   Result Value Ref Range    THYROGLOBULIN BY EMILIANO 12.9 1.5 - 38.5 ng/mL         Assessment/Plan   Diagnoses and all orders for this visit:    Primary hypothyroidism  -     T3, Free; Future  -     T4 & TSH ([facility]); Future  -     T4, Free; Future  -     Thyroglobulin With Anti-TG; Future  -     Uric Acid; Future  -     Vitamin D 25 Hydroxy; Future  -     Comprehensive Metabolic Panel; Future  -     C-Peptide; Future  -     Hemoglobin A1c; Future  -     Lipid Panel; Future  -     MicroAlbumin, Urine, Random - Urine, Clean Catch; Future    Benign essential hypertension  -     T3, Free; Future  -     T4 & TSH ([facility]); Future  -     T4, Free; Future  -     Thyroglobulin With Anti-TG; Future  -     Uric Acid; Future  -     Vitamin D 25 Hydroxy; Future  -     Comprehensive Metabolic Panel; Future  -     C-Peptide; Future  -     Hemoglobin A1c; Future  -     Lipid Panel; Future  -     MicroAlbumin, Urine, Random - Urine, Clean Catch; Future    Vitamin D deficiency  -     T3, Free; Future  -     T4 & TSH ([facility]); Future  -     T4, Free; Future  -     Thyroglobulin With Anti-TG; Future  -     Uric Acid; Future  -     Vitamin D 25 Hydroxy; Future  -     Comprehensive  Metabolic Panel; Future  -     C-Peptide; Future  -     Hemoglobin A1c; Future  -     Lipid Panel; Future  -     MicroAlbumin, Urine, Random - Urine, Clean Catch; Future    Symptomatic menopausal or female climacteric states  -     T3, Free; Future  -     T4 & TSH ([facility]); Future  -     T4, Free; Future  -     Thyroglobulin With Anti-TG; Future  -     Uric Acid; Future  -     Vitamin D 25 Hydroxy; Future  -     Comprehensive Metabolic Panel; Future  -     C-Peptide; Future  -     Hemoglobin A1c; Future  -     Lipid Panel; Future  -     MicroAlbumin, Urine, Random - Urine, Clean Catch; Future    Excess weight  -     T3, Free; Future  -     T4 & TSH ([facility]); Future  -     T4, Free; Future  -     Thyroglobulin With Anti-TG; Future  -     Uric Acid; Future  -     Vitamin D 25 Hydroxy; Future  -     Comprehensive Metabolic Panel; Future  -     C-Peptide; Future  -     Hemoglobin A1c; Future  -     Lipid Panel; Future  -     MicroAlbumin, Urine, Random - Urine, Clean Catch; Future    Dyslipidemia  -     T3, Free; Future  -     T4 & TSH ([facility]); Future  -     T4, Free; Future  -     Thyroglobulin With Anti-TG; Future  -     Uric Acid; Future  -     Vitamin D 25 Hydroxy; Future  -     Comprehensive Metabolic Panel; Future  -     C-Peptide; Future  -     Hemoglobin A1c; Future  -     Lipid Panel; Future  -     MicroAlbumin, Urine, Random - Urine, Clean Catch; Future    Hyperuricemia  -     T3, Free; Future  -     T4 & TSH ([facility]); Future  -     T4, Free; Future  -     Thyroglobulin With Anti-TG; Future  -     Uric Acid; Future  -     Vitamin D 25 Hydroxy; Future  -     Comprehensive Metabolic Panel; Future  -     C-Peptide; Future  -     Hemoglobin A1c; Future  -     Lipid Panel; Future  -     MicroAlbumin, Urine, Random - Urine, Clean Catch; Future    Other orders  -     SYNTHROID 125 MCG tablet; Take 1 tablet by mouth Daily.  -     progesterone (PROMETRIUM) 200 MG capsule; Take 1 capsule by mouth Daily.  -      metoprolol succinate XL (TOPROL-XL) 50 MG 24 hr tablet; Take 1 tablet by mouth Daily. For BP and palpitations  -     estradiol (ESTRACE) 1 MG tablet; Take 1 tablet by mouth Daily.  -     atorvastatin (LIPITOR) 10 MG tablet; Take 1 tablet by mouth Daily. For cholesterol  -     allopurinol (ZYLOPRIM) 100 MG tablet; Take 1 tablet by mouth Daily.      In summary I saw and examined this 68-year-old female for above-mentioned problems.  I reviewed her laboratory evaluation of May 30, 2019 and provided her with a hard copy of it.  Overall she is clinically and metabolically stable and therefore we will go ahead and continue all her current prescriptions.  I will see her in 6 months or sooner if needed with laboratory evaluation prior to each office visit.

## 2019-09-24 ENCOUNTER — TELEPHONE (OUTPATIENT)
Dept: FAMILY MEDICINE CLINIC | Facility: CLINIC | Age: 69
End: 2019-09-24

## 2019-09-24 DIAGNOSIS — I10 BENIGN ESSENTIAL HYPERTENSION: Primary | ICD-10-CM

## 2019-09-24 DIAGNOSIS — E55.9 VITAMIN D DEFICIENCY: ICD-10-CM

## 2019-09-24 DIAGNOSIS — E03.9 PRIMARY HYPOTHYROIDISM: ICD-10-CM

## 2019-09-25 LAB
25(OH)D3+25(OH)D2 SERPL-MCNC: 100.1 NG/ML (ref 30–100)
ALBUMIN SERPL-MCNC: 4.5 G/DL (ref 3.5–5.2)
ALBUMIN/GLOB SERPL: 2 G/DL
ALP SERPL-CCNC: 72 U/L (ref 39–117)
ALT SERPL-CCNC: 16 U/L (ref 1–33)
AST SERPL-CCNC: 15 U/L (ref 1–32)
BASOPHILS # BLD AUTO: 0.06 10*3/MM3 (ref 0–0.2)
BASOPHILS NFR BLD AUTO: 0.6 % (ref 0–1.5)
BILIRUB SERPL-MCNC: 1 MG/DL (ref 0.2–1.2)
BUN SERPL-MCNC: 17 MG/DL (ref 8–23)
BUN/CREAT SERPL: 18.5 (ref 7–25)
CALCIUM SERPL-MCNC: 9.7 MG/DL (ref 8.6–10.5)
CHLORIDE SERPL-SCNC: 98 MMOL/L (ref 98–107)
CHOLEST SERPL-MCNC: 133 MG/DL (ref 0–200)
CO2 SERPL-SCNC: 27.5 MMOL/L (ref 22–29)
CREAT SERPL-MCNC: 0.92 MG/DL (ref 0.57–1)
EOSINOPHIL # BLD AUTO: 0.1 10*3/MM3 (ref 0–0.4)
EOSINOPHIL NFR BLD AUTO: 1.1 % (ref 0.3–6.2)
ERYTHROCYTE [DISTWIDTH] IN BLOOD BY AUTOMATED COUNT: 12.1 % (ref 12.3–15.4)
GLOBULIN SER CALC-MCNC: 2.3 GM/DL
GLUCOSE SERPL-MCNC: 92 MG/DL (ref 65–99)
HCT VFR BLD AUTO: 44.2 % (ref 34–46.6)
HDLC SERPL-MCNC: 63 MG/DL (ref 40–60)
HGB BLD-MCNC: 14.9 G/DL (ref 12–15.9)
IMM GRANULOCYTES # BLD AUTO: 0.03 10*3/MM3 (ref 0–0.05)
IMM GRANULOCYTES NFR BLD AUTO: 0.3 % (ref 0–0.5)
LDLC SERPL CALC-MCNC: 41 MG/DL (ref 0–100)
LYMPHOCYTES # BLD AUTO: 2.64 10*3/MM3 (ref 0.7–3.1)
LYMPHOCYTES NFR BLD AUTO: 28.5 % (ref 19.6–45.3)
MCH RBC QN AUTO: 32.7 PG (ref 26.6–33)
MCHC RBC AUTO-ENTMCNC: 33.7 G/DL (ref 31.5–35.7)
MCV RBC AUTO: 97.1 FL (ref 79–97)
MONOCYTES # BLD AUTO: 0.68 10*3/MM3 (ref 0.1–0.9)
MONOCYTES NFR BLD AUTO: 7.3 % (ref 5–12)
NEUTROPHILS # BLD AUTO: 5.76 10*3/MM3 (ref 1.7–7)
NEUTROPHILS NFR BLD AUTO: 62.2 % (ref 42.7–76)
NRBC BLD AUTO-RTO: 0 /100 WBC (ref 0–0.2)
PLATELET # BLD AUTO: 278 10*3/MM3 (ref 140–450)
POTASSIUM SERPL-SCNC: 4.1 MMOL/L (ref 3.5–5.2)
PROT SERPL-MCNC: 6.8 G/DL (ref 6–8.5)
RBC # BLD AUTO: 4.55 10*6/MM3 (ref 3.77–5.28)
SODIUM SERPL-SCNC: 139 MMOL/L (ref 136–145)
T4 FREE SERPL-MCNC: 2.08 NG/DL (ref 0.93–1.7)
TRIGL SERPL-MCNC: 147 MG/DL (ref 0–150)
TSH SERPL DL<=0.005 MIU/L-ACNC: 0.96 UIU/ML (ref 0.27–4.2)
VLDLC SERPL CALC-MCNC: 29.4 MG/DL
WBC # BLD AUTO: 9.27 10*3/MM3 (ref 3.4–10.8)

## 2019-09-30 ENCOUNTER — OFFICE VISIT (OUTPATIENT)
Dept: FAMILY MEDICINE CLINIC | Facility: CLINIC | Age: 69
End: 2019-09-30

## 2019-09-30 VITALS
RESPIRATION RATE: 16 BRPM | HEART RATE: 73 BPM | HEIGHT: 66 IN | WEIGHT: 183 LBS | DIASTOLIC BLOOD PRESSURE: 78 MMHG | BODY MASS INDEX: 29.41 KG/M2 | OXYGEN SATURATION: 98 % | TEMPERATURE: 97.7 F | SYSTOLIC BLOOD PRESSURE: 120 MMHG

## 2019-09-30 DIAGNOSIS — I10 BENIGN ESSENTIAL HYPERTENSION: ICD-10-CM

## 2019-09-30 DIAGNOSIS — Z23 FLU VACCINE NEED: Primary | ICD-10-CM

## 2019-09-30 DIAGNOSIS — M85.80 OSTEOPENIA, UNSPECIFIED LOCATION: ICD-10-CM

## 2019-09-30 DIAGNOSIS — Z12.31 ENCOUNTER FOR SCREENING MAMMOGRAM FOR BREAST CANCER: ICD-10-CM

## 2019-09-30 PROCEDURE — 99213 OFFICE O/P EST LOW 20 MIN: CPT | Performed by: PHYSICIAN ASSISTANT

## 2019-09-30 PROCEDURE — G0008 ADMIN INFLUENZA VIRUS VAC: HCPCS | Performed by: PHYSICIAN ASSISTANT

## 2019-09-30 PROCEDURE — 90653 IIV ADJUVANT VACCINE IM: CPT | Performed by: PHYSICIAN ASSISTANT

## 2019-09-30 NOTE — PROGRESS NOTES
Subjective   Onelia Alegre is a 68 y.o. female.     History of Present Illness    Since the last visit, she has overall felt well.  She has Essential Hypertension and well controlled on current medication, Impaired fasting glucose and will monitor labs to watch for DMII, Hyperlipidemia with goals met with current Rx, Hypothyroidism and remains under the care of their Endocrinologist, Seasonal allergies and doing well on their medication  and Vitamin D deficiency and labs are at goal >30 ng/mL.  she has been compliant with current medications have reviewed them.  The patient denies medication side effects.  Will refill medications.    Results for orders placed or performed in visit on 09/24/19   Comprehensive metabolic panel   Result Value Ref Range    Glucose 92 65 - 99 mg/dL    BUN 17 8 - 23 mg/dL    Creatinine 0.92 0.57 - 1.00 mg/dL    eGFR Non African Am 60 (L) >60 mL/min/1.73    eGFR African Am 73 >60 mL/min/1.73    BUN/Creatinine Ratio 18.5 7.0 - 25.0    Sodium 139 136 - 145 mmol/L    Potassium 4.1 3.5 - 5.2 mmol/L    Chloride 98 98 - 107 mmol/L    Total CO2 27.5 22.0 - 29.0 mmol/L    Calcium 9.7 8.6 - 10.5 mg/dL    Total Protein 6.8 6.0 - 8.5 g/dL    Albumin 4.50 3.50 - 5.20 g/dL    Globulin 2.3 gm/dL    A/G Ratio 2.0 g/dL    Total Bilirubin 1.0 0.2 - 1.2 mg/dL    Alkaline Phosphatase 72 39 - 117 U/L    AST (SGOT) 15 1 - 32 U/L    ALT (SGPT) 16 1 - 33 U/L   Lipid panel   Result Value Ref Range    Total Cholesterol 133 0 - 200 mg/dL    Triglycerides 147 0 - 150 mg/dL    HDL Cholesterol 63 (H) 40 - 60 mg/dL    VLDL Cholesterol 29.4 mg/dL    LDL Cholesterol  41 0 - 100 mg/dL   TSH   Result Value Ref Range    TSH 0.962 0.270 - 4.200 uIU/mL   Vitamin D 25 Hydroxy   Result Value Ref Range    25 Hydroxy, Vitamin D 100.1 (H) 30.0 - 100.0 ng/ml   T4, Free   Result Value Ref Range    Free T4 2.08 (H) 0.93 - 1.70 ng/dL   CBC and Differential   Result Value Ref Range    WBC 9.27 3.40 - 10.80 10*3/mm3    RBC 4.55 3.77  - 5.28 10*6/mm3    Hemoglobin 14.9 12.0 - 15.9 g/dL    Hematocrit 44.2 34.0 - 46.6 %    MCV 97.1 (H) 79.0 - 97.0 fL    MCH 32.7 26.6 - 33.0 pg    MCHC 33.7 31.5 - 35.7 g/dL    RDW 12.1 (L) 12.3 - 15.4 %    Platelets 278 140 - 450 10*3/mm3    Neutrophil Rel % 62.2 42.7 - 76.0 %    Lymphocyte Rel % 28.5 19.6 - 45.3 %    Monocyte Rel % 7.3 5.0 - 12.0 %    Eosinophil Rel % 1.1 0.3 - 6.2 %    Basophil Rel % 0.6 0.0 - 1.5 %    Neutrophils Absolute 5.76 1.70 - 7.00 10*3/mm3    Lymphocytes Absolute 2.64 0.70 - 3.10 10*3/mm3    Monocytes Absolute 0.68 0.10 - 0.90 10*3/mm3    Eosinophils Absolute 0.10 0.00 - 0.40 10*3/mm3    Basophils Absolute 0.06 0.00 - 0.20 10*3/mm3    Immature Granulocyte Rel % 0.3 0.0 - 0.5 %    Immature Grans Absolute 0.03 0.00 - 0.05 10*3/mm3    nRBC 0.0 0.0 - 0.2 /100 WBC   will keep Vit D and was at 100      Need to exercise  Flu shot today  I see that the free T4 is in baselines of what DR Farooq wants; has appt [facility]  No recent gout  On HRT; mammo due Jan and had DEXA Jan 2019    CBC was fine  Need to exercise  Had one Shingrix and rxn to vaccine  The following portions of the patient's history were reviewed and updated as appropriate: allergies, current medications, past family history, past medical history, past social history, past surgical history and problem list.    Review of Systems   Constitutional: Negative for activity change, appetite change and unexpected weight change.   HENT: Negative for nosebleeds and trouble swallowing.    Eyes: Negative for pain and visual disturbance.   Respiratory: Negative for chest tightness, shortness of breath and wheezing.    Cardiovascular: Negative for chest pain and palpitations.   Gastrointestinal: Negative for abdominal pain and blood in stool.   Endocrine: Negative.    Genitourinary: Negative for difficulty urinating and hematuria.   Musculoskeletal: Negative for joint swelling.   Skin: Negative for color change and rash.   Allergic/Immunologic:  Negative.    Neurological: Negative for syncope and speech difficulty.   Hematological: Negative for adenopathy.   Psychiatric/Behavioral: Negative for agitation and confusion.   All other systems reviewed and are negative.      Objective   Physical Exam   Constitutional: She is oriented to person, place, and time. She appears well-developed and well-nourished. No distress.   HENT:   Head: Normocephalic and atraumatic.   Eyes: Conjunctivae and EOM are normal. Pupils are equal, round, and reactive to light. Right eye exhibits no discharge. Left eye exhibits no discharge. No scleral icterus.   Neck: Normal range of motion. Neck supple. No tracheal deviation present. Thyromegaly present.   Cardiovascular: Normal rate, regular rhythm, normal heart sounds, intact distal pulses and normal pulses. Exam reveals no gallop.   No murmur heard.  No edema now   Pulmonary/Chest: Effort normal and breath sounds normal. No respiratory distress. She has no wheezes. She has no rales.   Musculoskeletal: Normal range of motion.   Lymphadenopathy:     She has no cervical adenopathy.   Neurological: She is alert and oriented to person, place, and time. She exhibits normal muscle tone. Coordination normal.   Skin: Skin is warm. No rash noted. No erythema. No pallor.   Psychiatric: She has a normal mood and affect. Her behavior is normal. Judgment and thought content normal.   Nursing note and vitals reviewed.      Assessment/Plan   Onelia was seen today for follow-up, med refill, hypertension and flu vaccine.    Diagnoses and all orders for this visit:    Flu vaccine need  -     Fluad Tri 65yr+    Benign essential hypertension    Osteopenia, unspecified location    flu shot  mammo due [facility]  Has thyroid goiter and sees DR Oseas Taylor, Onelia LELO Codey, was seen today.  she was seen for HTN and continue medication, Imparied fasting glucose and plan follow up labs, diet, and exercise, Hyperlipidemia and will continue current medication,  Hypothyroidism and under the care of Endocrinologist, Seasonal allergies and is doing well on their medication PRN and Vitamin D deficiency and supplemented.

## 2019-09-30 NOTE — PATIENT INSTRUCTIONS

## 2019-12-02 ENCOUNTER — RESULTS ENCOUNTER (OUTPATIENT)
Dept: ENDOCRINOLOGY | Age: 69
End: 2019-12-02

## 2019-12-02 DIAGNOSIS — E03.9 PRIMARY HYPOTHYROIDISM: ICD-10-CM

## 2019-12-02 DIAGNOSIS — E66.3 EXCESS WEIGHT: ICD-10-CM

## 2019-12-02 DIAGNOSIS — E55.9 VITAMIN D DEFICIENCY: ICD-10-CM

## 2019-12-02 DIAGNOSIS — E78.5 DYSLIPIDEMIA: ICD-10-CM

## 2019-12-02 DIAGNOSIS — I10 BENIGN ESSENTIAL HYPERTENSION: ICD-10-CM

## 2019-12-02 DIAGNOSIS — E79.0 HYPERURICEMIA: ICD-10-CM

## 2019-12-02 DIAGNOSIS — N95.1 SYMPTOMATIC MENOPAUSAL OR FEMALE CLIMACTERIC STATES: ICD-10-CM

## 2019-12-10 RX ORDER — LEVOTHYROXINE SODIUM 125 MCG
TABLET ORAL
Qty: 90 TABLET | Refills: 0 | Status: SHIPPED | OUTPATIENT
Start: 2019-12-10 | End: 2020-01-21 | Stop reason: SDUPTHER

## 2019-12-16 ENCOUNTER — HOSPITAL ENCOUNTER (OUTPATIENT)
Dept: CARDIOLOGY | Facility: HOSPITAL | Age: 69
Discharge: HOME | End: 2019-12-16
Admitting: SPECIALIST

## 2019-12-16 ENCOUNTER — TRANSCRIBE ORDERS (OUTPATIENT)
Dept: CARDIOLOGY | Facility: HOSPITAL | Age: 69
End: 2019-12-16

## 2019-12-16 ENCOUNTER — LAB (OUTPATIENT)
Dept: LAB | Facility: HOSPITAL | Age: 69
End: 2019-12-16

## 2019-12-16 DIAGNOSIS — I10 HYPERTENSION, UNSPECIFIED TYPE: ICD-10-CM

## 2019-12-16 DIAGNOSIS — I10 HYPERTENSION, UNSPECIFIED TYPE: Primary | ICD-10-CM

## 2019-12-16 LAB
ANION GAP SERPL CALCULATED.3IONS-SCNC: 12.1 MMOL/L (ref 5–15)
BUN BLD-MCNC: 20 MG/DL (ref 8–23)
BUN/CREAT SERPL: 21.5 (ref 7–25)
CALCIUM SPEC-SCNC: 9.7 MG/DL (ref 8.6–10.5)
CHLORIDE SERPL-SCNC: 98 MMOL/L (ref 98–107)
CO2 SERPL-SCNC: 26.9 MMOL/L (ref 22–29)
CREAT BLD-MCNC: 0.93 MG/DL (ref 0.57–1)
DEPRECATED RDW RBC AUTO: 45.7 FL (ref 37–54)
ERYTHROCYTE [DISTWIDTH] IN BLOOD BY AUTOMATED COUNT: 12.7 % (ref 12.3–15.4)
GFR SERPL CREATININE-BSD FRML MDRD: 60 ML/MIN/1.73
GLUCOSE BLD-MCNC: 92 MG/DL (ref 65–99)
HCT VFR BLD AUTO: 42.8 % (ref 34–46.6)
HGB BLD-MCNC: 14.3 G/DL (ref 12–15.9)
MCH RBC QN AUTO: 32.8 PG (ref 26.6–33)
MCHC RBC AUTO-ENTMCNC: 33.4 G/DL (ref 31.5–35.7)
MCV RBC AUTO: 98.2 FL (ref 79–97)
PLATELET # BLD AUTO: 341 10*3/MM3 (ref 140–450)
PMV BLD AUTO: 9.5 FL (ref 6–12)
POTASSIUM BLD-SCNC: 3.7 MMOL/L (ref 3.5–5.2)
RBC # BLD AUTO: 4.36 10*6/MM3 (ref 3.77–5.28)
SODIUM BLD-SCNC: 137 MMOL/L (ref 136–145)
WBC NRBC COR # BLD: 15.98 10*3/MM3 (ref 3.4–10.8)

## 2019-12-16 PROCEDURE — 85027 COMPLETE CBC AUTOMATED: CPT

## 2019-12-16 PROCEDURE — 80048 BASIC METABOLIC PNL TOTAL CA: CPT

## 2019-12-16 PROCEDURE — 93010 ELECTROCARDIOGRAM REPORT: CPT | Performed by: INTERNAL MEDICINE

## 2019-12-16 PROCEDURE — 93005 ELECTROCARDIOGRAM TRACING: CPT | Performed by: SPECIALIST

## 2019-12-16 PROCEDURE — 36415 COLL VENOUS BLD VENIPUNCTURE: CPT

## 2020-01-08 LAB
25(OH)D3+25(OH)D2 SERPL-MCNC: 73.5 NG/ML (ref 30–100)
ALBUMIN SERPL-MCNC: 4.3 G/DL (ref 3.5–5.2)
ALBUMIN/GLOB SERPL: 1.8 G/DL
ALP SERPL-CCNC: 59 U/L (ref 39–117)
ALT SERPL-CCNC: 11 U/L (ref 1–33)
AST SERPL-CCNC: 15 U/L (ref 1–32)
BILIRUB SERPL-MCNC: 0.8 MG/DL (ref 0.2–1.2)
BUN SERPL-MCNC: 18 MG/DL (ref 8–23)
BUN/CREAT SERPL: 20 (ref 7–25)
C PEPTIDE SERPL-MCNC: 4.1 NG/ML (ref 1.1–4.4)
CALCIUM SERPL-MCNC: 9.5 MG/DL (ref 8.6–10.5)
CHLORIDE SERPL-SCNC: 99 MMOL/L (ref 98–107)
CHOLEST SERPL-MCNC: 138 MG/DL (ref 0–200)
CO2 SERPL-SCNC: 24.8 MMOL/L (ref 22–29)
CREAT SERPL-MCNC: 0.9 MG/DL (ref 0.57–1)
GLOBULIN SER CALC-MCNC: 2.4 GM/DL
GLUCOSE SERPL-MCNC: 88 MG/DL (ref 65–99)
HBA1C MFR BLD: 5.6 % (ref 4.8–5.6)
HDLC SERPL-MCNC: 63 MG/DL (ref 40–60)
INTERPRETATION: NORMAL
LDLC SERPL CALC-MCNC: 45 MG/DL (ref 0–100)
Lab: NORMAL
POTASSIUM SERPL-SCNC: 3.7 MMOL/L (ref 3.5–5.2)
PROT SERPL-MCNC: 6.7 G/DL (ref 6–8.5)
SODIUM SERPL-SCNC: 139 MMOL/L (ref 136–145)
T3FREE SERPL-MCNC: 3.4 PG/ML (ref 2–4.4)
T4 FREE SERPL-MCNC: 2.05 NG/DL (ref 0.93–1.7)
T4 SERPL-MCNC: 15.3 MCG/DL (ref 4.5–11.7)
THYROGLOB AB SERPL-ACNC: <1 IU/ML (ref 0–0.9)
THYROGLOB SERPL-MCNC: 10.9 NG/ML (ref 1.5–38.5)
TRIGL SERPL-MCNC: 149 MG/DL (ref 0–150)
TSH SERPL DL<=0.005 MIU/L-ACNC: 1.72 UIU/ML (ref 0.27–4.2)
URATE SERPL-MCNC: 4.7 MG/DL (ref 2.4–5.7)
VLDLC SERPL CALC-MCNC: 29.8 MG/DL

## 2020-01-21 ENCOUNTER — OFFICE VISIT (OUTPATIENT)
Dept: ENDOCRINOLOGY | Age: 70
End: 2020-01-21

## 2020-01-21 VITALS
WEIGHT: 184.6 LBS | HEIGHT: 66 IN | BODY MASS INDEX: 29.67 KG/M2 | SYSTOLIC BLOOD PRESSURE: 136 MMHG | RESPIRATION RATE: 16 BRPM | DIASTOLIC BLOOD PRESSURE: 80 MMHG

## 2020-01-21 DIAGNOSIS — E78.5 DYSLIPIDEMIA: ICD-10-CM

## 2020-01-21 DIAGNOSIS — E79.0 HYPERURICEMIA: ICD-10-CM

## 2020-01-21 DIAGNOSIS — E03.9 PRIMARY HYPOTHYROIDISM: Primary | ICD-10-CM

## 2020-01-21 DIAGNOSIS — N95.1 SYMPTOMATIC MENOPAUSAL OR FEMALE CLIMACTERIC STATES: ICD-10-CM

## 2020-01-21 DIAGNOSIS — I10 BENIGN ESSENTIAL HYPERTENSION: ICD-10-CM

## 2020-01-21 DIAGNOSIS — M85.80 OSTEOPENIA, UNSPECIFIED LOCATION: ICD-10-CM

## 2020-01-21 DIAGNOSIS — E55.9 VITAMIN D DEFICIENCY: ICD-10-CM

## 2020-01-21 PROCEDURE — 99214 OFFICE O/P EST MOD 30 MIN: CPT | Performed by: INTERNAL MEDICINE

## 2020-01-21 RX ORDER — ALLOPURINOL 100 MG/1
100 TABLET ORAL DAILY
Qty: 90 TABLET | Refills: 3 | Status: SHIPPED | OUTPATIENT
Start: 2020-01-21 | End: 2020-07-30 | Stop reason: SDUPTHER

## 2020-01-21 RX ORDER — ATORVASTATIN CALCIUM 10 MG/1
10 TABLET, FILM COATED ORAL DAILY
Qty: 90 TABLET | Refills: 3 | Status: SHIPPED | OUTPATIENT
Start: 2020-01-21 | End: 2020-05-12 | Stop reason: SDUPTHER

## 2020-01-21 RX ORDER — LEVOTHYROXINE SODIUM 125 MCG
125 TABLET ORAL DAILY
Qty: 90 TABLET | Refills: 3 | Status: SHIPPED | OUTPATIENT
Start: 2020-01-21 | End: 2020-06-30 | Stop reason: SDUPTHER

## 2020-01-21 RX ORDER — METOPROLOL SUCCINATE 50 MG/1
50 TABLET, EXTENDED RELEASE ORAL DAILY
Qty: 90 TABLET | Refills: 3 | Status: SHIPPED | OUTPATIENT
Start: 2020-01-21 | End: 2020-04-30 | Stop reason: SDUPTHER

## 2020-01-21 RX ORDER — ESTRADIOL 1 MG/1
1 TABLET ORAL DAILY
Qty: 90 TABLET | Refills: 3 | Status: SHIPPED | OUTPATIENT
Start: 2020-01-21 | End: 2020-05-12 | Stop reason: SDUPTHER

## 2020-01-21 NOTE — PROGRESS NOTES
"Subjective   Onelia Alegre is a 69 y.o. female seen for follow up for hypothyroidism, vit d deficiency, lab review. Patient states that she had knee surgery in 12/2019. She denies any problems or concerns.    History of Present Illness this is a 69-year-old female known patient with hypothyroidism as well as vitamin D deficiency and dyslipidemia as well as hyperuricemia with menopausal symptoms and osteopenia.  Over the course of last 12 months she has had no significant health problems with exception of having knee surgery about a month ago.    /80   Resp 16   Ht 167.6 cm (66\")   Wt 83.7 kg (184 lb 9.6 oz)   LMP  (LMP Unknown)   BMI 29.80 kg/m²      Allergies   Allergen Reactions   • Actonel [Risedronate Sodium] Other (See Comments)     Other reaction(s): ARTHRALGIAS   • Penicillins Other (See Comments) and Rash     Other reaction(s): JOINT PAIN   • Sulfa Antibiotics Rash       Current Outpatient Medications:   •  allopurinol (ZYLOPRIM) 100 MG tablet, Take 1 tablet by mouth Daily., Disp: 90 tablet, Rfl: 3  •  atorvastatin (LIPITOR) 10 MG tablet, Take 1 tablet by mouth Daily. For cholesterol, Disp: 90 tablet, Rfl: 3  •  Calcium Carbonate-Vitamin D (CALCIUM-VITAMIN D) 600-200 MG-UNIT capsule, Take by mouth., Disp: , Rfl:   •  cetirizine (ZyrTEC) 10 MG tablet, Take 10 mg by mouth daily. Take one tablet daily as needed, Disp: , Rfl:   •  cholecalciferol (VITAMIN D3) 1000 UNITS tablet, Take 2,000 Units by mouth Daily., Disp: , Rfl:   •  estradiol (ESTRACE) 1 MG tablet, Take 1 tablet by mouth Daily., Disp: 90 tablet, Rfl: 3  •  melatonin tablet, Take by mouth., Disp: , Rfl:   •  metoprolol succinate XL (TOPROL-XL) 50 MG 24 hr tablet, Take 1 tablet by mouth Daily. For BP and palpitations, Disp: 90 tablet, Rfl: 3  •  Multiple Vitamin (MULTIVITAMIN) tablet, Take 1 tablet by mouth daily., Disp: , Rfl:   •  progesterone (PROMETRIUM) 200 MG capsule, Take 1 capsule by mouth Daily., Disp: 90 capsule, Rfl: 3  •  " SYNTHROID 125 MCG tablet, TAKE ONE TABLET BY MOUTH DAILY, Disp: 90 tablet, Rfl: 0  •  triamterene-hydrochlorothiazide (DYAZIDE) 37.5-25 MG per capsule, Take 1 capsule by mouth Every Morning. For BP and edema, Disp: 90 capsule, Rfl: 3      The following portions of the patient's history were reviewed and updated as appropriate: allergies, current medications, past family history, past medical history, past social history, past surgical history and problem list.    Review of Systems   Constitutional: Negative.    HENT: Negative.    Eyes: Negative.    Respiratory: Negative.    Cardiovascular: Negative.    Gastrointestinal: Negative.    Endocrine: Negative.    Genitourinary: Negative.    Musculoskeletal: Negative.    Skin: Negative.    Allergic/Immunologic: Negative.    Neurological: Negative.    Hematological: Negative.    Psychiatric/Behavioral: Negative.    The above system review was reviewed, corroborated and accepted.    Objective   Physical Exam   Constitutional: She is oriented to person, place, and time. She appears well-developed and well-nourished. No distress.   HENT:   Head: Normocephalic and atraumatic.   Right Ear: External ear normal.   Left Ear: External ear normal.   Nose: Nose normal.   Mouth/Throat: Oropharynx is clear and moist. No oropharyngeal exudate.   Eyes: Pupils are equal, round, and reactive to light. Conjunctivae and EOM are normal. Right eye exhibits no discharge. Left eye exhibits no discharge. No scleral icterus.   Neck: Normal range of motion. Neck supple. No JVD present. No tracheal deviation present. Thyromegaly present.   Very find the nodular goiter.  It most freely with swallowing and is not associated with tenderness or lymphadenopathy.   Cardiovascular: Normal rate, regular rhythm, normal heart sounds and intact distal pulses. Exam reveals no gallop and no friction rub.   No murmur heard.  Pulmonary/Chest: Effort normal and breath sounds normal. No stridor. No respiratory  distress. She has no wheezes. She has no rales. She exhibits no tenderness.   Abdominal: Soft. Bowel sounds are normal. She exhibits no distension and no mass. There is no tenderness. There is no rebound and no guarding. No hernia.   Musculoskeletal: Normal range of motion. She exhibits no edema, tenderness or deformity.   Lymphadenopathy:     She has no cervical adenopathy.   Neurological: She is alert and oriented to person, place, and time. She has normal reflexes. She displays normal reflexes. No cranial nerve deficit or sensory deficit. She exhibits normal muscle tone. Coordination normal.   Skin: Skin is warm and dry. No rash noted. She is not diaphoretic. No erythema. No pallor.   Psychiatric: She has a normal mood and affect. Her behavior is normal. Judgment and thought content normal.   Nursing note and vitals reviewed.  No significant change since 6/13/2019 office visit.    Lab Results   Component Value Date    GLUCOSE 92 12/16/2019    BUN 18 01/07/2020    CREATININE 0.90 01/07/2020    EGFRIFNONA 62 01/07/2020    EGFRIFAFRI 75 01/07/2020    BCR 20.0 01/07/2020    K 3.7 01/07/2020    CO2 24.8 01/07/2020    CALCIUM 9.5 01/07/2020    PROTENTOTREF 6.7 01/07/2020    ALBUMIN 4.30 01/07/2020    LABIL2 1.8 01/07/2020    AST 15 01/07/2020    ALT 11 01/07/2020     Lab Results   Component Value Date    HGBA1C 5.60 01/07/2020     Lab Results   Component Value Date    TSH 1.720 01/07/2020     Lab Results   Component Value Date    CHLPL 138 01/07/2020    CHLPL 133 09/24/2019    CHLPL 125 05/30/2019     Lab Results   Component Value Date    TRIG 149 01/07/2020    TRIG 147 09/24/2019    TRIG 109 05/30/2019     Lab Results   Component Value Date    HDL 63 (H) 01/07/2020    HDL 63 (H) 09/24/2019    HDL 66 (H) 05/30/2019     Lab Results   Component Value Date    LDL 45 01/07/2020    LDL 41 09/24/2019    LDL 37 05/30/2019         Assessment/Plan   Onelia was seen today for hypothyroidism.    Diagnoses and all orders for this  visit:    Primary hypothyroidism  -     T4 & TSH ([facility]); Future  -     T3, Free; Future  -     T4, Free; Future  -     Thyroglobulin With Anti-TG; Future  -     Uric Acid; Future  -     Vitamin D 25 Hydroxy; Future  -     Comprehensive Metabolic Panel; Future  -     C-Peptide; Future  -     Hemoglobin A1c; Future  -     Lipid Panel; Future    Benign essential hypertension  -     T4 & TSH ([facility]); Future  -     T3, Free; Future  -     T4, Free; Future  -     Thyroglobulin With Anti-TG; Future  -     Uric Acid; Future  -     Vitamin D 25 Hydroxy; Future  -     Comprehensive Metabolic Panel; Future  -     C-Peptide; Future  -     Hemoglobin A1c; Future  -     Lipid Panel; Future    Vitamin D deficiency  -     T4 & TSH ([facility]); Future  -     T3, Free; Future  -     T4, Free; Future  -     Thyroglobulin With Anti-TG; Future  -     Uric Acid; Future  -     Vitamin D 25 Hydroxy; Future  -     Comprehensive Metabolic Panel; Future  -     C-Peptide; Future  -     Hemoglobin A1c; Future  -     Lipid Panel; Future    Osteopenia, unspecified location  -     T4 & TSH ([facility]); Future  -     T3, Free; Future  -     T4, Free; Future  -     Thyroglobulin With Anti-TG; Future  -     Uric Acid; Future  -     Vitamin D 25 Hydroxy; Future  -     Comprehensive Metabolic Panel; Future  -     C-Peptide; Future  -     Hemoglobin A1c; Future  -     Lipid Panel; Future    Symptomatic menopausal or female climacteric states  -     T4 & TSH ([facility]); Future  -     T3, Free; Future  -     T4, Free; Future  -     Thyroglobulin With Anti-TG; Future  -     Uric Acid; Future  -     Vitamin D 25 Hydroxy; Future  -     Comprehensive Metabolic Panel; Future  -     C-Peptide; Future  -     Hemoglobin A1c; Future  -     Lipid Panel; Future    Hyperuricemia  -     T4 & TSH ([facility]); Future  -     T3, Free; Future  -     T4, Free; Future  -     Thyroglobulin With Anti-TG; Future  -     Uric Acid; Future  -     Vitamin D 25 Hydroxy; Future  -      Comprehensive Metabolic Panel; Future  -     C-Peptide; Future  -     Hemoglobin A1c; Future  -     Lipid Panel; Future    Dyslipidemia  -     T4 & TSH ([facility]); Future  -     T3, Free; Future  -     T4, Free; Future  -     Thyroglobulin With Anti-TG; Future  -     Uric Acid; Future  -     Vitamin D 25 Hydroxy; Future  -     Comprehensive Metabolic Panel; Future  -     C-Peptide; Future  -     Hemoglobin A1c; Future  -     Lipid Panel; Future    Other orders  -     SYNTHROID 125 MCG tablet; Take 1 tablet by mouth Daily.  -     progesterone (PROMETRIUM) 200 MG capsule; Take 1 capsule by mouth Daily.  -     metoprolol succinate XL (TOPROL-XL) 50 MG 24 hr tablet; Take 1 tablet by mouth Daily. For BP and palpitations  -     estradiol (ESTRACE) 1 MG tablet; Take 1 tablet by mouth Daily.  -     atorvastatin (LIPITOR) 10 MG tablet; Take 1 tablet by mouth Daily. For cholesterol  -     allopurinol (ZYLOPRIM) 100 MG tablet; Take 1 tablet by mouth Daily.      Is summary I saw and examined this 69-year-old female for above-mentioned problems.  I reviewed her laboratory evaluations of 1/7/2020 and provided her with a hard copy of it.  She is clinically and metabolically stable and therefore we will go ahead and continue all her current prescriptions.  I will see her in 12 months or sooner if needed with laboratory evaluation prior to each office visit.

## 2020-01-23 ENCOUNTER — APPOINTMENT (OUTPATIENT)
Dept: WOMENS IMAGING | Facility: HOSPITAL | Age: 70
End: 2020-01-23

## 2020-01-23 PROCEDURE — 77063 BREAST TOMOSYNTHESIS BI: CPT | Performed by: RADIOLOGY

## 2020-01-23 PROCEDURE — 77067 SCR MAMMO BI INCL CAD: CPT | Performed by: RADIOLOGY

## 2020-04-06 ENCOUNTER — TELEPHONE (OUTPATIENT)
Dept: ENDOCRINOLOGY | Age: 70
End: 2020-04-06

## 2020-04-30 ENCOUNTER — TELEMEDICINE (OUTPATIENT)
Dept: FAMILY MEDICINE CLINIC | Facility: CLINIC | Age: 70
End: 2020-04-30

## 2020-04-30 DIAGNOSIS — M85.80 OSTEOPENIA, UNSPECIFIED LOCATION: ICD-10-CM

## 2020-04-30 DIAGNOSIS — E55.9 VITAMIN D DEFICIENCY: ICD-10-CM

## 2020-04-30 DIAGNOSIS — E78.5 DYSLIPIDEMIA: ICD-10-CM

## 2020-04-30 DIAGNOSIS — I10 BENIGN ESSENTIAL HYPERTENSION: Primary | ICD-10-CM

## 2020-04-30 DIAGNOSIS — R79.89 ABNORMAL CBC: ICD-10-CM

## 2020-04-30 DIAGNOSIS — Z79.1 NSAID LONG-TERM USE: ICD-10-CM

## 2020-04-30 PROCEDURE — 99214 OFFICE O/P EST MOD 30 MIN: CPT | Performed by: PHYSICIAN ASSISTANT

## 2020-04-30 RX ORDER — METOPROLOL SUCCINATE 50 MG/1
50 TABLET, EXTENDED RELEASE ORAL DAILY
Qty: 90 TABLET | Refills: 3 | Status: SHIPPED | OUTPATIENT
Start: 2020-04-30 | End: 2021-02-23 | Stop reason: SDUPTHER

## 2020-04-30 RX ORDER — TRIAMTERENE AND HYDROCHLOROTHIAZIDE 37.5; 25 MG/1; MG/1
1 CAPSULE ORAL EVERY MORNING
Qty: 90 CAPSULE | Refills: 3 | Status: SHIPPED | OUTPATIENT
Start: 2020-04-30 | End: 2021-01-12

## 2020-04-30 NOTE — PROGRESS NOTES
Subjective   Onelia Alegre is a 69 y.o. female.   You have chosen to receive care through a telehealth visit.  Do you consent to use a video/audio connection for your medical care today? Yes    History of Present Illness    Since the last visit, she has overall felt fairly well.  She has Essential Hypertension and well controlled on current medication, Hyperlipidemia with goals met with current Rx, Hypothyroidism and remains under the care of their Endocrinologist, Seasonal allergies and doing well on their medication  and Vitamin D deficiency and labs are at goal >30 ng/mL.  she has been compliant with current medications have reviewed them.  The patient denies medication side effects.  Will refill medications. LMP  (LMP Unknown)   Sees Dr. Farooq for thyroid and also refilled vitamin D and cholesterol medication  Had knee surgery went well in December wants to stay on Voltaren will need to check renal functions  Saw preop labs with white count 15,000 needs follow-up  Will update B12 folic acid  Bone density and mammogram not due again till January 2021  No recent gout  Needs to start exercise plan  Results for orders placed or performed in visit on 12/16/19   CBC (No Diff)   Result Value Ref Range    WBC 15.98 (H) 3.40 - 10.80 10*3/mm3    RBC 4.36 3.77 - 5.28 10*6/mm3    Hemoglobin 14.3 12.0 - 15.9 g/dL    Hematocrit 42.8 34.0 - 46.6 %    MCV 98.2 (H) 79.0 - 97.0 fL    MCH 32.8 26.6 - 33.0 pg    MCHC 33.4 31.5 - 35.7 g/dL    RDW 12.7 12.3 - 15.4 %    RDW-SD 45.7 37.0 - 54.0 fl    MPV 9.5 6.0 - 12.0 fL    Platelets 341 140 - 450 10*3/mm3   Basic Metabolic Panel   Result Value Ref Range    Glucose 92 65 - 99 mg/dL    BUN 20 8 - 23 mg/dL    Creatinine 0.93 0.57 - 1.00 mg/dL    Sodium 137 136 - 145 mmol/L    Potassium 3.7 3.5 - 5.2 mmol/L    Chloride 98 98 - 107 mmol/L    CO2 26.9 22.0 - 29.0 mmol/L    Calcium 9.7 8.6 - 10.5 mg/dL    eGFR Non African Amer 60 (L) >60 mL/min/1.73    BUN/Creatinine Ratio 21.5 7.0 -  25.0    Anion Gap 12.1 5.0 - 15.0 mmol/L     ;l  Need her to f/u on CBC---d/t WBC, will also update B12, folic acid  She is taking Voltaren 50mg ---BID. Long term use of NSAIDs can lead to heart disease and strokes, as well as GI bleeding/ulcers, and cause kidney disease. Advise labs to monitor renal functions to be done at least every 6 months.    Had knee repair Dr Angel Dec 16.  Need her to restart exercise  Has had one Shingrix vaccine had reaction  The following portions of the patient's history were reviewed and updated as appropriate: allergies, current medications, past family history, past medical history, past social history, past surgical history and problem list.    Review of Systems   Constitutional: Negative for activity change, appetite change and unexpected weight change.   HENT: Negative for nosebleeds and trouble swallowing.    Eyes: Negative for pain and visual disturbance.   Respiratory: Negative for chest tightness, shortness of breath and wheezing.    Cardiovascular: Negative for chest pain and palpitations.   Gastrointestinal: Negative for abdominal pain and blood in stool.   Endocrine: Negative.    Genitourinary: Negative for difficulty urinating and hematuria.   Musculoskeletal: Positive for arthralgias. Negative for joint swelling.   Skin: Negative for color change and rash.   Allergic/Immunologic: Negative.    Neurological: Negative for syncope and speech difficulty.   Hematological: Negative for adenopathy.   Psychiatric/Behavioral: Negative for agitation and confusion.   All other systems reviewed and are negative.      Objective   Physical Exam   Constitutional: She is oriented to person, place, and time. She appears well-developed and well-nourished. No distress.   HENT:   Head: Normocephalic and atraumatic.   Right Ear: External ear normal.   Left Ear: External ear normal.   Eyes: Conjunctivae are normal. Right eye exhibits no discharge. Left eye exhibits no discharge. No scleral  icterus.   Neck: Normal range of motion.   Pulmonary/Chest: Effort normal. No stridor. No respiratory distress.   Abdominal: Soft. There is no guarding.   Musculoskeletal: Normal range of motion.   Neurological: She is alert and oriented to person, place, and time. Coordination normal.   Skin: Skin is dry. She is not diaphoretic.   Psychiatric: She has a normal mood and affect. Her behavior is normal. Judgment and thought content normal.       Assessment/Plan   Onelia was seen today for hypertension.    Diagnoses and all orders for this visit:    Benign essential hypertension  -     CBC & Differential  -     Vitamin B12  -     Folate  -     Basic Metabolic Panel    Vitamin D deficiency  -     CBC & Differential  -     Vitamin B12  -     Folate  -     Basic Metabolic Panel    Dyslipidemia  -     CBC & Differential  -     Vitamin B12  -     Folate  -     Basic Metabolic Panel    Osteopenia, unspecified location  -     CBC & Differential  -     Vitamin B12  -     Folate  -     Basic Metabolic Panel    Abnormal CBC  -     CBC & Differential  -     Vitamin B12  -     Folate  -     Basic Metabolic Panel    NSAID long-term use  -     CBC & Differential  -     Vitamin B12  -     Folate  -     Basic Metabolic Panel    Other orders  -     metoprolol succinate XL (TOPROL-XL) 50 MG 24 hr tablet; Take 1 tablet by mouth Daily. For BP and palpitations  -     triamterene-hydrochlorothiazide (DYAZIDE) 37.5-25 MG per capsule; Take 1 capsule by mouth Every Morning. For BP and edema      Sent Voltaren; Long term use of NSAIDs can lead to heart disease and strokes, as well as GI bleeding/ulcers, and cause kidney disease. Advise labs to monitor renal functions to be done at least every 6 months  Plan, Onelia Alegre, was seen today.  she was seen for HTN and continue medication, Hyperlipidemia and will continue current medication, Hypothyroidism and under the care of Endocrinologist, Seasonal allergies and is doing well on their  medication PRN and Vitamin D deficiency and supplemented.  Need f/u on WBC from Dec; update B12 and folate  Time spent 20 minutes

## 2020-04-30 NOTE — PATIENT INSTRUCTIONS

## 2020-05-11 ENCOUNTER — TELEPHONE (OUTPATIENT)
Dept: ENDOCRINOLOGY | Age: 70
End: 2020-05-11

## 2020-05-12 RX ORDER — ESTRADIOL 1 MG/1
1 TABLET ORAL DAILY
Qty: 90 TABLET | Refills: 3 | Status: SHIPPED | OUTPATIENT
Start: 2020-05-12 | End: 2021-02-02

## 2020-05-12 RX ORDER — ATORVASTATIN CALCIUM 10 MG/1
10 TABLET, FILM COATED ORAL DAILY
Qty: 90 TABLET | Refills: 3 | Status: SHIPPED | OUTPATIENT
Start: 2020-05-12 | End: 2021-02-23 | Stop reason: SDUPTHER

## 2020-05-19 LAB
BASOPHILS # BLD AUTO: 0.1 10*3/MM3 (ref 0–0.2)
BASOPHILS NFR BLD AUTO: 0.9 % (ref 0–1.5)
BUN SERPL-MCNC: 15 MG/DL (ref 8–23)
BUN/CREAT SERPL: 15.6 (ref 7–25)
CALCIUM SERPL-MCNC: 9.7 MG/DL (ref 8.6–10.5)
CHLORIDE SERPL-SCNC: 100 MMOL/L (ref 98–107)
CO2 SERPL-SCNC: 25.9 MMOL/L (ref 22–29)
CREAT SERPL-MCNC: 0.96 MG/DL (ref 0.57–1)
EOSINOPHIL # BLD AUTO: 0.18 10*3/MM3 (ref 0–0.4)
EOSINOPHIL NFR BLD AUTO: 1.6 % (ref 0.3–6.2)
ERYTHROCYTE [DISTWIDTH] IN BLOOD BY AUTOMATED COUNT: 12.2 % (ref 12.3–15.4)
FOLATE SERPL-MCNC: >20 NG/ML (ref 4.78–24.2)
GLUCOSE SERPL-MCNC: 110 MG/DL (ref 65–99)
HCT VFR BLD AUTO: 39.5 % (ref 34–46.6)
HGB BLD-MCNC: 13.5 G/DL (ref 12–15.9)
IMM GRANULOCYTES # BLD AUTO: 0.04 10*3/MM3 (ref 0–0.05)
IMM GRANULOCYTES NFR BLD AUTO: 0.4 % (ref 0–0.5)
LYMPHOCYTES # BLD AUTO: 3.25 10*3/MM3 (ref 0.7–3.1)
LYMPHOCYTES NFR BLD AUTO: 29.4 % (ref 19.6–45.3)
MCH RBC QN AUTO: 33.3 PG (ref 26.6–33)
MCHC RBC AUTO-ENTMCNC: 34.2 G/DL (ref 31.5–35.7)
MCV RBC AUTO: 97.5 FL (ref 79–97)
MONOCYTES # BLD AUTO: 0.7 10*3/MM3 (ref 0.1–0.9)
MONOCYTES NFR BLD AUTO: 6.3 % (ref 5–12)
NEUTROPHILS # BLD AUTO: 6.79 10*3/MM3 (ref 1.7–7)
NEUTROPHILS NFR BLD AUTO: 61.4 % (ref 42.7–76)
NRBC BLD AUTO-RTO: 0.1 /100 WBC (ref 0–0.2)
PLATELET # BLD AUTO: 300 10*3/MM3 (ref 140–450)
POTASSIUM SERPL-SCNC: 4.6 MMOL/L (ref 3.5–5.2)
RBC # BLD AUTO: 4.05 10*6/MM3 (ref 3.77–5.28)
SODIUM SERPL-SCNC: 139 MMOL/L (ref 136–145)
VIT B12 SERPL-MCNC: 391 PG/ML (ref 211–946)
WBC # BLD AUTO: 11.06 10*3/MM3 (ref 3.4–10.8)

## 2020-06-20 ENCOUNTER — RESULTS ENCOUNTER (OUTPATIENT)
Dept: FAMILY MEDICINE CLINIC | Facility: CLINIC | Age: 70
End: 2020-06-20

## 2020-06-20 DIAGNOSIS — Z79.1 NSAID LONG-TERM USE: ICD-10-CM

## 2020-06-20 DIAGNOSIS — R79.89 ABNORMAL CBC: ICD-10-CM

## 2020-06-20 DIAGNOSIS — I10 BENIGN ESSENTIAL HYPERTENSION: ICD-10-CM

## 2020-06-24 LAB
BASOPHILS # BLD AUTO: 0.08 10*3/MM3 (ref 0–0.2)
BASOPHILS NFR BLD AUTO: 0.8 % (ref 0–1.5)
BUN SERPL-MCNC: 17 MG/DL (ref 8–23)
BUN/CREAT SERPL: 20 (ref 7–25)
CALCIUM SERPL-MCNC: 9.9 MG/DL (ref 8.6–10.5)
CHLORIDE SERPL-SCNC: 100 MMOL/L (ref 98–107)
CO2 SERPL-SCNC: 25.8 MMOL/L (ref 22–29)
CREAT SERPL-MCNC: 0.85 MG/DL (ref 0.57–1)
CRP SERPL-MCNC: 0.55 MG/DL (ref 0–0.5)
EOSINOPHIL # BLD AUTO: 0.2 10*3/MM3 (ref 0–0.4)
EOSINOPHIL NFR BLD AUTO: 1.9 % (ref 0.3–6.2)
ERYTHROCYTE [DISTWIDTH] IN BLOOD BY AUTOMATED COUNT: 12.1 % (ref 12.3–15.4)
ERYTHROCYTE [SEDIMENTATION RATE] IN BLOOD BY WESTERGREN METHOD: 9 MM/HR (ref 0–30)
GLUCOSE SERPL-MCNC: 105 MG/DL (ref 65–99)
HCT VFR BLD AUTO: 40.7 % (ref 34–46.6)
HGB BLD-MCNC: 14.1 G/DL (ref 12–15.9)
IMM GRANULOCYTES # BLD AUTO: 0.03 10*3/MM3 (ref 0–0.05)
IMM GRANULOCYTES NFR BLD AUTO: 0.3 % (ref 0–0.5)
LYMPHOCYTES # BLD AUTO: 3.16 10*3/MM3 (ref 0.7–3.1)
LYMPHOCYTES NFR BLD AUTO: 30.5 % (ref 19.6–45.3)
MCH RBC QN AUTO: 33.7 PG (ref 26.6–33)
MCHC RBC AUTO-ENTMCNC: 34.6 G/DL (ref 31.5–35.7)
MCV RBC AUTO: 97.1 FL (ref 79–97)
MONOCYTES # BLD AUTO: 0.73 10*3/MM3 (ref 0.1–0.9)
MONOCYTES NFR BLD AUTO: 7 % (ref 5–12)
NEUTROPHILS # BLD AUTO: 6.16 10*3/MM3 (ref 1.7–7)
NEUTROPHILS NFR BLD AUTO: 59.5 % (ref 42.7–76)
NRBC BLD AUTO-RTO: 0 /100 WBC (ref 0–0.2)
PLATELET # BLD AUTO: 302 10*3/MM3 (ref 140–450)
POTASSIUM SERPL-SCNC: 4.6 MMOL/L (ref 3.5–5.2)
RBC # BLD AUTO: 4.19 10*6/MM3 (ref 3.77–5.28)
SODIUM SERPL-SCNC: 137 MMOL/L (ref 136–145)
WBC # BLD AUTO: 10.36 10*3/MM3 (ref 3.4–10.8)

## 2020-06-26 ENCOUNTER — OFFICE VISIT (OUTPATIENT)
Dept: FAMILY MEDICINE CLINIC | Facility: CLINIC | Age: 70
End: 2020-06-26

## 2020-06-26 VITALS
WEIGHT: 189 LBS | BODY MASS INDEX: 30.37 KG/M2 | HEIGHT: 66 IN | SYSTOLIC BLOOD PRESSURE: 120 MMHG | HEART RATE: 87 BPM | OXYGEN SATURATION: 97 % | DIASTOLIC BLOOD PRESSURE: 70 MMHG | RESPIRATION RATE: 14 BRPM | TEMPERATURE: 97.3 F

## 2020-06-26 DIAGNOSIS — R79.89 ABNORMAL CBC: ICD-10-CM

## 2020-06-26 DIAGNOSIS — T14.8XXA BRUISING: ICD-10-CM

## 2020-06-26 DIAGNOSIS — R60.0 LOCALIZED EDEMA: Primary | ICD-10-CM

## 2020-06-26 LAB
ALBUMIN SERPL-MCNC: 4.3 G/DL (ref 3.5–5.2)
ALP SERPL-CCNC: 66 U/L (ref 39–117)
ALT SERPL-CCNC: 13 U/L (ref 1–33)
AST SERPL-CCNC: 19 U/L (ref 1–32)
BILIRUB DIRECT SERPL-MCNC: <0.2 MG/DL (ref 0.2–0.3)
BILIRUB SERPL-MCNC: 0.5 MG/DL (ref 0.2–1.2)
Lab: NORMAL
PROT SERPL-MCNC: 7.1 G/DL (ref 6–8.5)
SPECIMEN STATUS: NORMAL
WRITTEN AUTHORIZATION: NORMAL

## 2020-06-26 PROCEDURE — 96160 PT-FOCUSED HLTH RISK ASSMT: CPT | Performed by: PHYSICIAN ASSISTANT

## 2020-06-26 PROCEDURE — G0439 PPPS, SUBSEQ VISIT: HCPCS | Performed by: PHYSICIAN ASSISTANT

## 2020-06-26 RX ORDER — LANOLIN ALCOHOL/MO/W.PET/CERES
1000 CREAM (GRAM) TOPICAL DAILY
COMMUNITY

## 2020-06-26 NOTE — PROGRESS NOTES
The ABCs of the Annual Wellness Visit  Subsequent Medicare Wellness Visit    Chief Complaint   Patient presents with   • Foot Swelling       Subjective   History of Present Illness:  Onelia Alegre is a 69 y.o. female who presents for a Subsequent Medicare Wellness Visit.    HEALTH RISK ASSESSMENT    Recent Hospitalizations:  No hospitalization(s) within the last year.    Current Medical Providers:  Patient Care Team:  Raven Travis PA-C as PCP - General (Family Medicine)  Pat Angel MD as Consulting Physician (Orthopedic Surgery)  Lew Farooq MD as Consulting Physician (Endocrinology)    Smoking Status:  Social History     Tobacco Use   Smoking Status Never Smoker   Smokeless Tobacco Never Used       Alcohol Consumption:  Social History     Substance and Sexual Activity   Alcohol Use Yes    Comment: social       Depression Screen:   PHQ-2/PHQ-9 Depression Screening 3/28/2019   Little interest or pleasure in doing things 0   Feeling down, depressed, or hopeless 0   Total Score 0       Fall Risk Screen:  STEADI Fall Risk Assessment has not been completed.    Health Habits and Functional and Cognitive Screening:  Functional & Cognitive Status 9/27/2018   Do you have difficulty preparing food and eating? No   Do you have difficulty bathing yourself, getting dressed or grooming yourself? No   Do you have difficulty using the toilet? No   Do you have difficulty moving around from place to place? No   Do you have trouble with steps or getting out of a bed or a chair? No   Current Diet Well Balanced Diet   Dental Exam Up to date   Eye Exam Up to date   Exercise (times per week) 3 times per week   Current Exercise Activities Include Walking   Do you need help using the phone?  No   Are you deaf or do you have serious difficulty hearing?  No   Do you need help with transportation? No   Do you need help shopping? No   Do you need help preparing meals?  No   Do you need help with housework?  No   Do you need  help with laundry? No   Do you need help taking your medications? No   Do you need help managing money? No   Do you ever drive or ride in a car without wearing a seat belt? No   Have you felt unusual stress, anger or loneliness in the last month? No   Who do you live with? Spouse   If you need help, do you have trouble finding someone available to you? No   Have you been bothered in the last four weeks by sexual problems? No   Do you have difficulty concentrating, remembering or making decisions? No         Does the patient have evidence of cognitive impairment? No    Asprin use counseling:Does not need ASA (and currently is not on it)    Age-appropriate Screening Schedule:  Refer to the list below for future screening recommendations based on patient's age, sex and/or medical conditions. Orders for these recommended tests are listed in the plan section. The patient has been provided with a written plan.    Health Maintenance   Topic Date Due   • ZOSTER VACCINE (3 of 3) 05/09/2021 (Originally 12/11/2018)   • INFLUENZA VACCINE  08/01/2020   • LIPID PANEL  01/07/2021   • DXA SCAN  01/07/2021   • MAMMOGRAM  01/23/2021   • COLONOSCOPY  04/27/2023   • TDAP/TD VACCINES (3 - Td) 12/07/2026          The following portions of the patient's history were reviewed and updated as appropriate: allergies, current medications, past family history, past medical history, past social history, past surgical history and problem list.    Outpatient Medications Prior to Visit   Medication Sig Dispense Refill   • allopurinol (ZYLOPRIM) 100 MG tablet Take 1 tablet by mouth Daily. 90 tablet 3   • atorvastatin (Lipitor) 10 MG tablet Take 1 tablet by mouth Daily. For cholesterol 90 tablet 3   • Calcium Carbonate-Vitamin D (CALCIUM-VITAMIN D) 600-200 MG-UNIT capsule Take by mouth.     • cetirizine (ZyrTEC) 10 MG tablet Take 10 mg by mouth daily. Take one tablet daily as needed     • cholecalciferol (VITAMIN D3) 1000 UNITS tablet Take 2,000  "Units by mouth Daily.     • estradiol (Estrace) 1 MG tablet Take 1 tablet by mouth Daily. 90 tablet 3   • melatonin tablet Take by mouth.     • metoprolol succinate XL (TOPROL-XL) 50 MG 24 hr tablet Take 1 tablet by mouth Daily. For BP and palpitations 90 tablet 3   • Multiple Vitamin (MULTIVITAMIN) tablet Take 1 tablet by mouth daily.     • progesterone (PROMETRIUM) 200 MG capsule Take 1 capsule by mouth Daily. 90 capsule 3   • SYNTHROID 125 MCG tablet Take 1 tablet by mouth Daily. 90 tablet 3   • triamterene-hydrochlorothiazide (DYAZIDE) 37.5-25 MG per capsule Take 1 capsule by mouth Every Morning. For BP and edema 90 capsule 3   • Diclofenac Sodium (VOLTAREN PO) Take 50 mg by mouth 2 (Two) Times a Day.       No facility-administered medications prior to visit.        Patient Active Problem List   Diagnosis   • Benign essential hypertension   • Osteopenia   • Excess weight   • Localized edema   • Thyroid function test abnormal   • Primary hypothyroidism   • Vitamin D deficiency   • Dyslipidemia   • Hyperuricemia   • Symptomatic menopausal or female climacteric states   • Renal insufficiency   • Palpitation       Advanced Care Planning:  ACP discussion was held with the patient during this visit. Patient does not have an advance directive, information provided.    Review of Systems    Compared to one year ago, the patient feels her physical health is the same.  Compared to one year ago, the patient feels her mental health is the same.    Reviewed chart for potential of high risk medication in the elderly: yes  Reviewed chart for potential of harmful drug interactions in the elderly:yes    Objective         Vitals:    06/26/20 1011   Height: 167.6 cm (66\")       Body mass index is 29.8 kg/m².  Discussed the patient's BMI with her. The BMI is above average; BMI management plan is completed.    Physical Exam    Lab Results   Component Value Date     (H) 06/24/2020        Assessment/Plan   Medicare Risks and " Personalized Health Plan  CMS Preventative Services Quick Reference  Immunizations Discussed/Encouraged (specific immunizations; Shingrix )    The above risks/problems have been discussed with the patient.  Pertinent information has been shared with the patient in the After Visit Summary.  Follow up plans and orders are seen below in the Assessment/Plan Section.    There are no diagnoses linked to this encounter.  Follow Up:  No follow-ups on file.     An After Visit Summary and PPPS were given to the patient.

## 2020-06-26 NOTE — PATIENT INSTRUCTIONS
Medicare Wellness  Personal Prevention Plan of Service     Date of Office Visit:  2020  Encounter Provider:  Raven Travis PA-C  Place of Service:  [facility]  Patient Name: Onelia Alegre  :  1950    As part of the Medicare Wellness portion of your visit today, we are providing you with this personalized preventive plan of services (PPPS). This plan is based upon recommendations of the [place] Preventive Services Task Force (USPSTF) and the Advisory Committee on Immunization Practices (ACIP).    This lists the preventive care services that should be considered, and provides dates of when you are due. Items listed as completed are up-to-date and do not require any further intervention.    Health Maintenance   Topic Date Due   • ZOSTER VACCINE (3 of 3) 2021 (Originally 2018)   • INFLUENZA VACCINE  2020   • LIPID PANEL  2021   • DXA SCAN  2021   • MAMMOGRAM  2021   • MEDICARE ANNUAL WELLNESS  2021   • COLONOSCOPY  2023   • TDAP/TD VACCINES (3 - Td) 2026   • Pneumococcal Vaccine Once at 65 Years Old  Completed   • HEPATITIS C SCREENING  Addressed       No orders of the defined types were placed in this encounter.      Return if symptoms worsen or fail to improve.          Fall Prevention in the Home, Adult  Falls can cause injuries and can affect people from all age groups. There are many simple things that you can do to make your home safe and to help prevent falls. Ask for help when making these changes, if needed.  What actions can I take to prevent falls?  General instructions  · Use good lighting in all rooms. Replace any light bulbs that burn out.  · Turn on lights if it is dark. Use night-lights.  · Place frequently used items in easy-to-reach places. Lower the shelves around your home if necessary.  · Set up furniture so that there are clear paths around it. Avoid moving your furniture  around.  · Remove throw rugs and other tripping hazards from the floor.  · Avoid walking on wet floors.  · Fix any uneven floor surfaces.  · Add color or contrast paint or tape to grab bars and handrails in your home. Place contrasting color strips on the first and last steps of stairways.  · When you use a stepladder, make sure that it is completely opened and that the sides are firmly locked. Have someone hold the ladder while you are using it. Do not climb a closed stepladder.  · Be aware of any and all pets.  What can I do in the bathroom?         · Keep the floor dry. Immediately clean up any water that spills onto the floor.  · Remove soap buildup in the tub or shower on a regular basis.  · Use non-skid mats or decals on the floor of the tub or shower.  · Attach bath mats securely with double-sided, non-slip rug tape.  · If you need to sit down while you are in the shower, use a plastic, non-slip stool.  · Install grab bars by the toilet and in the tub and shower. Do not use towel bars as grab bars.  What can I do in the bedroom?  · Make sure that a bedside light is easy to reach.  · Do not use oversized bedding that drapes onto the floor.  · Have a firm chair that has side arms to use for getting dressed.  What can I do in the kitchen?  · Clean up any spills right away.  · If you need to reach for something above you, use a sturdy step stool that has a grab bar.  · Keep electrical cables out of the way.  · Do not use floor polish or wax that makes floors slippery. If you must use wax, make sure that it is non-skid floor wax.  What can I do in the stairways?  · Do not leave any items on the stairs.  · Make sure that you have a light switch at the top of the stairs and the bottom of the stairs. Have them installed if you do not have them.  · Make sure that there are handrails on both sides of the stairs. Fix handrails that are broken or loose. Make sure that handrails are as long as the stairways.  · Install  non-slip stair treads on all stairs in your home.  · Avoid having throw rugs at the top or bottom of stairways, or secure the rugs with carpet tape to prevent them from moving.  · Choose a carpet design that does not hide the edge of steps on the stairway.  · Check any carpeting to make sure that it is firmly attached to the stairs. Fix any carpet that is loose or worn.  What can I do on the outside of my home?  · Use bright outdoor lighting.  · Regularly repair the edges of walkways and driveways and fix any cracks.  · Remove high doorway thresholds.  · Trim any shrubbery on the main path into your home.  · Regularly check that handrails are securely fastened and in good repair. Both sides of any steps should have handrails.  · Install guardrails along the edges of any raised decks or porches.  · Clear walkways of debris and clutter, including tools and rocks.  · Have leaves, snow, and ice cleared regularly.  · Use sand or salt on walkways during winter months.  · In the garage, clean up any spills right away, including grease or oil spills.  What other actions can I take?  · Wear closed-toe shoes that fit well and support your feet. Wear shoes that have rubber soles or low heels.  · Use mobility aids as needed, such as canes, walkers, scooters, and crutches.  · Review your medicines with your health care provider. Some medicines can cause dizziness or changes in blood pressure, which increase your risk of falling.  Talk with your health care provider about other ways that you can decrease your risk of falls. This may include working with a physical therapist or  to improve your strength, balance, and endurance.  Where to find more information  · Centers for Disease Control and Prevention, STEADI: https://www.cdc.gov  · [place]: https://ts4xxug.frankie.nih.gov  Contact a health care provider if:  · You are afraid of falling at home.  · You feel weak, drowsy, or dizzy at home.  · You fall at  home.  Summary  · There are many simple things that you can do to make your home safe and to help prevent falls.  · Ways to make your home safe include removing tripping hazards and installing grab bars in the bathroom.  · Ask for help when making these changes in your home.  This information is not intended to replace advice given to you by your health care provider. Make sure you discuss any questions you have with your health care provider.  Document Released: 12/08/2003 Document Revised: 11/30/2018 Document Reviewed: 08/02/2018  Elsevier Patient Education © 2020 [employer]

## 2020-06-26 NOTE — PROGRESS NOTES
"Subjective   Onelia Alegre is a 69 y.o. female.     History of Present Illness   Onelia Alegre 69 y.o. female /70   Pulse 87   Temp 97.3 °F (36.3 °C) (Skin)   Resp 14   Ht 167.6 cm (66\")   Wt 85.7 kg (189 lb)   LMP  (LMP Unknown)   SpO2 97%   BMI 30.51 kg/m²  who presents today for bruising. Mostly on arms; more in last few weeks. Did take trip and edema of feet----last week. taking her Lasix and helping. I want her to get lower ext compression stockings--knee high. This is on arms only. No bleeding gums. she had recent bruise from sitting in chair. Outline of chair.  Not on ASA or NSAIDs. Not on SSRI.      sshe has a history of   Patient Active Problem List   Diagnosis   • Benign essential hypertension   • Osteopenia   • Excess weight   • Localized edema   • Thyroid function test abnormal   • Primary hypothyroidism   • Vitamin D deficiency   • Dyslipidemia   • Hyperuricemia   • Symptomatic menopausal or female climacteric states   • Renal insufficiency   • Palpitation   .    I did just f/u on elevated WBC and d/w DR Lentz; added labs for inflammation and slight increase in CRP; WBC down now.  Stop Turmeric d/t bruising  No NSAIDs    The following portions of the patient's history were reviewed and updated as appropriate: allergies, current medications, past family history, past medical history, past social history, past surgical history and problem list.    Review of Systems   Constitutional: Negative for activity change, appetite change, fever and unexpected weight change.   HENT: Negative for nosebleeds and trouble swallowing.    Eyes: Negative for pain and visual disturbance.   Respiratory: Negative for chest tightness, shortness of breath and wheezing.    Cardiovascular: Positive for leg swelling. Negative for chest pain and palpitations.   Gastrointestinal: Negative for abdominal pain and blood in stool.   Endocrine: Negative.    Genitourinary: Negative for difficulty urinating and " hematuria.   Musculoskeletal: Positive for arthralgias. Negative for joint swelling.   Skin: Negative for color change and rash.   Allergic/Immunologic: Negative.    Neurological: Negative for syncope and speech difficulty.   Hematological: Negative for adenopathy.   Psychiatric/Behavioral: Negative for agitation and confusion.   All other systems reviewed and are negative.      Objective   Physical Exam   Constitutional: She is oriented to person, place, and time. She appears well-developed and well-nourished. No distress.   HENT:   Head: Normocephalic and atraumatic.   Eyes: Pupils are equal, round, and reactive to light. Conjunctivae and EOM are normal. Right eye exhibits no discharge. Left eye exhibits no discharge. No scleral icterus.   Neck: Normal range of motion. Neck supple. No tracheal deviation present. No thyromegaly present.   Cardiovascular: Normal rate, regular rhythm, normal heart sounds, intact distal pulses and normal pulses. Exam reveals no gallop.   No murmur heard.  Trace pedal edema = bilat     Pulmonary/Chest: Effort normal and breath sounds normal. No respiratory distress. She has no wheezes. She has no rales.   Musculoskeletal: Normal range of motion.   Neurological: She is alert and oriented to person, place, and time. She exhibits normal muscle tone. Coordination normal.   Skin: Skin is warm. No rash noted. No erythema. No pallor.   Bruise on arms only--see photo   Psychiatric: She has a normal mood and affect. Her behavior is normal. Judgment and thought content normal.   Nursing note and vitals reviewed.      Assessment/Plan   Onelia was seen today for foot swelling and medicare wellness-subsequent.    Diagnoses and all orders for this visit:    Localized edema    Abnormal CBC    Bruising      See if can add INR and PTT  Wear knee high stockings on trips  Bruising on arms--just had labs f/u elevated WBC--better; plt in range--still concern and will add hepatic panel  Can take water pill  PRN  Slight increase in CRP lab         Answers for HPI/ROS submitted by the patient on 6/24/2020   What is the primary reason for your visit?: Other  Please describe your symptoms.: I have been bleeding under the skin on my arms a lot & not even having bruised myself., And, While on vacation last week - my ankles swelled 2 to 3 times larger by end of day. (I have done this before for one night in the past if I had flown but this trip was only an 1 1/2 away that I rode in a car.) I took an extra water pill but it did not help.  Have you had these symptoms before?: Yes  How long have you been having these symptoms?: Greater than 2 weeks

## 2020-06-28 DIAGNOSIS — T14.8XXA BRUISING: ICD-10-CM

## 2020-06-28 DIAGNOSIS — R79.89 ABNORMAL CBC: Primary | ICD-10-CM

## 2020-06-30 RX ORDER — LEVOTHYROXINE SODIUM 125 MCG
125 TABLET ORAL DAILY
Qty: 90 TABLET | Refills: 3 | Status: SHIPPED | OUTPATIENT
Start: 2020-06-30 | End: 2021-01-21

## 2020-07-02 LAB
APTT PPP: 26 SECONDS (ref 22.7–35.4)
INR PPP: 1.02 (ref 0.9–1.1)
PROTHROMBIN TIME: 13.3 SECONDS (ref 11.7–14.2)

## 2020-07-29 ENCOUNTER — TELEPHONE (OUTPATIENT)
Dept: ENDOCRINOLOGY | Age: 70
End: 2020-07-29

## 2020-07-29 NOTE — TELEPHONE ENCOUNTER
Pt called in and needs refill for   allopurinol (ZYLOPRIM) 100 MG tablet [310]      Please send to [facility] pharmacy  [facility] - [place], [place] - [address] - [phone]  - [phone] FX Phone:  [phone] Fax:  [phone]    Address:  [address], [facility] [address]            If any questions please call the pt @ [phone]

## 2020-07-30 RX ORDER — ALLOPURINOL 100 MG/1
100 TABLET ORAL DAILY
Qty: 90 TABLET | Refills: 1 | Status: SHIPPED | OUTPATIENT
Start: 2020-07-30 | End: 2021-02-02

## 2020-12-07 RX ORDER — ALLOPURINOL 100 MG/1
TABLET ORAL
Qty: 90 TABLET | Refills: 0 | OUTPATIENT
Start: 2020-12-07

## 2021-01-07 ENCOUNTER — LAB (OUTPATIENT)
Dept: ENDOCRINOLOGY | Age: 71
End: 2021-01-07

## 2021-01-07 DIAGNOSIS — E03.9 PRIMARY HYPOTHYROIDISM: ICD-10-CM

## 2021-01-07 DIAGNOSIS — E78.5 DYSLIPIDEMIA: ICD-10-CM

## 2021-01-07 DIAGNOSIS — E55.9 VITAMIN D DEFICIENCY: ICD-10-CM

## 2021-01-07 DIAGNOSIS — R94.6 THYROID FUNCTION TEST ABNORMAL: ICD-10-CM

## 2021-01-07 DIAGNOSIS — E79.0 HYPERURICEMIA: ICD-10-CM

## 2021-01-07 DIAGNOSIS — I10 BENIGN ESSENTIAL HYPERTENSION: Primary | ICD-10-CM

## 2021-01-07 DIAGNOSIS — I10 BENIGN ESSENTIAL HYPERTENSION: ICD-10-CM

## 2021-01-08 LAB
25(OH)D3+25(OH)D2 SERPL-MCNC: 80.8 NG/ML (ref 30–100)
ALBUMIN SERPL-MCNC: 4.3 G/DL (ref 3.5–5.2)
ALBUMIN/GLOB SERPL: 1.7 G/DL
ALP SERPL-CCNC: 76 U/L (ref 39–117)
ALT SERPL-CCNC: 14 U/L (ref 1–33)
AST SERPL-CCNC: 15 U/L (ref 1–32)
BILIRUB SERPL-MCNC: 1.2 MG/DL (ref 0–1.2)
BUN SERPL-MCNC: 20 MG/DL (ref 8–23)
BUN/CREAT SERPL: 23.5 (ref 7–25)
CALCIUM SERPL-MCNC: 10 MG/DL (ref 8.6–10.5)
CHLORIDE SERPL-SCNC: 99 MMOL/L (ref 98–107)
CHOLEST SERPL-MCNC: 151 MG/DL (ref 0–200)
CO2 SERPL-SCNC: 26.8 MMOL/L (ref 22–29)
CREAT SERPL-MCNC: 0.85 MG/DL (ref 0.57–1)
GLOBULIN SER CALC-MCNC: 2.5 GM/DL
GLUCOSE SERPL-MCNC: 90 MG/DL (ref 65–99)
HDLC SERPL-MCNC: 56 MG/DL (ref 40–60)
INTERPRETATION: NORMAL
LDLC SERPL CALC-MCNC: 73 MG/DL (ref 0–100)
POTASSIUM SERPL-SCNC: 4.3 MMOL/L (ref 3.5–5.2)
PROT SERPL-MCNC: 6.8 G/DL (ref 6–8.5)
SODIUM SERPL-SCNC: 137 MMOL/L (ref 136–145)
T3FREE SERPL-MCNC: 2.5 PG/ML (ref 2–4.4)
T4 FREE SERPL-MCNC: 2.29 NG/DL (ref 0.93–1.7)
T4 SERPL-MCNC: 13.4 MCG/DL (ref 4.5–11.7)
TRIGL SERPL-MCNC: 122 MG/DL (ref 0–150)
TSH SERPL DL<=0.005 MIU/L-ACNC: 0.71 UIU/ML (ref 0.27–4.2)
VLDLC SERPL CALC-MCNC: 22 MG/DL (ref 5–40)

## 2021-01-12 ENCOUNTER — OFFICE VISIT (OUTPATIENT)
Dept: FAMILY MEDICINE CLINIC | Facility: CLINIC | Age: 71
End: 2021-01-12

## 2021-01-12 VITALS
RESPIRATION RATE: 16 BRPM | BODY MASS INDEX: 28.12 KG/M2 | DIASTOLIC BLOOD PRESSURE: 90 MMHG | HEIGHT: 66 IN | OXYGEN SATURATION: 100 % | HEART RATE: 80 BPM | TEMPERATURE: 97.5 F | SYSTOLIC BLOOD PRESSURE: 144 MMHG | WEIGHT: 175 LBS

## 2021-01-12 DIAGNOSIS — R07.89 OTHER CHEST PAIN: ICD-10-CM

## 2021-01-12 DIAGNOSIS — I10 BENIGN ESSENTIAL HYPERTENSION: ICD-10-CM

## 2021-01-12 DIAGNOSIS — T14.8XXA BRUISING: ICD-10-CM

## 2021-01-12 DIAGNOSIS — R79.89 ABNORMAL CBC: ICD-10-CM

## 2021-01-12 DIAGNOSIS — Z23 IMMUNIZATION DUE: Primary | ICD-10-CM

## 2021-01-12 DIAGNOSIS — Z12.31 ENCOUNTER FOR SCREENING MAMMOGRAM FOR BREAST CANCER: ICD-10-CM

## 2021-01-12 DIAGNOSIS — Z78.0 POST-MENOPAUSAL: ICD-10-CM

## 2021-01-12 DIAGNOSIS — R00.2 PALPITATIONS: ICD-10-CM

## 2021-01-12 PROCEDURE — G0009 ADMIN PNEUMOCOCCAL VACCINE: HCPCS | Performed by: PHYSICIAN ASSISTANT

## 2021-01-12 PROCEDURE — 90732 PPSV23 VACC 2 YRS+ SUBQ/IM: CPT | Performed by: PHYSICIAN ASSISTANT

## 2021-01-12 PROCEDURE — 93000 ELECTROCARDIOGRAM COMPLETE: CPT | Performed by: PHYSICIAN ASSISTANT

## 2021-01-12 PROCEDURE — 99214 OFFICE O/P EST MOD 30 MIN: CPT | Performed by: PHYSICIAN ASSISTANT

## 2021-01-12 RX ORDER — HYDROCHLOROTHIAZIDE 25 MG/1
25 TABLET ORAL DAILY
Qty: 30 TABLET | Refills: 0 | Status: SHIPPED | OUTPATIENT
Start: 2021-01-12 | End: 2021-02-02 | Stop reason: SDUPTHER

## 2021-01-12 RX ORDER — LISINOPRIL 5 MG/1
5 TABLET ORAL DAILY
Qty: 30 TABLET | Refills: 0 | Status: SHIPPED | OUTPATIENT
Start: 2021-01-12 | End: 2021-02-02

## 2021-01-12 NOTE — PROGRESS NOTES
Procedure     ECG 12 Lead    Date/Time: 1/12/2021 10:58 AM  Performed by: Raven Travis PA-C  Authorized by: Raven Travis PA-C   Comparison: compared with previous ECG from 12/16/2019  Similar to previous ECG  Rhythm: sinus rhythm  Rate: normal  BPM: 90  Conduction: conduction normal  ST Segments: ST segments normal  T Waves: T waves normal  QRS axis: left  Other: no other findings    Clinical impression: normal ECG  Comments: EKG Interpretation Report    Heart rate:    90 beats/min, KS interval:  136 msec, QRS duration:  92 msec  QTu:370 msec, QTc:  453 msec               Answers for HPI/ROS submitted by the patient on 1/10/2021   What is the primary reason for your visit?: Physical

## 2021-01-12 NOTE — PROGRESS NOTES
"Subjective   Onelia Alegre is a 70 y.o. female.     History of Present Illness    Since the last visit, she has overall felt well.  Here for med refills but also here for chest pain on Sunday., Wants to make sure not cardiac.  She has Essential Hypertension not at goal, plan to add/adjust medication, Hyperlipidemia with goals met with current Rx, Hypothyroidism and remains under the care of their Endocrinologist and Vitamin D deficiency and labs are at goal >30 ng/mL.  she has been compliant with current medications have reviewed them.  The patient denies medication side effects.  Will refill medications. /80 (BP Location: Left arm, Patient Position: Sitting, Cuff Size: Adult)   Pulse 80   Temp 97.5 °F (36.4 °C)   Resp 16   Ht 167.6 cm (65.98\")   Wt 79.4 kg (175 lb)   LMP  (LMP Unknown)   SpO2 100%   BMI 28.26 kg/m²   Had episode of chest pain.  Onset was Sunday night 1 hr 30 min.  Chest wall to left breast to back; did have SOA; no sweats; lightheaded  Aunts (m)---CAD 40s and 50s.  On beta blocker for palpitations  Need f/u lymphocytes and update B12 and folate  Down 14 lbs  Results for orders placed or performed in visit on 01/07/21   Vitamin D 25 Hydroxy    Specimen: Blood   Result Value Ref Range    25 Hydroxy, Vitamin D 80.8 30.0 - 100.0 ng/ml   T4, Free    Specimen: Blood   Result Value Ref Range    Free T4 2.29 (H) 0.93 - 1.70 ng/dL   T4 & TSH ([facility])    Specimen: Blood   Result Value Ref Range    TSH 0.706 0.270 - 4.200 uIU/mL    T4, Total 13.40 (H) 4.50 - 11.70 mcg/dL   T3, Free    Specimen: Blood   Result Value Ref Range    T3, Free 2.5 2.0 - 4.4 pg/mL   Lipid Panel    Specimen: Blood   Result Value Ref Range    Total Cholesterol 151 0 - 200 mg/dL    Triglycerides 122 0 - 150 mg/dL    HDL Cholesterol 56 40 - 60 mg/dL    VLDL Cholesterol Tevin 22 5 - 40 mg/dL    LDL Chol Calc ([facility]) 73 0 - 100 mg/dL   Comprehensive Metabolic Panel    Specimen: Blood   Result Value Ref Range    Glucose 90 " 65 - 99 mg/dL    BUN 20 8 - 23 mg/dL    Creatinine 0.85 0.57 - 1.00 mg/dL    eGFR Non African Am 66 >60 mL/min/1.73    eGFR African Am 80 >60 mL/min/1.73    BUN/Creatinine Ratio 23.5 7.0 - 25.0    Sodium 137 136 - 145 mmol/L    Potassium 4.3 3.5 - 5.2 mmol/L    Chloride 99 98 - 107 mmol/L    Total CO2 26.8 22.0 - 29.0 mmol/L    Calcium 10.0 8.6 - 10.5 mg/dL    Total Protein 6.8 6.0 - 8.5 g/dL    Albumin 4.30 3.50 - 5.20 g/dL    Globulin 2.5 gm/dL    A/G Ratio 1.7 g/dL    Total Bilirubin 1.2 0.0 - 1.2 mg/dL    Alkaline Phosphatase 76 39 - 117 U/L    AST (SGOT) 15 1 - 32 U/L    ALT (SGPT) 14 1 - 33 U/L   Cardiovascular Risk Assessment   Result Value Ref Range    Interpretation Note      Some cough on and off--declines CXR      The following portions of the patient's history were reviewed and updated as appropriate: allergies, current medications, past family history, past medical history, past social history, past surgical history and problem list.    Review of Systems   Constitutional: Negative for activity change, appetite change, fever and unexpected weight change.   HENT: Negative for nosebleeds and trouble swallowing.    Eyes: Negative for pain and visual disturbance.   Respiratory: Positive for shortness of breath. Negative for chest tightness and wheezing.    Cardiovascular: Positive for chest pain and palpitations.   Gastrointestinal: Negative for abdominal pain and blood in stool.   Endocrine: Negative.    Genitourinary: Negative for difficulty urinating and hematuria.   Musculoskeletal: Negative for joint swelling.   Skin: Negative for color change and rash.   Allergic/Immunologic: Negative.    Neurological: Negative for syncope and speech difficulty.   Hematological: Negative for adenopathy.   Psychiatric/Behavioral: Negative for agitation, confusion and dysphoric mood.   All other systems reviewed and are negative.      Objective   Physical Exam  Vitals signs and nursing note reviewed.   Constitutional:        General: She is not in acute distress.     Appearance: She is well-developed. She is not ill-appearing, toxic-appearing or diaphoretic.   HENT:      Head: Normocephalic.      Right Ear: External ear normal.      Left Ear: External ear normal.      Nose: Nose normal.      Mouth/Throat:      Pharynx: Oropharynx is clear.   Eyes:      General: No scleral icterus.     Conjunctiva/sclera: Conjunctivae normal.      Pupils: Pupils are equal, round, and reactive to light.   Neck:      Musculoskeletal: Normal range of motion and neck supple.      Thyroid: No thyromegaly.      Vascular: No carotid bruit.   Cardiovascular:      Rate and Rhythm: Normal rate and regular rhythm.      Heart sounds: Normal heart sounds. No murmur.   Pulmonary:      Effort: Pulmonary effort is normal. No respiratory distress.      Breath sounds: Normal breath sounds. No rales.   Musculoskeletal: Normal range of motion.         General: No deformity.   Skin:     General: Skin is warm and dry.      Coloration: Skin is not jaundiced.      Findings: No rash.   Neurological:      General: No focal deficit present.      Mental Status: She is alert and oriented to person, place, and time. Mental status is at baseline.      Gait: Gait normal.   Psychiatric:         Mood and Affect: Mood normal.         Behavior: Behavior normal.         Thought Content: Thought content normal.         Judgment: Judgment normal.         Assessment/Plan   Diagnoses and all orders for this visit:    1. Immunization due (Primary)  -     Pneumococcal Polysaccharide Vaccine 23-Valent Greater Than or Equal To 1yo Subcutaneous / IM    2. Benign essential hypertension    3. Palpitations  -     Ambulatory Referral to Cardiology    4. Other chest pain  -     Ambulatory Referral to Cardiology    5. Post-menopausal  -     DEXA Bone Density Axial; Future    6. Encounter for screening mammogram for breast cancer  -     Mammo Screening Digital Tomosynthesis Bilateral With CAD;  Future      EKG  mammo and DEXA screen  CBC---f/u lymphs and update B12 and folate  Refer cardio--chest pain and hx palp.----no prior echo  Plan, Onelia Alegre, was seen today.  she was seen for HTN and add/adjust medication, Hyperlipidemia and will continue current medication, Hypothyroidism and under the care of Endocrinologist and Vitamin D deficiency and supplemented.  EKG d/t chest pain Sunday and no Q waves or ST wave changes; still see cardiologist  F/u endocrine as planned  Cough and declines CXR and work up-not constant and is with PND--rec work up--declines  See me to f/u bp 1 week d/t adding ACE         Answers for HPI/ROS submitted by the patient on 1/10/2021   What is the primary reason for your visit?: Physical

## 2021-01-14 LAB
BASOPHILS # BLD AUTO: 0.07 10*3/MM3 (ref 0–0.2)
BASOPHILS NFR BLD AUTO: 0.4 % (ref 0–1.5)
EOSINOPHIL # BLD AUTO: 0.07 10*3/MM3 (ref 0–0.4)
EOSINOPHIL NFR BLD AUTO: 0.4 % (ref 0.3–6.2)
ERYTHROCYTE [DISTWIDTH] IN BLOOD BY AUTOMATED COUNT: 12 % (ref 12.3–15.4)
FOLATE SERPL-MCNC: >20 NG/ML (ref 4.78–24.2)
HCT VFR BLD AUTO: 44.3 % (ref 34–46.6)
HGB BLD-MCNC: 15.3 G/DL (ref 12–15.9)
IMM GRANULOCYTES # BLD AUTO: 0.07 10*3/MM3 (ref 0–0.05)
IMM GRANULOCYTES NFR BLD AUTO: 0.4 % (ref 0–0.5)
LYMPHOCYTES # BLD AUTO: 4.24 10*3/MM3 (ref 0.7–3.1)
LYMPHOCYTES NFR BLD AUTO: 27 % (ref 19.6–45.3)
MCH RBC QN AUTO: 33.6 PG (ref 26.6–33)
MCHC RBC AUTO-ENTMCNC: 34.5 G/DL (ref 31.5–35.7)
MCV RBC AUTO: 97.1 FL (ref 79–97)
MONOCYTES # BLD AUTO: 1.05 10*3/MM3 (ref 0.1–0.9)
MONOCYTES NFR BLD AUTO: 6.7 % (ref 5–12)
NEUTROPHILS # BLD AUTO: 10.18 10*3/MM3 (ref 1.7–7)
NEUTROPHILS NFR BLD AUTO: 65.1 % (ref 42.7–76)
NRBC BLD AUTO-RTO: 0 /100 WBC (ref 0–0.2)
PLATELET # BLD AUTO: 279 10*3/MM3 (ref 140–450)
RBC # BLD AUTO: 4.56 10*6/MM3 (ref 3.77–5.28)
VIT B12 SERPL-MCNC: 963 PG/ML (ref 211–946)
WBC # BLD AUTO: 15.68 10*3/MM3 (ref 3.4–10.8)

## 2021-01-21 ENCOUNTER — OFFICE VISIT (OUTPATIENT)
Dept: ENDOCRINOLOGY | Age: 71
End: 2021-01-21

## 2021-01-21 VITALS
BODY MASS INDEX: 28.45 KG/M2 | SYSTOLIC BLOOD PRESSURE: 130 MMHG | DIASTOLIC BLOOD PRESSURE: 82 MMHG | WEIGHT: 177 LBS | HEIGHT: 66 IN | RESPIRATION RATE: 16 BRPM

## 2021-01-21 DIAGNOSIS — E03.9 PRIMARY HYPOTHYROIDISM: Primary | ICD-10-CM

## 2021-01-21 PROCEDURE — 99214 OFFICE O/P EST MOD 30 MIN: CPT | Performed by: NURSE PRACTITIONER

## 2021-01-21 RX ORDER — VIT C/B6/B5/MAGNESIUM/HERB 173 50-5-6-5MG
CAPSULE ORAL
COMMUNITY
Start: 2020-04-06

## 2021-01-21 RX ORDER — LEVOTHYROXINE SODIUM 112 MCG
112 TABLET ORAL DAILY
Qty: 90 TABLET | Refills: 1 | Status: SHIPPED | OUTPATIENT
Start: 2021-01-21 | End: 2021-03-15

## 2021-01-21 NOTE — PROGRESS NOTES
Chief Complaint  Hypothyroidism    Subjective          Onelia Alegre presents to [facility] for   History of Present Illness    Hypothyroid   On replacement for about 3 years   Has h/o palpitations that have somewhat worsened over the last year. She is seeing a cardiologist next week per her PCP for evaluation  TSH 0.7  TT4  13.4 (elevated)   FT4 2.29 ( elevated  Currently on synthroid 125mcg daily   Denies dyspahia  Has hoarseness in the morning at night from ? Allergies that improves during the day    Thyroid u/s: 06/2016   CONCLUSION: There is no thyroid enlargement. There are several very  small thyroid nodules of doubtful significance.    Hyperlipidemia   On atorvastatin 10mcg  Tolerating well   LDL 73    Social History     Substance and Sexual Activity   Alcohol Use Yes    Comment: social     Social History     Tobacco Use   Smoking Status Never Smoker   Smokeless Tobacco Never Used         Review of Systems   Constitutional: Negative for activity change, appetite change and fatigue.   HENT: Negative for sore throat, trouble swallowing and voice change.    Respiratory: Negative for cough, choking and shortness of breath.    Cardiovascular: Positive for palpitations. Negative for chest pain and leg swelling.   Gastrointestinal: Negative for abdominal pain, constipation, diarrhea, nausea and vomiting.   Endocrine: Negative for cold intolerance, heat intolerance, polydipsia, polyphagia and polyuria.   Genitourinary: Negative for decreased urine volume, dysuria, flank pain and urgency.   Musculoskeletal: Negative for arthralgias, gait problem and myalgias.   Skin: Negative for color change, rash and wound.   Neurological: Negative for dizziness, weakness, light-headedness, numbness and headaches.   Hematological: Does not bruise/bleed easily.   Psychiatric/Behavioral: Positive for sleep disturbance. The patient is nervous/anxious.        Objective   Vital Signs:   /82    "Resp 16   Ht 167.6 cm (65.98\")   Wt 80.3 kg (177 lb)   BMI 28.59 kg/m²     Physical Exam  Vitals signs reviewed.   Constitutional:       General: She is not in acute distress.  HENT:      Head: Normocephalic and atraumatic.   Neck:      Musculoskeletal: Normal range of motion and neck supple.   Cardiovascular:      Rate and Rhythm: Normal rate and regular rhythm.   Pulmonary:      Effort: Pulmonary effort is normal. No respiratory distress.   Musculoskeletal: Normal range of motion.         General: No signs of injury.   Skin:     General: Skin is warm and dry.   Neurological:      Mental Status: She is alert and oriented to person, place, and time. Mental status is at baseline.   Psychiatric:         Mood and Affect: Mood normal.         Behavior: Behavior normal.         Thought Content: Thought content normal.         Judgment: Judgment normal.        Result Review :   The following data was reviewed by: EMI Arteaga on 01/21/2021:  Common labs    Common Labsle 6/24/20 6/24/20 6/24/20 1/7/21 1/7/21 1/13/21    1010 1010 1010 1011 1011    Glucose  105 (A)   90    BUN  17   20    Creatinine  0.85   0.85    eGFR Non  Am  66   66    eGFR African Am  80   80    Sodium  137   137    Potassium  4.6   4.3    Chloride  100   99    Calcium  9.9   10.0    Total Protein   7.1  6.8    Albumin   4.30  4.30    Total Bilirubin   0.5  1.2    Alkaline Phosphatase   66  76    AST (SGOT)   19  15    ALT (SGPT)   13  14    WBC 10.36     15.68 (A)   Hemoglobin 14.1     15.3   Hematocrit 40.7     44.3   Platelets 302     279   Total Cholesterol    151     Triglycerides    122     HDL Cholesterol    56     LDL Cholesterol     73     (A) Abnormal value       Comments are available for some flowsheets but are not being displayed.                     Assessment and Plan    Problem List Items Addressed This Visit        Other    Primary hypothyroidism - Primary    Relevant Medications    Synthroid 112 MCG tablet    Other " Relevant Orders    TSH    T4, Free    T3, Free          Follow Up   Return in about 6 months (around 7/21/2021).     Hypothyroid, worse   Change to 112 and repeat labs in 6 weeks  Needs close monitoring to reduce risk of complications from over or under treatment with thyroid hormone replacement  Dose decreased to ensure palpitations not worsened by elevated T4 levels.    Continue statin      Patient was given instructions and counseling regarding her condition or for health maintenance advice. Please see specific information pulled into the AVS if appropriate.

## 2021-01-27 ENCOUNTER — OFFICE VISIT (OUTPATIENT)
Dept: CARDIOLOGY | Facility: CLINIC | Age: 71
End: 2021-01-27

## 2021-01-27 VITALS
OXYGEN SATURATION: 98 % | SYSTOLIC BLOOD PRESSURE: 136 MMHG | HEART RATE: 117 BPM | HEIGHT: 66 IN | BODY MASS INDEX: 28.12 KG/M2 | WEIGHT: 175 LBS | DIASTOLIC BLOOD PRESSURE: 76 MMHG

## 2021-01-27 DIAGNOSIS — I10 BENIGN ESSENTIAL HYPERTENSION: Primary | ICD-10-CM

## 2021-01-27 DIAGNOSIS — R07.89 OTHER CHEST PAIN: ICD-10-CM

## 2021-01-27 DIAGNOSIS — R00.2 PALPITATIONS: ICD-10-CM

## 2021-01-27 PROCEDURE — 93000 ELECTROCARDIOGRAM COMPLETE: CPT | Performed by: INTERNAL MEDICINE

## 2021-01-27 PROCEDURE — 99204 OFFICE O/P NEW MOD 45 MIN: CPT | Performed by: INTERNAL MEDICINE

## 2021-01-27 NOTE — PROGRESS NOTES
Subjective:     Encounter Date:01/27/2021      Patient ID: Onelia Alegre is a 70 y.o. female.    Chief Complaint: Chest discomfort/palpitations  History of Present Illness    70-year-old female who presents today for evaluation.  Patient had a rapid heartbeat on and off for many many years.  She said she was on Inderal for 20 years and for the past 5 or 6 years but she changed over to metoprolol.  She still has some intermittent episodes but overall it sounds like they are fairly tolerated.  She said about 3 weeks ago she had a extreme pain in her substernal/xiphoid area that radiated right to her back.  She did take some antacids did not seem to help itself and resolved after about an hour and she had no further episodes since and has been feeling okay.  Says she can walk up to a mile at times with no issues.    Review of Systems   All other systems reviewed and are negative.        ECG 12 Lead    Date/Time: 1/27/2021 4:11 PM  Performed by: Brendon Mendoza MD  Authorized by: Brendon Mendoza MD   Comparison: compared with previous ECG from 1/12/2021  Similar to previous ECG  Rhythm: sinus tachycardia  Conduction: left anterior fascicular block  Other findings: poor R wave progression    Clinical impression: abnormal EKG               Objective:     Vitals signs reviewed.   Constitutional:       Appearance: Well-developed.   Eyes:      Conjunctiva/sclera: Conjunctivae normal.   HENT:      Head: Normocephalic.   Neck:      Musculoskeletal: Normal range of motion.   Pulmonary:      Breath sounds: Normal breath sounds.   Cardiovascular:      Normal rate. Regular rhythm.   Edema:     Peripheral edema absent.   Abdominal:      General: Bowel sounds are normal.      Palpations: Abdomen is soft.   Musculoskeletal: Normal range of motion.   Skin:     General: Skin is warm and dry.   Neurological:      Mental Status: Alert and oriented to person, place, and time.   Psychiatric:         Behavior: Behavior  normal.         Lab Review:       Assessment:          Diagnosis Plan   1. Benign essential hypertension     2. Palpitations  Holter Monitor - 24 Hour    Adult Transthoracic Echo Complete W/ Cont if Necessary Per Protocol    Treadmill Stress Test   3. Other chest pain  Adult Transthoracic Echo Complete W/ Cont if Necessary Per Protocol    Treadmill Stress Test          Plan:         1 chest pain atypical I still think it is GI.  Her ECG has not changed brand with her up for treadmill just to see if she has any issues.  2.  Palpitations were to do an echocardiogram to rule out structural heart disease as well as Holter monitor.  Believe or not she has been empirically treated in the past done well with that but has never had a formal work-up.  3.  If all is unremarkable I will follow clinically and see what evolves.

## 2021-02-02 ENCOUNTER — OFFICE VISIT (OUTPATIENT)
Dept: FAMILY MEDICINE CLINIC | Facility: CLINIC | Age: 71
End: 2021-02-02

## 2021-02-02 VITALS
HEIGHT: 66 IN | TEMPERATURE: 97.5 F | OXYGEN SATURATION: 100 % | RESPIRATION RATE: 16 BRPM | WEIGHT: 174 LBS | DIASTOLIC BLOOD PRESSURE: 82 MMHG | BODY MASS INDEX: 27.97 KG/M2 | SYSTOLIC BLOOD PRESSURE: 139 MMHG | HEART RATE: 82 BPM

## 2021-02-02 DIAGNOSIS — E55.9 VITAMIN D DEFICIENCY: Chronic | ICD-10-CM

## 2021-02-02 DIAGNOSIS — I10 BENIGN ESSENTIAL HYPERTENSION: Primary | Chronic | ICD-10-CM

## 2021-02-02 DIAGNOSIS — R79.89 ABNORMAL CBC: ICD-10-CM

## 2021-02-02 DIAGNOSIS — R00.2 PALPITATIONS: Chronic | ICD-10-CM

## 2021-02-02 DIAGNOSIS — E78.5 DYSLIPIDEMIA: Chronic | ICD-10-CM

## 2021-02-02 DIAGNOSIS — D72.829 LEUKOCYTOSIS, UNSPECIFIED TYPE: ICD-10-CM

## 2021-02-02 DIAGNOSIS — E03.9 PRIMARY HYPOTHYROIDISM: Chronic | ICD-10-CM

## 2021-02-02 PROCEDURE — 99214 OFFICE O/P EST MOD 30 MIN: CPT | Performed by: PHYSICIAN ASSISTANT

## 2021-02-02 RX ORDER — ESTRADIOL 0.5 MG/1
0.5 TABLET ORAL DAILY
Qty: 90 TABLET | Refills: 1 | Status: SHIPPED | OUTPATIENT
Start: 2021-02-02 | End: 2021-07-23 | Stop reason: SDUPTHER

## 2021-02-02 RX ORDER — HYDROCHLOROTHIAZIDE 25 MG/1
25 TABLET ORAL DAILY
Qty: 90 TABLET | Refills: 1 | Status: SHIPPED | OUTPATIENT
Start: 2021-02-02 | End: 2021-07-14

## 2021-02-02 RX ORDER — LISINOPRIL 10 MG/1
10 TABLET ORAL DAILY
Qty: 90 TABLET | Refills: 0 | Status: SHIPPED | OUTPATIENT
Start: 2021-02-02 | End: 2021-02-23 | Stop reason: SDUPTHER

## 2021-02-02 NOTE — PROGRESS NOTES
"Subjective   Onelia Alegre is a 70 y.o. female.     History of Present Illness   Onelia Alegre 70 y.o. female /82   Pulse 82   Temp 97.5 °F (36.4 °C)   Resp 16   Ht 167.6 cm (65.98\")   Wt 78.9 kg (174 lb)   LMP  (LMP Unknown)   SpO2 100%   BMI 28.10 kg/m²  who presents today for bp check from routine visit done 1-12-21; I added Lisinopril for essential HTN and f/u on this today. No cough. I had her stop Dyazide and change to HCTZ d/t adding ACE---check BMP today.  she has a history of   Patient Active Problem List   Diagnosis   • Benign essential hypertension   • Osteopenia   • Excess weight   • Localized edema   • Thyroid function test abnormal   • Primary hypothyroidism   • Vitamin D deficiency   • Dyslipidemia   • Hyperuricemia   • Symptomatic menopausal or female climacteric states   • Renal insufficiency   • Palpitations   .      I want her to lower Estrace to 0.5mg and keep prometrium ---try to taper off ---has mammo sched and DEXA;  Dr Farooq had her on HRT and will help her get off    Saw cardio and having echo and Holter; also eval palpitations     I want f/u WBC high, leuk high and lymph high;   Was not on oral pred but had steroid shots in knees a few weeks prior    The following portions of the patient's history were reviewed and updated as appropriate: allergies, current medications, past family history, past medical history, past social history, past surgical history and problem list.    Review of Systems   Constitutional: Negative for activity change, appetite change, fever and unexpected weight change.   HENT: Negative for nosebleeds and trouble swallowing.    Eyes: Negative for pain and visual disturbance.   Respiratory: Negative for choking, chest tightness, shortness of breath, wheezing and stridor.    Cardiovascular: Positive for palpitations. Negative for chest pain.   Gastrointestinal: Negative for abdominal pain and blood in stool.   Endocrine: Negative.  Negative for " polydipsia.   Genitourinary: Negative for difficulty urinating, genital sores and hematuria.   Musculoskeletal: Negative for joint swelling.   Skin: Negative for color change and rash.   Allergic/Immunologic: Negative.  Negative for immunocompromised state.   Neurological: Negative for seizures, syncope, facial asymmetry and speech difficulty.   Hematological: Negative for adenopathy.   Psychiatric/Behavioral: Negative for agitation, behavioral problems, confusion, dysphoric mood, self-injury and suicidal ideas.   All other systems reviewed and are negative.      Objective   Physical Exam  Vitals signs and nursing note reviewed.   Constitutional:       General: She is not in acute distress.     Appearance: Normal appearance. She is well-developed. She is not ill-appearing, toxic-appearing or diaphoretic.   HENT:      Head: Normocephalic.      Right Ear: External ear normal.      Left Ear: External ear normal.      Nose: Nose normal.      Mouth/Throat:      Pharynx: Oropharynx is clear.   Eyes:      General: No scleral icterus.     Conjunctiva/sclera: Conjunctivae normal.      Pupils: Pupils are equal, round, and reactive to light.   Neck:      Musculoskeletal: Normal range of motion and neck supple.      Thyroid: No thyromegaly.      Vascular: No carotid bruit.   Cardiovascular:      Rate and Rhythm: Normal rate and regular rhythm.      Pulses: Normal pulses.      Heart sounds: Normal heart sounds. No murmur.   Pulmonary:      Effort: Pulmonary effort is normal. No respiratory distress.      Breath sounds: Normal breath sounds. No rales.   Musculoskeletal: Normal range of motion.         General: No deformity.      Right lower leg: Edema present.      Left lower leg: Edema present.      Comments: Trace pedal edema = bilat     Skin:     General: Skin is warm and dry.      Coloration: Skin is not jaundiced.      Findings: No rash.   Neurological:      General: No focal deficit present.      Mental Status: She is alert  and oriented to person, place, and time. Mental status is at baseline.      Gait: Gait normal.   Psychiatric:         Mood and Affect: Mood normal.         Behavior: Behavior normal.         Thought Content: Thought content normal.         Judgment: Judgment normal.         Assessment/Plan   Diagnoses and all orders for this visit:    1. Benign essential hypertension (Primary)  -     Basic Metabolic Panel  -     CBC & Differential    2. Primary hypothyroidism    3. Dyslipidemia    4. Vitamin D deficiency    5. Palpitations    6. Abnormal CBC  -     Basic Metabolic Panel  -     CBC & Differential    Other orders  -     progesterone (PROMETRIUM) 200 MG capsule; Take 1 capsule by mouth Daily.  Dispense: 90 capsule; Refill: 3  -     estradiol (Estrace) 0.5 MG tablet; Take 1 tablet by mouth Daily. New dose  Dispense: 90 tablet; Refill: 1  -     hydroCHLOROthiazide (HYDRODIURIL) 25 MG tablet; Take 1 tablet by mouth Daily. For BP with Lisinopril (stopped Dyazide)  Dispense: 90 tablet; Refill: 1  -     lisinopril (PRINIVIL,ZESTRIL) 10 MG tablet; Take 1 tablet by mouth Daily. With HCTZ 25mg for BP  Dispense: 90 tablet; Refill: 0      Plan, Onelia Alegre, was seen today.  she was seen for HTN and add/adjust medication, Hyperlipidemia and will continue current medication, Hypothyroidism and under the care of Endocrinologist and Vitamin D deficiency and supplemented.  Repeat CBC today; WBC, leukocytes, lymphs high  Lower estrogen dose and keep Prometrium---want to taper off  F/u cardio  Seeing endocrine for thyroid and recent dose lowered; mammo and DEXA scheduled.  Stop Allopurinol; no hx gout         Answers for HPI/ROS submitted by the patient on 1/31/2021   What is the primary reason for your visit?: High Blood Pressure

## 2021-02-03 LAB
BASOPHILS # BLD AUTO: 0.07 10*3/MM3 (ref 0–0.2)
BASOPHILS NFR BLD AUTO: 0.5 % (ref 0–1.5)
BUN SERPL-MCNC: 16 MG/DL (ref 8–23)
BUN/CREAT SERPL: 19 (ref 7–25)
CALCIUM SERPL-MCNC: 10.2 MG/DL (ref 8.6–10.5)
CHLORIDE SERPL-SCNC: 98 MMOL/L (ref 98–107)
CO2 SERPL-SCNC: 29.6 MMOL/L (ref 22–29)
CREAT SERPL-MCNC: 0.84 MG/DL (ref 0.57–1)
EOSINOPHIL # BLD AUTO: 0.05 10*3/MM3 (ref 0–0.4)
EOSINOPHIL NFR BLD AUTO: 0.4 % (ref 0.3–6.2)
ERYTHROCYTE [DISTWIDTH] IN BLOOD BY AUTOMATED COUNT: 12 % (ref 12.3–15.4)
GLUCOSE SERPL-MCNC: 87 MG/DL (ref 65–99)
HCT VFR BLD AUTO: 44.4 % (ref 34–46.6)
HGB BLD-MCNC: 14.9 G/DL (ref 12–15.9)
IMM GRANULOCYTES # BLD AUTO: 0.11 10*3/MM3 (ref 0–0.05)
IMM GRANULOCYTES NFR BLD AUTO: 0.8 % (ref 0–0.5)
LYMPHOCYTES # BLD AUTO: 3.26 10*3/MM3 (ref 0.7–3.1)
LYMPHOCYTES NFR BLD AUTO: 23 % (ref 19.6–45.3)
MCH RBC QN AUTO: 32.3 PG (ref 26.6–33)
MCHC RBC AUTO-ENTMCNC: 33.6 G/DL (ref 31.5–35.7)
MCV RBC AUTO: 96.1 FL (ref 79–97)
MONOCYTES # BLD AUTO: 0.82 10*3/MM3 (ref 0.1–0.9)
MONOCYTES NFR BLD AUTO: 5.8 % (ref 5–12)
NEUTROPHILS # BLD AUTO: 9.84 10*3/MM3 (ref 1.7–7)
NEUTROPHILS NFR BLD AUTO: 69.5 % (ref 42.7–76)
NRBC BLD AUTO-RTO: 0 /100 WBC (ref 0–0.2)
PLATELET # BLD AUTO: 323 10*3/MM3 (ref 140–450)
POTASSIUM SERPL-SCNC: 4 MMOL/L (ref 3.5–5.2)
RBC # BLD AUTO: 4.62 10*6/MM3 (ref 3.77–5.28)
SODIUM SERPL-SCNC: 139 MMOL/L (ref 136–145)
WBC # BLD AUTO: 14.15 10*3/MM3 (ref 3.4–10.8)

## 2021-02-09 RX ORDER — HYDROCHLOROTHIAZIDE 25 MG/1
TABLET ORAL
Qty: 30 TABLET | Refills: 0 | OUTPATIENT
Start: 2021-02-09

## 2021-02-09 RX ORDER — LISINOPRIL 5 MG/1
TABLET ORAL
Qty: 30 TABLET | Refills: 0 | OUTPATIENT
Start: 2021-02-09

## 2021-02-09 NOTE — PROGRESS NOTES
REFERRING PROVIDER:    Raven Travis PA-C  [id number] [facility]  [address]  [place] [address]    REASON FOR CONSULTATION:    Leukocytosis with neutrophilia    HISTORY OF PRESENT ILLNESS:  Onelia Alegre is a 70 y.o. female who is referred today for further evaluation of leukocytosis with neutrophilia.    This has been mild and chronic dating back to at least 3/28/2019 with a white blood cell count of 12.65 and differential showing 70% neutrophils and 21.6% lymphocytes at that time.  The white blood cell count normalized but then increased to 15.98 as of 12/16/2019.  Normal white blood cell count on 6/24/2020.  On 1/13/2021 the white blood cell count was 15.68 with a normal-appearing differential and on 2/2/2021 the white blood cell count was 14.15, again with a normal-appearing differential.  Hemoglobin and platelets have been normal.    She generally feels well.  She denies any chronic pain issues other than some pain in her knees for which she does receive steroid injections.  She denies any dental issues.  No fevers or chills.  No other infectious symptoms.  No significant stress or anxiety.  She does report a history of palpitations.  This is going to be evaluated by cardiology.  She has hypothyroidism and is currently on levothyroxine.    She does note easy bruising.  This has been going on for the past couple of years but not before.  She does have a history of menorrhagia.  She had some bleeding after wisdom tooth extraction but otherwise denies any bleeding after other surgical procedures.        Past Medical History:   Diagnosis Date   • Abnormal creatinine clearance glomerular filtration    • Abnormal thyroid function test    • Allergic rhinitis    • Benign essential hypertension    • Colon polyp    • Concussion 5/20/2016   • Dependent edema    • Dyslipidemia    • Fibrocystic disease of breast    • H/O bone density study 05/19/2014   • History of Papanicolaou smear of cervix 12/2011   •  "Hyperlipidemia    • Hypothyroidism    • Limb swelling    • Other chest pain    • Overweight    • Palpitation    • Tinnitus    • Vitamin D deficiency        Past Surgical History:   Procedure Laterality Date   • BREAST BIOPSY     • COLONOSCOPY  2005   • COLPOSCOPY  2005   • DILATATION AND CURETTAGE     • ENDOMETRIAL ABLATION  2000    Dr. Lopez   • EYE SURGERY  2015    Dr. Vee   • KNEE SURGERY  12/2019    repair   • TUBAL ABDOMINAL LIGATION      bilateral       SOCIAL HISTORY:   reports that she has never smoked. She has never used smokeless tobacco. She reports current alcohol use. She reports that she does not use drugs.    FAMILY HISTORY:  family history includes Colon cancer in her father; Heart attack in her maternal aunt and paternal grandmother; Heart disease in her maternal aunt, maternal aunt, mother, paternal aunt, and paternal grandmother; Heart failure in her maternal aunt; Hyperlipidemia in her mother; Hypertension in her mother and paternal grandmother; Lung cancer in her mother; Stroke in her paternal grandmother; Uterine cancer in her mother.    ALLERGIES:  Allergies   Allergen Reactions   • Actonel [Risedronate Sodium] Other (See Comments)     Other reaction(s): ARTHRALGIAS   • Penicillins Other (See Comments) and Rash     Other reaction(s): JOINT PAIN   • Sulfa Antibiotics Rash       MEDICATIONS:  The medication list has been reviewed with the patient by the medical assistant, and the list has been updated in the electronic medical record, which I reviewed.  Medication dosages and frequencies were confirmed to be accurate.    Review of Systems   Musculoskeletal: Positive for arthralgias.   Hematological: Bruises/bleeds easily.   All other systems reviewed and are negative.      Vitals:    02/10/21 0822   BP: 148/88   Resp: 16   Temp: 97.1 °F (36.2 °C)   TempSrc: Temporal   SpO2: 96%   Weight: 79.8 kg (176 lb)   Height: 167 cm (65.75\")   PainSc: 0-No pain  Comment: leukocytosis       Physical " Exam  Vitals signs reviewed.   Constitutional:       Appearance: She is well-developed.   HENT:      Head: Normocephalic and atraumatic.      Nose: Nose normal.   Eyes:      Conjunctiva/sclera: Conjunctivae normal.      Pupils: Pupils are equal, round, and reactive to light.   Neck:      Musculoskeletal: Neck supple.   Cardiovascular:      Rate and Rhythm: Normal rate and regular rhythm.      Heart sounds: Normal heart sounds, S1 normal and S2 normal. No murmur. No friction rub. No gallop.    Pulmonary:      Effort: Pulmonary effort is normal. No respiratory distress.      Breath sounds: Normal breath sounds. No stridor. No wheezing, rhonchi or rales.   Chest:      Chest wall: No tenderness.   Abdominal:      General: Bowel sounds are normal. There is no distension.      Palpations: Abdomen is soft. There is no mass.      Tenderness: There is no abdominal tenderness. There is no guarding or rebound.   Musculoskeletal: Normal range of motion.   Lymphadenopathy:      Cervical: No cervical adenopathy.      Upper Body:      Right upper body: No supraclavicular adenopathy.      Left upper body: No supraclavicular adenopathy.   Skin:     General: Skin is warm and dry.      Findings: Abrasion (Left upper extremity from a cat scratch) and bruising (Some purpura on the right upper extremity) present. No erythema or rash.   Neurological:      Mental Status: She is alert and oriented to person, place, and time.      Cranial Nerves: No cranial nerve deficit.      Sensory: No sensory deficit.   Psychiatric:         Behavior: Behavior normal.         Thought Content: Thought content normal.         Judgment: Judgment normal.         DIAGNOSTIC DATA:  CBC & Differential (02/10/2021 08:31)      IMAGING:    I personally reviewed the peripheral blood smear.  I do not visualize any abnormal white blood cells.  He red cells appear normal.  Platelets adequate in number and normal in morphology.    ASSESSMENT:  This is a 70 y.o. female  with:    *Leukocytosis with neutrophilia  · This has been mild and chronic dating back to at least 3/28/2019 with a white blood cell count of 12.65 and differential showing 70% neutrophils and 21.6% lymphocytes at that time.  The white blood cell count normalized but then increased to 15.98 as of 12/16/2019.  Normal white blood cell count on 6/24/2020.  On 1/13/2021 the white blood cell count was 15.68 with a normal-appearing differential and on 2/2/2021 the white blood cell count was 14.15, again with a normal-appearing differential.    · Hemoglobin and platelets have been normal which is reassuring.  • No obvious etiology for this unless it is related to her knee pain and injections.  • No chronic stress.  No other chronic pain.  No evidence for infections.  No dental issues.  No significant stress or anxiety.    *Easy bruising  • Ongoing over the past couple of years  • History of menorrhagia  • She is not on aspirin or NSAIDs    PLAN:   1. We will inquire with insurance as to whether we can further evaluate her for a myeloproliferative disorder with peripheral blood flow cytometry, FISH for BCR/ABL, and a JAK2 mutation analysis.  2. Assuming so, we will bring her back for labs in the near future  3. At that time we will also evaluate for a bleeding disorder with a PT/INR, PTT, fibrinogen, thrombin time, and PFA-100.  4. Follow-up based on when we can do the lab evaluation    45 minutes were spent in this visit today reviewing her record, spending time with the patient, and documenting the encounter.

## 2021-02-10 ENCOUNTER — CONSULT (OUTPATIENT)
Dept: ONCOLOGY | Facility: CLINIC | Age: 71
End: 2021-02-10

## 2021-02-10 ENCOUNTER — LAB (OUTPATIENT)
Dept: LAB | Facility: HOSPITAL | Age: 71
End: 2021-02-10

## 2021-02-10 VITALS
BODY MASS INDEX: 28.28 KG/M2 | TEMPERATURE: 97.1 F | WEIGHT: 176 LBS | SYSTOLIC BLOOD PRESSURE: 148 MMHG | RESPIRATION RATE: 16 BRPM | DIASTOLIC BLOOD PRESSURE: 88 MMHG | OXYGEN SATURATION: 96 % | HEIGHT: 66 IN

## 2021-02-10 DIAGNOSIS — I10 HYPERTENSION, UNSPECIFIED TYPE: Primary | ICD-10-CM

## 2021-02-10 DIAGNOSIS — R23.3 EASY BRUISING: ICD-10-CM

## 2021-02-10 DIAGNOSIS — D72.9 NEUTROPHILIC LEUKOCYTOSIS: Primary | ICD-10-CM

## 2021-02-10 DIAGNOSIS — D72.829 LEUKOCYTOSIS, UNSPECIFIED TYPE: ICD-10-CM

## 2021-02-10 PROBLEM — D72.828 NEUTROPHILIC LEUKOCYTOSIS: Status: ACTIVE | Noted: 2021-02-10

## 2021-02-10 LAB
BASOPHILS # BLD AUTO: 0.09 10*3/MM3 (ref 0–0.2)
BASOPHILS NFR BLD AUTO: 0.6 % (ref 0–1.5)
DEPRECATED RDW RBC AUTO: 43.8 FL (ref 37–54)
EOSINOPHIL # BLD AUTO: 0.07 10*3/MM3 (ref 0–0.4)
EOSINOPHIL NFR BLD AUTO: 0.5 % (ref 0.3–6.2)
ERYTHROCYTE [DISTWIDTH] IN BLOOD BY AUTOMATED COUNT: 12.7 % (ref 12.3–15.4)
HCT VFR BLD AUTO: 44.1 % (ref 34–46.6)
HGB BLD-MCNC: 15.5 G/DL (ref 12–15.9)
IMM GRANULOCYTES # BLD AUTO: 0.06 10*3/MM3 (ref 0–0.05)
IMM GRANULOCYTES NFR BLD AUTO: 0.4 % (ref 0–0.5)
LYMPHOCYTES # BLD AUTO: 3.8 10*3/MM3 (ref 0.7–3.1)
LYMPHOCYTES NFR BLD AUTO: 25.3 % (ref 19.6–45.3)
MCH RBC QN AUTO: 33.3 PG (ref 26.6–33)
MCHC RBC AUTO-ENTMCNC: 35.1 G/DL (ref 31.5–35.7)
MCV RBC AUTO: 94.6 FL (ref 79–97)
MONOCYTES # BLD AUTO: 0.98 10*3/MM3 (ref 0.1–0.9)
MONOCYTES NFR BLD AUTO: 6.5 % (ref 5–12)
NEUTROPHILS NFR BLD AUTO: 10.02 10*3/MM3 (ref 1.7–7)
NEUTROPHILS NFR BLD AUTO: 66.7 % (ref 42.7–76)
NRBC BLD AUTO-RTO: 0 /100 WBC (ref 0–0.2)
PLATELET # BLD AUTO: 227 10*3/MM3 (ref 140–450)
PMV BLD AUTO: 10.7 FL (ref 6–12)
RBC # BLD AUTO: 4.66 10*6/MM3 (ref 3.77–5.28)
WBC # BLD AUTO: 15.02 10*3/MM3 (ref 3.4–10.8)

## 2021-02-10 PROCEDURE — 99204 OFFICE O/P NEW MOD 45 MIN: CPT | Performed by: INTERNAL MEDICINE

## 2021-02-10 PROCEDURE — 36415 COLL VENOUS BLD VENIPUNCTURE: CPT

## 2021-02-10 PROCEDURE — 85025 COMPLETE CBC W/AUTO DIFF WBC: CPT

## 2021-02-11 ENCOUNTER — TELEPHONE (OUTPATIENT)
Dept: GENERAL RADIOLOGY | Facility: HOSPITAL | Age: 71
End: 2021-02-11

## 2021-02-11 NOTE — TELEPHONE ENCOUNTER
----- Message from Ella Hooper sent at 2/11/2021  4:16 PM EST -----  FLOW - [id number], [id number], [id number] AND JAK2 [id number] - [id number] - NO PA NEEDED - GOOD TO GO - PLEASE CALL PT TO COME IN AND HAVE LABS DRAWN PER THOM.    THANKS,  ELLA

## 2021-02-12 ENCOUNTER — HOSPITAL ENCOUNTER (OUTPATIENT)
Dept: CARDIOLOGY | Facility: HOSPITAL | Age: 71
Discharge: HOME | End: 2021-02-12

## 2021-02-12 VITALS
HEIGHT: 66 IN | DIASTOLIC BLOOD PRESSURE: 76 MMHG | WEIGHT: 176 LBS | HEART RATE: 88 BPM | BODY MASS INDEX: 28.28 KG/M2 | SYSTOLIC BLOOD PRESSURE: 136 MMHG

## 2021-02-12 DIAGNOSIS — R00.2 PALPITATIONS: ICD-10-CM

## 2021-02-12 DIAGNOSIS — R07.89 OTHER CHEST PAIN: ICD-10-CM

## 2021-02-12 LAB
BH CV STRESS BP STAGE 1: NORMAL
BH CV STRESS BP STAGE 2: NORMAL
BH CV STRESS DURATION MIN STAGE 1: 3
BH CV STRESS DURATION MIN STAGE 2: 2
BH CV STRESS DURATION SEC STAGE 1: 0
BH CV STRESS DURATION SEC STAGE 2: 30
BH CV STRESS GRADE STAGE 1: 10
BH CV STRESS GRADE STAGE 2: 12
BH CV STRESS HR STAGE 1: 171
BH CV STRESS HR STAGE 2: 179
BH CV STRESS METS STAGE 1: 5
BH CV STRESS METS STAGE 2: 7.5
BH CV STRESS O2 STAGE 2: 97
BH CV STRESS PROTOCOL 1: NORMAL
BH CV STRESS RECOVERY BP: NORMAL MMHG
BH CV STRESS RECOVERY HR: 141 BPM
BH CV STRESS SPEED STAGE 1: 1.7
BH CV STRESS SPEED STAGE 2: 2.5
BH CV STRESS STAGE 1: 1
BH CV STRESS STAGE 2: NORMAL
MAXIMAL PREDICTED HEART RATE: 148 BPM
PERCENT MAX PREDICTED HR: 120.95 %
STRESS BASELINE BP: NORMAL MMHG
STRESS BASELINE HR: 135 BPM
STRESS PERCENT HR: 142 %
STRESS POST ESTIMATED WORKLOAD: 7 METS
STRESS POST EXERCISE DUR MIN: 5 MIN
STRESS POST EXERCISE DUR SEC: 30 SEC
STRESS POST O2 SAT PEAK: 97 %
STRESS POST PEAK BP: NORMAL MMHG
STRESS POST PEAK HR: 179 BPM
STRESS TARGET HR: 126 BPM

## 2021-02-12 PROCEDURE — 93306 TTE W/DOPPLER COMPLETE: CPT

## 2021-02-12 PROCEDURE — 93016 CV STRESS TEST SUPVJ ONLY: CPT | Performed by: INTERNAL MEDICINE

## 2021-02-12 PROCEDURE — 93017 CV STRESS TEST TRACING ONLY: CPT

## 2021-02-12 PROCEDURE — 93306 TTE W/DOPPLER COMPLETE: CPT | Performed by: INTERNAL MEDICINE

## 2021-02-12 PROCEDURE — 93018 CV STRESS TEST I&R ONLY: CPT | Performed by: INTERNAL MEDICINE

## 2021-02-15 ENCOUNTER — LAB (OUTPATIENT)
Dept: LAB | Facility: HOSPITAL | Age: 71
End: 2021-02-15

## 2021-02-15 DIAGNOSIS — D72.829 LEUKOCYTOSIS, UNSPECIFIED TYPE: Primary | ICD-10-CM

## 2021-02-15 LAB
AORTIC ARCH: 2.6 CM
BH CV ECHO MEAS - ACS: 3.4 CM
BH CV ECHO MEAS - AI DEC SLOPE: 185.3 CM/SEC^2
BH CV ECHO MEAS - AI MAX PG: 54.6 MMHG
BH CV ECHO MEAS - AI MAX VEL: 369.6 CM/SEC
BH CV ECHO MEAS - AI P1/2T: 584.2 MSEC
BH CV ECHO MEAS - AO MAX PG (FULL): 5.9 MMHG
BH CV ECHO MEAS - AO MAX PG: 10 MMHG
BH CV ECHO MEAS - AO MEAN PG (FULL): 2.8 MMHG
BH CV ECHO MEAS - AO MEAN PG: 5.3 MMHG
BH CV ECHO MEAS - AO ROOT AREA (BSA CORRECTED): 1.8
BH CV ECHO MEAS - AO ROOT AREA: 9.5 CM^2
BH CV ECHO MEAS - AO ROOT DIAM: 3.5 CM
BH CV ECHO MEAS - AO V2 MAX: 158.4 CM/SEC
BH CV ECHO MEAS - AO V2 MEAN: 106.8 CM/SEC
BH CV ECHO MEAS - AO V2 VTI: 35 CM
BH CV ECHO MEAS - AVA(I,A): 1.8 CM^2
BH CV ECHO MEAS - AVA(I,D): 1.8 CM^2
BH CV ECHO MEAS - AVA(V,A): 2 CM^2
BH CV ECHO MEAS - AVA(V,D): 2 CM^2
BH CV ECHO MEAS - BSA(HAYCOCK): 1.9 M^2
BH CV ECHO MEAS - BSA: 1.9 M^2
BH CV ECHO MEAS - BZI_BMI: 28.4 KILOGRAMS/M^2
BH CV ECHO MEAS - BZI_METRIC_HEIGHT: 167.6 CM
BH CV ECHO MEAS - BZI_METRIC_WEIGHT: 79.8 KG
BH CV ECHO MEAS - EDV(MOD-SP2): 44 ML
BH CV ECHO MEAS - EDV(MOD-SP4): 45 ML
BH CV ECHO MEAS - EDV(TEICH): 76.2 ML
BH CV ECHO MEAS - EF(CUBED): 59.6 %
BH CV ECHO MEAS - EF(MOD-BP): 60.4 %
BH CV ECHO MEAS - EF(MOD-SP2): 59.1 %
BH CV ECHO MEAS - EF(MOD-SP4): 62.2 %
BH CV ECHO MEAS - EF(TEICH): 51.6 %
BH CV ECHO MEAS - ESV(MOD-SP2): 18 ML
BH CV ECHO MEAS - ESV(MOD-SP4): 17 ML
BH CV ECHO MEAS - ESV(TEICH): 36.9 ML
BH CV ECHO MEAS - FS: 26.1 %
BH CV ECHO MEAS - IVS/LVPW: 0.88
BH CV ECHO MEAS - IVSD: 1 CM
BH CV ECHO MEAS - LAT PEAK E' VEL: 12.8 CM/SEC
BH CV ECHO MEAS - LV DIASTOLIC VOL/BSA (35-75): 23.8 ML/M^2
BH CV ECHO MEAS - LV MASS(C)D: 147.2 GRAMS
BH CV ECHO MEAS - LV MASS(C)DI: 77.7 GRAMS/M^2
BH CV ECHO MEAS - LV MAX PG: 4.1 MMHG
BH CV ECHO MEAS - LV MEAN PG: 2.5 MMHG
BH CV ECHO MEAS - LV SYSTOLIC VOL/BSA (12-30): 9 ML/M^2
BH CV ECHO MEAS - LV V1 MAX: 101.4 CM/SEC
BH CV ECHO MEAS - LV V1 MEAN: 74.7 CM/SEC
BH CV ECHO MEAS - LV V1 VTI: 20.8 CM
BH CV ECHO MEAS - LVIDD: 4.1 CM
BH CV ECHO MEAS - LVIDS: 3.1 CM
BH CV ECHO MEAS - LVLD AP2: 6.4 CM
BH CV ECHO MEAS - LVLD AP4: 6.9 CM
BH CV ECHO MEAS - LVLS AP2: 5.4 CM
BH CV ECHO MEAS - LVLS AP4: 5 CM
BH CV ECHO MEAS - LVOT AREA (M): 3.1 CM^2
BH CV ECHO MEAS - LVOT AREA: 3.1 CM^2
BH CV ECHO MEAS - LVOT DIAM: 2 CM
BH CV ECHO MEAS - LVPWD: 1.1 CM
BH CV ECHO MEAS - MED PEAK E' VEL: 7.9 CM/SEC
BH CV ECHO MEAS - MR MAX PG: 46.9 MMHG
BH CV ECHO MEAS - MR MAX VEL: 342.5 CM/SEC
BH CV ECHO MEAS - MV A DUR: 0.12 SEC
BH CV ECHO MEAS - MV A MAX VEL: 85.9 CM/SEC
BH CV ECHO MEAS - MV DEC SLOPE: 288.3 CM/SEC^2
BH CV ECHO MEAS - MV DEC TIME: 0.2 SEC
BH CV ECHO MEAS - MV E MAX VEL: 49.5 CM/SEC
BH CV ECHO MEAS - MV E/A: 0.58
BH CV ECHO MEAS - MV MAX PG: 6.8 MMHG
BH CV ECHO MEAS - MV MEAN PG: 2.7 MMHG
BH CV ECHO MEAS - MV P1/2T MAX VEL: 52.9 CM/SEC
BH CV ECHO MEAS - MV P1/2T: 53.7 MSEC
BH CV ECHO MEAS - MV V2 MAX: 130.3 CM/SEC
BH CV ECHO MEAS - MV V2 MEAN: 76.2 CM/SEC
BH CV ECHO MEAS - MV V2 VTI: 16.5 CM
BH CV ECHO MEAS - MVA P1/2T LCG: 4.2 CM^2
BH CV ECHO MEAS - MVA(P1/2T): 4.1 CM^2
BH CV ECHO MEAS - MVA(VTI): 3.9 CM^2
BH CV ECHO MEAS - PA MAX PG (FULL): 2.9 MMHG
BH CV ECHO MEAS - PA MAX PG: 5.3 MMHG
BH CV ECHO MEAS - PA V2 MAX: 115.4 CM/SEC
BH CV ECHO MEAS - PULM A REVS DUR: 0.12 SEC
BH CV ECHO MEAS - PULM A REVS VEL: 44.6 CM/SEC
BH CV ECHO MEAS - PULM DIAS VEL: 38.8 CM/SEC
BH CV ECHO MEAS - PULM S/D: 1.6
BH CV ECHO MEAS - PULM SYS VEL: 62.1 CM/SEC
BH CV ECHO MEAS - PVA(V,A): 2 CM^2
BH CV ECHO MEAS - PVA(V,D): 2 CM^2
BH CV ECHO MEAS - QP/QS: 0.57
BH CV ECHO MEAS - RV MAX PG: 2.4 MMHG
BH CV ECHO MEAS - RV MEAN PG: 1.1 MMHG
BH CV ECHO MEAS - RV V1 MAX: 77.1 CM/SEC
BH CV ECHO MEAS - RV V1 MEAN: 47 CM/SEC
BH CV ECHO MEAS - RV V1 VTI: 12.4 CM
BH CV ECHO MEAS - RVOT AREA: 2.9 CM^2
BH CV ECHO MEAS - RVOT DIAM: 1.9 CM
BH CV ECHO MEAS - SI(AO): 175.5 ML/M^2
BH CV ECHO MEAS - SI(CUBED): 22.4 ML/M^2
BH CV ECHO MEAS - SI(LVOT): 33.9 ML/M^2
BH CV ECHO MEAS - SI(MOD-SP2): 13.7 ML/M^2
BH CV ECHO MEAS - SI(MOD-SP4): 14.8 ML/M^2
BH CV ECHO MEAS - SI(TEICH): 20.8 ML/M^2
BH CV ECHO MEAS - SUP REN AO DIAM: 2.1 CM
BH CV ECHO MEAS - SV(AO): 332.5 ML
BH CV ECHO MEAS - SV(CUBED): 42.5 ML
BH CV ECHO MEAS - SV(LVOT): 64.3 ML
BH CV ECHO MEAS - SV(MOD-SP2): 26 ML
BH CV ECHO MEAS - SV(MOD-SP4): 28 ML
BH CV ECHO MEAS - SV(RVOT): 36.6 ML
BH CV ECHO MEAS - SV(TEICH): 39.3 ML
BH CV ECHO MEAS - TAPSE (>1.6): 2 CM
BH CV ECHO MEASUREMENTS AVERAGE E/E' RATIO: 4.78
BH CV XLRA - RV BASE: 2.9 CM
BH CV XLRA - RV LENGTH: 5.7 CM
BH CV XLRA - RV MID: 2.2 CM
BH CV XLRA - TDI S': 14.5 CM/SEC
LEFT ATRIUM VOLUME INDEX: 16 ML/M2
MAXIMAL PREDICTED HEART RATE: 148 BPM
SINUS: 3.5 CM
STRESS TARGET HR: 126 BPM

## 2021-02-15 PROCEDURE — 88184 FLOWCYTOMETRY/ TC 1 MARKER: CPT

## 2021-02-15 PROCEDURE — 36415 COLL VENOUS BLD VENIPUNCTURE: CPT

## 2021-02-15 PROCEDURE — 88182 CELL MARKER STUDY: CPT

## 2021-02-15 PROCEDURE — 88185 FLOWCYTOMETRY/TC ADD-ON: CPT

## 2021-02-23 ENCOUNTER — OFFICE VISIT (OUTPATIENT)
Dept: FAMILY MEDICINE CLINIC | Facility: CLINIC | Age: 71
End: 2021-02-23

## 2021-02-23 VITALS
WEIGHT: 174 LBS | HEART RATE: 87 BPM | SYSTOLIC BLOOD PRESSURE: 110 MMHG | BODY MASS INDEX: 27.97 KG/M2 | HEIGHT: 66 IN | RESPIRATION RATE: 18 BRPM | OXYGEN SATURATION: 98 % | DIASTOLIC BLOOD PRESSURE: 64 MMHG | TEMPERATURE: 97.5 F

## 2021-02-23 DIAGNOSIS — E55.9 VITAMIN D DEFICIENCY: ICD-10-CM

## 2021-02-23 DIAGNOSIS — I10 BENIGN ESSENTIAL HYPERTENSION: Primary | ICD-10-CM

## 2021-02-23 DIAGNOSIS — E78.5 DYSLIPIDEMIA: ICD-10-CM

## 2021-02-23 DIAGNOSIS — E03.9 PRIMARY HYPOTHYROIDISM: ICD-10-CM

## 2021-02-23 DIAGNOSIS — R60.0 LOCALIZED EDEMA: ICD-10-CM

## 2021-02-23 DIAGNOSIS — D72.9 NEUTROPHILIC LEUKOCYTOSIS: ICD-10-CM

## 2021-02-23 LAB — REF LAB TEST METHOD: NORMAL

## 2021-02-23 PROCEDURE — 99213 OFFICE O/P EST LOW 20 MIN: CPT | Performed by: PHYSICIAN ASSISTANT

## 2021-02-23 RX ORDER — METOPROLOL SUCCINATE 50 MG/1
50 TABLET, EXTENDED RELEASE ORAL DAILY
Qty: 90 TABLET | Refills: 3 | Status: SHIPPED | OUTPATIENT
Start: 2021-02-23 | End: 2021-04-08

## 2021-02-23 RX ORDER — LISINOPRIL 10 MG/1
10 TABLET ORAL DAILY
Qty: 90 TABLET | Refills: 1 | Status: SHIPPED | OUTPATIENT
Start: 2021-02-23 | End: 2021-04-27

## 2021-02-23 RX ORDER — ATORVASTATIN CALCIUM 10 MG/1
10 TABLET, FILM COATED ORAL DAILY
Qty: 90 TABLET | Refills: 3 | Status: SHIPPED | OUTPATIENT
Start: 2021-02-23 | End: 2022-02-22 | Stop reason: SDUPTHER

## 2021-02-23 NOTE — PROGRESS NOTES
"Subjective   Onelia Alegre is a 70 y.o. female.     History of Present Illness        Since the last visit, she has overall felt well.  She has Essential Hypertension and well controlled on current medication, Hyperlipidemia with goals met with current Rx and Hypothyroidism and remains under the care of their Endocrinologist.  she has been compliant with current medications have reviewed them.  The patient denies medication side effects.  Will refill medications. /64   Pulse 87   Temp 97.5 °F (36.4 °C)   Resp 18   Ht 167.6 cm (65.98\")   Wt 78.9 kg (174 lb)   LMP  (LMP Unknown)   SpO2 98%   BMI 28.10 kg/m²   I raised Lisinopril to 10mg 3 weeks ago and f/u today bp  Still on HCTZ and Toprol for palpitations    Results for orders placed or performed during the hospital encounter of 02/12/21   Treadmill Stress Test   Result Value Ref Range    BH CV STRESS PROTOCOL 1 Saúl     Stage 1 1     HR Stage 1 171     BP Stage 1 150/92     Duration Min Stage 1 3     Duration Sec Stage 1 0     Grade Stage 1 10     Speed Stage 1 1.7     BH CV STRESS METS STAGE 1 5     Stage 2 166/94     HR Stage 2 179     BP Stage 2 166/94     O2 Stage 2 97     Duration Min Stage 2 2     Duration Sec Stage 2 30     Grade Stage 2 12     Speed Stage 2 2.5     BH CV STRESS METS STAGE 2 7.5     Baseline  bpm    Baseline /86 mmHg    Peak  bpm    Percent Max Pred .95 %    Percent Target  %    Peak /94 mmHg    O2 sat peak 97 %    Recovery  bpm    Recovery /90 mmHg    Target HR (85%) 126 bpm    Max. Pred. HR (100%) 148 bpm    Exercise duration (min) 5 min    Exercise duration (sec) 30 sec    Estimated workload 7.0 METS     I had her stop Dyazide and change to HCTZ d/t adding ACE  I want her to lower Estrace to 0.5mg and keep prometrium ---try to taper off ---has mammo sched and DEXA;----this is paul    She is seeing hematologist for leukocytosis ---Dr Pemberton for work up      Did see cardio for " palpitations----just had stress test    The following portions of the patient's history were reviewed and updated as appropriate: allergies, current medications, past family history, past medical history, past social history, past surgical history and problem list.    Review of Systems   Constitutional: Negative for fever.   HENT: Negative for nosebleeds and trouble swallowing.    Eyes: Negative for visual disturbance.   Respiratory: Negative for choking and stridor.    Cardiovascular: Positive for leg swelling. Negative for chest pain.   Gastrointestinal: Negative for blood in stool.   Endocrine: Negative for polydipsia.   Genitourinary: Negative for genital sores and hematuria.   Musculoskeletal: Negative for joint swelling.   Skin: Negative for color change and rash.   Allergic/Immunologic: Negative for immunocompromised state.   Neurological: Negative for seizures, facial asymmetry and speech difficulty.   Hematological: Negative for adenopathy.   Psychiatric/Behavioral: Negative for behavioral problems, self-injury and suicidal ideas.       Objective   Physical Exam  Vitals signs and nursing note reviewed.   Constitutional:       General: She is not in acute distress.     Appearance: Normal appearance. She is well-developed. She is not ill-appearing, toxic-appearing or diaphoretic.   HENT:      Head: Normocephalic.      Right Ear: External ear normal.      Left Ear: External ear normal.      Nose: Nose normal.      Mouth/Throat:      Pharynx: Oropharynx is clear.   Eyes:      General: No scleral icterus.     Conjunctiva/sclera: Conjunctivae normal.      Pupils: Pupils are equal, round, and reactive to light.   Neck:      Musculoskeletal: Normal range of motion and neck supple.      Thyroid: No thyromegaly.      Vascular: No carotid bruit.   Cardiovascular:      Rate and Rhythm: Normal rate and regular rhythm.      Pulses: Normal pulses.      Heart sounds: Normal heart sounds. No murmur.   Pulmonary:       Effort: Pulmonary effort is normal. No respiratory distress.      Breath sounds: Normal breath sounds. No rales.   Musculoskeletal: Normal range of motion.         General: No deformity.      Right lower leg: Edema present.      Left lower leg: Edema present.      Comments: Trace pedal edema = bilat     Skin:     General: Skin is warm and dry.      Coloration: Skin is not jaundiced.      Findings: No rash.   Neurological:      General: No focal deficit present.      Mental Status: She is alert and oriented to person, place, and time. Mental status is at baseline.      Gait: Gait normal.   Psychiatric:         Mood and Affect: Mood normal.         Behavior: Behavior normal.         Thought Content: Thought content normal.         Judgment: Judgment normal.         Assessment/Plan   Diagnoses and all orders for this visit:    1. Benign essential hypertension (Primary)    2. Localized edema    3. Vitamin D deficiency    4. Neutrophilic leukocytosis    5. Primary hypothyroidism    6. Dyslipidemia    Other orders  -     metoprolol succinate XL (TOPROL-XL) 50 MG 24 hr tablet; Take 1 tablet by mouth Daily. For BP and palpitations  Dispense: 90 tablet; Refill: 3  -     atorvastatin (Lipitor) 10 MG tablet; Take 1 tablet by mouth Daily. For cholesterol  Dispense: 90 tablet; Refill: 3  -     lisinopril (PRINIVIL,ZESTRIL) 10 MG tablet; Take 1 tablet by mouth Daily. With HCTZ 25mg for BP  Dispense: 90 tablet; Refill: 1    check lab---renal and K+ d/t change ---added ACE and now on HCTZ    Plan, Onelia Alegre, was seen today.  she was seen for HTN and continue medication, Hyperlipidemia and will continue current medication, Hypothyroidism and under the care of Endocrinologist and Vitamin D deficiency and supplemented.    F/u 5 mos         Answers for HPI/ROS submitted by the patient on 2/18/2021   What is the primary reason for your visit?: Other  Please describe your symptoms.: Recheck from increasing medicine for high  blood pressure  Have you had these symptoms before?: Yes  How long have you been having these symptoms?: Greater than 2 weeks  Please list any medications you are currently taking for this condition.: Hydrochlorothiazide 25 mg, Lisinopril 10 mg

## 2021-02-24 DIAGNOSIS — D72.9 NEUTROPHILIC LEUKOCYTOSIS: Primary | ICD-10-CM

## 2021-02-24 LAB
BUN SERPL-MCNC: 15 MG/DL (ref 8–23)
BUN/CREAT SERPL: 18.1 (ref 7–25)
CALCIUM SERPL-MCNC: 10.1 MG/DL (ref 8.6–10.5)
CHLORIDE SERPL-SCNC: 101 MMOL/L (ref 98–107)
CO2 SERPL-SCNC: 28.3 MMOL/L (ref 22–29)
CREAT SERPL-MCNC: 0.83 MG/DL (ref 0.57–1)
GLUCOSE SERPL-MCNC: 97 MG/DL (ref 65–99)
POTASSIUM SERPL-SCNC: 4.4 MMOL/L (ref 3.5–5.2)
SODIUM SERPL-SCNC: 139 MMOL/L (ref 136–145)

## 2021-02-25 ENCOUNTER — LAB (OUTPATIENT)
Dept: LAB | Facility: HOSPITAL | Age: 71
End: 2021-02-25

## 2021-02-25 DIAGNOSIS — D72.9 NEUTROPHILIC LEUKOCYTOSIS: ICD-10-CM

## 2021-02-25 DIAGNOSIS — D72.829 LEUKOCYTOSIS, UNSPECIFIED TYPE: Primary | ICD-10-CM

## 2021-02-25 LAB
BASOPHILS # BLD AUTO: 0.08 10*3/MM3 (ref 0–0.2)
BASOPHILS NFR BLD AUTO: 0.7 % (ref 0–1.5)
DEPRECATED RDW RBC AUTO: 46.5 FL (ref 37–54)
EOSINOPHIL # BLD AUTO: 0.41 10*3/MM3 (ref 0–0.4)
EOSINOPHIL NFR BLD AUTO: 3.8 % (ref 0.3–6.2)
ERYTHROCYTE [DISTWIDTH] IN BLOOD BY AUTOMATED COUNT: 13 % (ref 12.3–15.4)
HCT VFR BLD AUTO: 42.7 % (ref 34–46.6)
HGB BLD-MCNC: 14.2 G/DL (ref 12–15.9)
IMM GRANULOCYTES # BLD AUTO: 0.04 10*3/MM3 (ref 0–0.05)
IMM GRANULOCYTES NFR BLD AUTO: 0.4 % (ref 0–0.5)
LYMPHOCYTES # BLD AUTO: 2.44 10*3/MM3 (ref 0.7–3.1)
LYMPHOCYTES NFR BLD AUTO: 22.4 % (ref 19.6–45.3)
MCH RBC QN AUTO: 32.6 PG (ref 26.6–33)
MCHC RBC AUTO-ENTMCNC: 33.3 G/DL (ref 31.5–35.7)
MCV RBC AUTO: 98.2 FL (ref 79–97)
MONOCYTES # BLD AUTO: 0.74 10*3/MM3 (ref 0.1–0.9)
MONOCYTES NFR BLD AUTO: 6.8 % (ref 5–12)
NEUTROPHILS NFR BLD AUTO: 65.9 % (ref 42.7–76)
NEUTROPHILS NFR BLD AUTO: 7.19 10*3/MM3 (ref 1.7–7)
NRBC BLD AUTO-RTO: 0 /100 WBC (ref 0–0.2)
PLATELET # BLD AUTO: 270 10*3/MM3 (ref 140–450)
PMV BLD AUTO: 9.6 FL (ref 6–12)
RBC # BLD AUTO: 4.35 10*6/MM3 (ref 3.77–5.28)
REF LAB TEST METHOD: NORMAL
WBC # BLD AUTO: 10.9 10*3/MM3 (ref 3.4–10.8)

## 2021-02-25 PROCEDURE — 85025 COMPLETE CBC W/AUTO DIFF WBC: CPT

## 2021-02-25 PROCEDURE — 88377 M/PHMTRC ALYS ISHQUANT/SEMIQ: CPT

## 2021-02-25 PROCEDURE — 36415 COLL VENOUS BLD VENIPUNCTURE: CPT

## 2021-03-01 LAB — REF LAB TEST METHOD: NORMAL

## 2021-03-02 ENCOUNTER — TELEPHONE (OUTPATIENT)
Dept: ONCOLOGY | Facility: CLINIC | Age: 71
End: 2021-03-02

## 2021-03-02 NOTE — TELEPHONE ENCOUNTER
Called pt and informed her of results. She v/u. She declined wanting to come in for additional lab work. She said she would just call back if she became symptomatic.     ----- Message from German Pemberton MD sent at 3/1/2021  9:26 PM EST -----  Please call the patient regarding her results. Please let her know there is no evidence for a myeloproliferative disorder. JAK2 negative. Flow cytometry normal. FISH for BCR-ABL negative. No reason to worry about her slightly elevated WBC. I think I also intended to evaluate for a bleeding disorder when she came in for these labs, but that aspect was missed since she had to come back for the labs. If she wants evaluation for a bleeding disorder, she'll have to come back in, and the labs are outlined in my office note. Otherwise, no need to follow up. Thanks, STEFFANIE

## 2021-03-09 DIAGNOSIS — Z23 IMMUNIZATION DUE: ICD-10-CM

## 2021-03-15 DIAGNOSIS — E03.9 PRIMARY HYPOTHYROIDISM: Primary | ICD-10-CM

## 2021-03-15 RX ORDER — LEVOTHYROXINE SODIUM 100 MCG
100 TABLET ORAL DAILY
Qty: 30 TABLET | Refills: 5 | Status: SHIPPED | OUTPATIENT
Start: 2021-03-15 | End: 2021-08-18

## 2021-03-30 ENCOUNTER — APPOINTMENT (OUTPATIENT)
Dept: WOMENS IMAGING | Facility: HOSPITAL | Age: 71
End: 2021-03-30

## 2021-03-30 PROCEDURE — 77080 DXA BONE DENSITY AXIAL: CPT | Performed by: RADIOLOGY

## 2021-03-30 PROCEDURE — 77063 BREAST TOMOSYNTHESIS BI: CPT | Performed by: RADIOLOGY

## 2021-03-30 PROCEDURE — 77067 SCR MAMMO BI INCL CAD: CPT | Performed by: RADIOLOGY

## 2021-04-08 RX ORDER — METOPROLOL SUCCINATE 50 MG/1
TABLET, EXTENDED RELEASE ORAL
Qty: 90 TABLET | Refills: 3 | Status: SHIPPED | OUTPATIENT
Start: 2021-04-08 | End: 2022-02-22 | Stop reason: SDUPTHER

## 2021-04-27 ENCOUNTER — TELEPHONE (OUTPATIENT)
Dept: FAMILY MEDICINE CLINIC | Facility: CLINIC | Age: 71
End: 2021-04-27

## 2021-04-27 RX ORDER — VALSARTAN 160 MG/1
TABLET ORAL
Qty: 90 TABLET | Refills: 1 | Status: SHIPPED | OUTPATIENT
Start: 2021-04-27 | End: 2021-07-23 | Stop reason: SDUPTHER

## 2021-04-27 NOTE — TELEPHONE ENCOUNTER
----- Message from Bernadette Plata sent at 4/27/2021 10:36 AM EDT -----  Regarding: FW: Prescription Question  Contact: [phone]    ----- Message -----  From: Onelia Alegre  Sent: 4/27/2021  10:31 AM EDT  To: [facility]  Subject: Prescription Question                            Raven,  This is Onelia Alegre. I have been coughing more and more since you increased my lisinopril. It’s a dry throat and tickling feeling. (My  says I need to do something about it. Lol! I think it’s getting on his nerves.)  I do believe it’s the medicine and I do remember you saying to let you know if I was coughing with it.  My blood pressure and swelling has been better since you switched me but I just don’t think I can take that drug.  Let me know what you want me to do.  Thanks,  Onelia

## 2021-07-14 RX ORDER — HYDROCHLOROTHIAZIDE 25 MG/1
TABLET ORAL
Qty: 90 TABLET | Refills: 1 | Status: SHIPPED | OUTPATIENT
Start: 2021-07-14 | End: 2022-01-06

## 2021-07-21 RX ORDER — ESTRADIOL 0.5 MG/1
0.5 TABLET ORAL DAILY
Qty: 90 TABLET | Refills: 1 | OUTPATIENT
Start: 2021-07-21

## 2021-07-23 ENCOUNTER — OFFICE VISIT (OUTPATIENT)
Dept: FAMILY MEDICINE CLINIC | Facility: CLINIC | Age: 71
End: 2021-07-23

## 2021-07-23 VITALS
RESPIRATION RATE: 16 BRPM | HEIGHT: 65 IN | WEIGHT: 171 LBS | SYSTOLIC BLOOD PRESSURE: 110 MMHG | OXYGEN SATURATION: 96 % | HEART RATE: 92 BPM | TEMPERATURE: 97.5 F | BODY MASS INDEX: 28.49 KG/M2 | DIASTOLIC BLOOD PRESSURE: 70 MMHG

## 2021-07-23 DIAGNOSIS — I10 BENIGN ESSENTIAL HYPERTENSION: Primary | ICD-10-CM

## 2021-07-23 DIAGNOSIS — R00.2 PALPITATIONS: ICD-10-CM

## 2021-07-23 DIAGNOSIS — R73.01 IMPAIRED FASTING GLUCOSE: ICD-10-CM

## 2021-07-23 DIAGNOSIS — Z00.00 MEDICARE ANNUAL WELLNESS VISIT, SUBSEQUENT: ICD-10-CM

## 2021-07-23 DIAGNOSIS — E78.5 DYSLIPIDEMIA: ICD-10-CM

## 2021-07-23 DIAGNOSIS — E55.9 VITAMIN D DEFICIENCY: ICD-10-CM

## 2021-07-23 DIAGNOSIS — N95.1 SYMPTOMATIC MENOPAUSAL OR FEMALE CLIMACTERIC STATES: ICD-10-CM

## 2021-07-23 DIAGNOSIS — D72.9 NEUTROPHILIC LEUKOCYTOSIS: ICD-10-CM

## 2021-07-23 PROCEDURE — G0439 PPPS, SUBSEQ VISIT: HCPCS | Performed by: PHYSICIAN ASSISTANT

## 2021-07-23 PROCEDURE — 96160 PT-FOCUSED HLTH RISK ASSMT: CPT | Performed by: PHYSICIAN ASSISTANT

## 2021-07-23 PROCEDURE — 99214 OFFICE O/P EST MOD 30 MIN: CPT | Performed by: PHYSICIAN ASSISTANT

## 2021-07-23 PROCEDURE — 1159F MED LIST DOCD IN RCRD: CPT | Performed by: PHYSICIAN ASSISTANT

## 2021-07-23 RX ORDER — PROGESTERONE 200 MG/1
CAPSULE ORAL
COMMUNITY
Start: 2021-04-22 | End: 2021-07-23 | Stop reason: SDDI

## 2021-07-23 RX ORDER — VALSARTAN 160 MG/1
TABLET ORAL
Qty: 90 TABLET | Refills: 1 | Status: SHIPPED | OUTPATIENT
Start: 2021-07-23 | End: 2022-01-19

## 2021-07-23 RX ORDER — ESTRADIOL 0.5 MG/1
0.5 TABLET ORAL DAILY
Qty: 90 TABLET | Refills: 1 | Status: SHIPPED | OUTPATIENT
Start: 2021-07-23 | End: 2021-12-14

## 2021-07-23 NOTE — PATIENT INSTRUCTIONS
Medicare Wellness  Personal Prevention Plan of Service     Date of Office Visit:  2021  Encounter Provider:  Raven Travis PA-C  Place of Service:  [facility]  Patient Name: Onelia Alegre  :  1950    As part of the Medicare Wellness portion of your visit today, we are providing you with this personalized preventive plan of services (PPPS). This plan is based upon recommendations of the [place] Preventive Services Task Force (USPSTF) and the Advisory Committee on Immunization Practices (ACIP).    This lists the preventive care services that should be considered, and provides dates of when you are due. Items listed as completed are up-to-date and do not require any further intervention.    Health Maintenance   Topic Date Due   • PAP SMEAR  2018   • ZOSTER VACCINE (3 of 3) 2018   • INFLUENZA VACCINE  10/01/2021   • LIPID PANEL  2022   • MAMMOGRAM  2022   • ANNUAL WELLNESS VISIT  2022   • DXA SCAN  2023   • COLORECTAL CANCER SCREENING  2023   • TDAP/TD VACCINES (3 - Td or Tdap) 2026   • COVID-19 Vaccine  Completed   • Pneumococcal Vaccine 65+  Completed   • HEPATITIS C SCREENING  Addressed       Orders Placed This Encounter   Procedures   • Comprehensive metabolic panel     Order Specific Question:   Release to patient     Answer:   Immediate   • Lipid panel   • Vitamin D 25 Hydroxy     Order Specific Question:   Release to patient     Answer:   Immediate   • Vitamin B12     Order Specific Question:   Release to patient     Answer:   Immediate   • Folate     Order Specific Question:   Release to patient     Answer:   Immediate   • Hemoglobin A1c     Order Specific Question:   Release to patient     Answer:   Immediate   • CBC & Differential     Order Specific Question:   Manual Differential     Answer:   No       No follow-ups on file.          Hypertension, Adult  High blood pressure (hypertension) is when the  "force of blood pumping through the arteries is too strong. The arteries are the blood vessels that carry blood from the heart throughout the body. Hypertension forces the heart to work harder to pump blood and may cause arteries to become narrow or stiff. Untreated or uncontrolled hypertension can cause a heart attack, heart failure, a stroke, kidney disease, and other problems.  A blood pressure reading consists of a higher number over a lower number. Ideally, your blood pressure should be below 120/80. The first (\"top\") number is called the systolic pressure. It is a measure of the pressure in your arteries as your heart beats. The second (\"bottom\") number is called the diastolic pressure. It is a measure of the pressure in your arteries as the heart relaxes.  What are the causes?  The exact cause of this condition is not known. There are some conditions that result in or are related to high blood pressure.  What increases the risk?  Some risk factors for high blood pressure are under your control. The following factors may make you more likely to develop this condition:  · Smoking.  · Having type 2 diabetes mellitus, high cholesterol, or both.  · Not getting enough exercise or physical activity.  · Being overweight.  · Having too much fat, sugar, calories, or salt (sodium) in your diet.  · Drinking too much alcohol.  Some risk factors for high blood pressure may be difficult or impossible to change. Some of these factors include:  · Having chronic kidney disease.  · Having a family history of high blood pressure.  · Age. Risk increases with age.  · Race. You may be at higher risk if you are .  · Gender. Men are at higher risk than women before age 45. After age 65, women are at higher risk than men.  · Having obstructive sleep apnea.  · Stress.  What are the signs or symptoms?  High blood pressure may not cause symptoms. Very high blood pressure (hypertensive crisis) may " cause:  · Headache.  · Anxiety.  · Shortness of breath.  · Nosebleed.  · Nausea and vomiting.  · Vision changes.  · Severe chest pain.  · Seizures.  How is this diagnosed?  This condition is diagnosed by measuring your blood pressure while you are seated, with your arm resting on a flat surface, your legs uncrossed, and your feet flat on the floor. The cuff of the blood pressure monitor will be placed directly against the skin of your upper arm at the level of your heart. It should be measured at least twice using the same arm. Certain conditions can cause a difference in blood pressure between your right and left arms.  Certain factors can cause blood pressure readings to be lower or higher than normal for a short period of time:  · When your blood pressure is higher when you are in a health care provider's office than when you are at home, this is called white coat hypertension. Most people with this condition do not need medicines.  · When your blood pressure is higher at home than when you are in a health care provider's office, this is called masked hypertension. Most people with this condition may need medicines to control blood pressure.  If you have a high blood pressure reading during one visit or you have normal blood pressure with other risk factors, you may be asked to:  · Return on a different day to have your blood pressure checked again.  · Monitor your blood pressure at home for 1 week or longer.  If you are diagnosed with hypertension, you may have other blood or imaging tests to help your health care provider understand your overall risk for other conditions.  How is this treated?  This condition is treated by making healthy lifestyle changes, such as eating healthy foods, exercising more, and reducing your alcohol intake. Your health care provider may prescribe medicine if lifestyle changes are not enough to get your blood pressure under control, and if:  · Your systolic blood pressure is above  130.  · Your diastolic blood pressure is above 80.  Your personal target blood pressure may vary depending on your medical conditions, your age, and other factors.  Follow these instructions at home:  Eating and drinking    · Eat a diet that is high in fiber and potassium, and low in sodium, added sugar, and fat. An example eating plan is called the DASH (Dietary Approaches to Stop Hypertension) diet. To eat this way:  ? Eat plenty of fresh fruits and vegetables. Try to fill one half of your plate at each meal with fruits and vegetables.  ? Eat whole grains, such as whole-wheat pasta, brown rice, or whole-grain bread. Fill about one fourth of your plate with whole grains.  ? Eat or drink low-fat dairy products, such as skim milk or low-fat yogurt.  ? Avoid fatty cuts of meat, processed or cured meats, and poultry with skin. Fill about one fourth of your plate with lean proteins, such as fish, chicken without skin, beans, eggs, or tofu.  ? Avoid pre-made and processed foods. These tend to be higher in sodium, added sugar, and fat.  · Reduce your daily sodium intake. Most people with hypertension should eat less than 1,500 mg of sodium a day.  · Do not drink alcohol if:  ? Your health care provider tells you not to drink.  ? You are pregnant, may be pregnant, or are planning to become pregnant.  · If you drink alcohol:  ? Limit how much you use to:  § 0-1 drink a day for women.  § 0-2 drinks a day for men.  ? Be aware of how much alcohol is in your drink. In the [place] one drink equals one 12 oz bottle of beer (355 mL), one 5 oz glass of wine (148 mL), or one 1½ oz glass of hard liquor (44 mL).  Lifestyle    · Work with your health care provider to maintain a healthy body weight or to lose weight. Ask what an ideal weight is for you.  · Get at least 30 minutes of exercise most days of the week. Activities may include walking, swimming, or biking.  · Include exercise to strengthen your muscles (resistance exercise),  such as Pilates or lifting weights, as part of your weekly exercise routine. Try to do these types of exercises for 30 minutes at least 3 days a week.  · Do not use any products that contain nicotine or tobacco, such as cigarettes, e-cigarettes, and chewing tobacco. If you need help quitting, ask your health care provider.  · Monitor your blood pressure at home as told by your health care provider.  · Keep all follow-up visits as told by your health care provider. This is important.  Medicines  · Take over-the-counter and prescription medicines only as told by your health care provider. Follow directions carefully. Blood pressure medicines must be taken as prescribed.  · Do not skip doses of blood pressure medicine. Doing this puts you at risk for problems and can make the medicine less effective.  · Ask your health care provider about side effects or reactions to medicines that you should watch for.  Contact a health care provider if you:  · Think you are having a reaction to a medicine you are taking.  · Have headaches that keep coming back (recurring).  · Feel dizzy.  · Have swelling in your ankles.  · Have trouble with your vision.  Get help right away if you:  · Develop a severe headache or confusion.  · Have unusual weakness or numbness.  · Feel faint.  · Have severe pain in your chest or abdomen.  · Vomit repeatedly.  · Have trouble breathing.  Summary  · Hypertension is when the force of blood pumping through your arteries is too strong. If this condition is not controlled, it may put you at risk for serious complications.  · Your personal target blood pressure may vary depending on your medical conditions, your age, and other factors. For most people, a normal blood pressure is less than 120/80.  · Hypertension is treated with lifestyle changes, medicines, or a combination of both. Lifestyle changes include losing weight, eating a healthy, low-sodium diet, exercising more, and limiting alcohol.  This  information is not intended to replace advice given to you by your health care provider. Make sure you discuss any questions you have with your health care provider.  Document Revised: 08/28/2019 Document Reviewed: 08/28/2019  Elsevier Patient Education © 2021 [employer]

## 2021-07-23 NOTE — PROGRESS NOTES
The ABCs of the Annual Wellness Visit  Subsequent Medicare Wellness Visit    Chief Complaint   Patient presents with   • Medicare Wellness-subsequent       Subjective   History of Present Illness:  Onelia Alegre is a 70 y.o. female who presents for a Subsequent Medicare Wellness Visit.    HEALTH RISK ASSESSMENT    Recent Hospitalizations:  No hospitalization(s) within the last year.    Current Medical Providers:  Patient Care Team:  Raven Travis PA-C as PCP - General (Family Medicine)  Pat Angel MD as Consulting Physician (Orthopedic Surgery)  Lew Farooq MD as Consulting Physician (Endocrinology)  Brendon Mendoza MD as Consulting Physician (Cardiology)  Raven Travis PA-C as Referring Physician (Family Medicine)  German Pemberton MD as Consulting Physician (Hematology and Oncology)    Smoking Status:  Social History     Tobacco Use   Smoking Status Never Smoker   Smokeless Tobacco Never Used       Alcohol Consumption:  Social History     Substance and Sexual Activity   Alcohol Use Yes    Comment: social       Depression Screen:   PHQ-2/PHQ-9 Depression Screening 7/23/2021   Little interest or pleasure in doing things 0   Feeling down, depressed, or hopeless 0   Trouble falling or staying asleep, or sleeping too much 0   Feeling tired or having little energy 0   Poor appetite or overeating 0   Feeling bad about yourself - or that you are a failure or have let yourself or your family down 0   Trouble concentrating on things, such as reading the newspaper or watching television 0   Moving or speaking so slowly that other people could have noticed. Or the opposite - being so fidgety or restless that you have been moving around a lot more than usual 0   Thoughts that you would be better off dead, or of hurting yourself in some way 0   Total Score 0   If you checked off any problems, how difficult have these problems made it for you to do your work, take care of things at home, or get along with  other people? Not difficult at all       Fall Risk Screen:  ROB Fall Risk Assessment was completed, and patient is at LOW risk for falls.Assessment completed on:7/23/2021    Health Habits and Functional and Cognitive Screening:  Functional & Cognitive Status 7/23/2021   Do you have difficulty preparing food and eating? No   Do you have difficulty bathing yourself, getting dressed or grooming yourself? No   Do you have difficulty using the toilet? No   Do you have difficulty moving around from place to place? No   Do you have trouble with steps or getting out of a bed or a chair? No   Current Diet Well Balanced Diet   Dental Exam Up to date   Eye Exam Not up to date   Exercise (times per week) 3 times per week   Current Exercises Include Walking   Current Exercise Activities Include -   Do you need help using the phone?  No   Are you deaf or do you have serious difficulty hearing?  No   Do you need help with transportation? No   Do you need help shopping? No   Do you need help preparing meals?  No   Do you need help with housework?  No   Do you need help with laundry? No   Do you need help taking your medications? No   Do you need help managing money? No   Do you ever drive or ride in a car without wearing a seat belt? No   Have you felt unusual stress, anger or loneliness in the last month? No   Who do you live with? Spouse   If you need help, do you have trouble finding someone available to you? No   Have you been bothered in the last four weeks by sexual problems? No   Do you have difficulty concentrating, remembering or making decisions? No         Does the patient have evidence of cognitive impairment? No    Asprin use counseling:Does not need ASA (and currently is not on it)    Age-appropriate Screening Schedule:  Refer to the list below for future screening recommendations based on patient's age, sex and/or medical conditions. Orders for these recommended tests are listed in the plan section. The patient  has been provided with a written plan.    Health Maintenance   Topic Date Due   • PAP SMEAR  08/25/2018   • ZOSTER VACCINE (3 of 3) 12/11/2018   • INFLUENZA VACCINE  10/01/2021   • LIPID PANEL  01/07/2022   • MAMMOGRAM  03/30/2022   • DXA SCAN  03/30/2023   • TDAP/TD VACCINES (3 - Td or Tdap) 12/07/2026          The following portions of the patient's history were reviewed and updated as appropriate: allergies, current medications, past family history, past medical history, past social history, past surgical history and problem list.    Outpatient Medications Prior to Visit   Medication Sig Dispense Refill   • atorvastatin (Lipitor) 10 MG tablet Take 1 tablet by mouth Daily. For cholesterol 90 tablet 3   • Calcium Carbonate-Vitamin D (CALCIUM-VITAMIN D) 600-200 MG-UNIT capsule Take by mouth.     • cetirizine (ZyrTEC) 10 MG tablet Take 10 mg by mouth daily. Take one tablet daily as needed     • cholecalciferol (VITAMIN D3) 1000 UNITS tablet Take 2,000 Units by mouth Daily.     • estradiol (Estrace) 0.5 MG tablet Take 1 tablet by mouth Daily. New dose 90 tablet 1   • hydroCHLOROthiazide (HYDRODIURIL) 25 MG tablet TAKE 1 TABLET BY MOUTH DAILY FOR BLOOD PRESSURE WITH LISINOPRIL (STOPPED DYAZIDE) 90 tablet 1   • melatonin tablet Take by mouth.     • metoprolol succinate XL (TOPROL-XL) 50 MG 24 hr tablet TAKE 1 TABLET EVERY DAY FOR BLOOD PRESSURE AND PALPITATIONS 90 tablet 3   • Multiple Vitamin (MULTIVITAMIN) tablet Take 1 tablet by mouth daily.     • progesterone (PROMETRIUM) 200 MG capsule Take 1 capsule by mouth Daily. 90 capsule 3   • Progesterone (PROMETRIUM) 200 MG capsule      • Synthroid 100 MCG tablet Take 1 tablet by mouth Daily. 30 tablet 5   • Turmeric 500 MG capsule      • valsartan (DIOVAN) 160 MG tablet One PO daily for BP----stop lisinopril.  ACE cough 90 tablet 1   • vitamin B-12 (CYANOCOBALAMIN) 1000 MCG tablet Take 1,000 mcg by mouth Daily.     • ciprofloxacin (Cipro) 250 MG tablet Take 1 tablet by  "mouth 2 (Two) Times a Day. 14 tablet 0     No facility-administered medications prior to visit.       Patient Active Problem List   Diagnosis   • Benign essential hypertension   • Osteopenia   • Excess weight   • Localized edema   • Thyroid function test abnormal   • Primary hypothyroidism   • Vitamin D deficiency   • Dyslipidemia   • Hyperuricemia   • Symptomatic menopausal or female climacteric states   • Renal insufficiency   • Palpitations   • Neutrophilic leukocytosis   • Easy bruising       Advanced Care Planning:  ACP discussion was held with the patient during this visit. Patient does not have an advance directive, information provided.    Review of Systems    Compared to one year ago, the patient feels her physical health is the same.  Compared to one year ago, the patient feels her mental health is the same.    Reviewed chart for potential of high risk medication in the elderly: yes  Reviewed chart for potential of harmful drug interactions in the elderly:yes    Objective         Vitals:    07/23/21 1023   BP: 115/65   BP Location: Left arm   Patient Position: Sitting   Cuff Size: Adult   Pulse: 92   Resp: 16   Temp: 97.5 °F (36.4 °C)   SpO2: 96%   Weight: 77.6 kg (171 lb)   Height: 165.1 cm (65\")       Body mass index is 28.46 kg/m².  Discussed the patient's BMI with her. The BMI is above average; BMI management plan is completed.    Physical Exam          Assessment/Plan   Medicare Risks and Personalized Health Plan  CMS Preventative Services Quick Reference  Cardiovascular risk    The above risks/problems have been discussed with the patient.  Pertinent information has been shared with the patient in the After Visit Summary.  Follow up plans and orders are seen below in the Assessment/Plan Section.    There are no diagnoses linked to this encounter.  Follow Up:  No follow-ups on file.     An After Visit Summary and PPPS were given to the patient.               "

## 2021-07-23 NOTE — PROGRESS NOTES
"Subjective   Onelia Alegre is a 70 y.o. female.     History of Present Illness      Since the last visit, she has overall felt well.  She has Essential Hypertension and well controlled on current medication, Impaired fasting glucose and will monitor labs to watch for DMII, Hyperlipidemia with goals met with current Rx, Hypothyroidism and remains under the care of their Endocrinologist and Vitamin D deficiency and labs are at goal >30 ng/mL.  she has been compliant with current medications have reviewed them.  The patient denies medication side effects.  Will refill medications. /65 (BP Location: Left arm, Patient Position: Sitting, Cuff Size: Adult)   Pulse 92   Temp 97.5 °F (36.4 °C)   Resp 16   Ht 165.1 cm (65\")   Wt 77.6 kg (171 lb)   LMP  (LMP Unknown)   SpO2 96%   BMI 28.46 kg/m²     Results for orders placed or performed in visit on 05/15/21   TSH    Specimen: Blood   Result Value Ref Range    TSH 1.360 0.270 - 4.200 uIU/mL   T4, Free    Specimen: Blood   Result Value Ref Range    Free T4 1.92 (H) 0.93 - 1.70 ng/dL     Walking for exercise  Weight down    Past notes of significant history for Actonel causing aching, on beta-blocker due to history palpitations.  Has seen Dr. Pemberton for leukocytosis monitoring  Sees Dr. Mendoza for cardiology due to history palpitations      Normal DEXA 3-30-21    The following portions of the patient's history were reviewed and updated as appropriate: allergies, current medications, past family history, past medical history, past social history, past surgical history and problem list.    Review of Systems   Constitutional: Negative for activity change, appetite change and unexpected weight change.   HENT: Negative for nosebleeds and trouble swallowing.    Eyes: Negative for pain and visual disturbance.   Respiratory: Negative for chest tightness, shortness of breath and wheezing.    Cardiovascular: Negative for chest pain and palpitations.   Gastrointestinal: " Negative for abdominal pain and blood in stool.   Endocrine: Negative.    Genitourinary: Negative for difficulty urinating and hematuria.   Musculoskeletal: Negative for joint swelling.   Skin: Negative for color change and rash.   Allergic/Immunologic: Negative.    Neurological: Negative for syncope and speech difficulty.   Hematological: Negative for adenopathy. Bruises/bleeds easily.   Psychiatric/Behavioral: Negative for agitation and confusion.   All other systems reviewed and are negative.      Objective   Physical Exam  Vitals and nursing note reviewed.   Constitutional:       General: She is not in acute distress.     Appearance: Normal appearance. She is well-developed. She is not ill-appearing or toxic-appearing.   HENT:      Head: Normocephalic.      Right Ear: External ear normal.      Left Ear: External ear normal.      Nose: Nose normal.      Mouth/Throat:      Pharynx: Oropharynx is clear.   Eyes:      General: No scleral icterus.     Conjunctiva/sclera: Conjunctivae normal.      Pupils: Pupils are equal, round, and reactive to light.   Neck:      Thyroid: No thyromegaly.      Vascular: No carotid bruit.   Cardiovascular:      Rate and Rhythm: Normal rate and regular rhythm.      Pulses: Normal pulses.      Heart sounds: Normal heart sounds. No murmur heard.     Pulmonary:      Effort: Pulmonary effort is normal. No respiratory distress.      Breath sounds: Normal breath sounds.   Musculoskeletal:         General: No deformity. Normal range of motion.      Cervical back: Normal range of motion and neck supple.      Right lower leg: No edema.      Left lower leg: No edema.   Skin:     General: Skin is warm and dry.      Coloration: Skin is not jaundiced.      Findings: No rash.   Neurological:      General: No focal deficit present.      Mental Status: She is alert and oriented to person, place, and time. Mental status is at baseline.      Sensory: No sensory deficit.      Gait: Gait normal.    Psychiatric:         Mood and Affect: Mood normal.         Behavior: Behavior normal.         Thought Content: Thought content normal.         Judgment: Judgment normal.         Assessment/Plan   Diagnoses and all orders for this visit:    1. Benign essential hypertension (Primary)  -     Comprehensive metabolic panel  -     Lipid panel  -     Vitamin D 25 Hydroxy  -     Vitamin B12  -     Folate  -     CBC & Differential  -     Hemoglobin A1c    2. Palpitations  -     Comprehensive metabolic panel  -     Lipid panel  -     Vitamin D 25 Hydroxy  -     Vitamin B12  -     Folate  -     CBC & Differential  -     Hemoglobin A1c    3. Vitamin D deficiency  -     Comprehensive metabolic panel  -     Lipid panel  -     Vitamin D 25 Hydroxy  -     Vitamin B12  -     Folate  -     CBC & Differential  -     Hemoglobin A1c    4. Dyslipidemia  -     Comprehensive metabolic panel  -     Lipid panel  -     Vitamin D 25 Hydroxy  -     Vitamin B12  -     Folate  -     CBC & Differential  -     Hemoglobin A1c    5. Neutrophilic leukocytosis  -     Comprehensive metabolic panel  -     Lipid panel  -     Vitamin D 25 Hydroxy  -     Vitamin B12  -     Folate  -     CBC & Differential  -     Hemoglobin A1c    6. Symptomatic menopausal or female climacteric states  -     Comprehensive metabolic panel  -     Lipid panel  -     Vitamin D 25 Hydroxy  -     Vitamin B12  -     Folate  -     CBC & Differential  -     Hemoglobin A1c    7. Medicare annual wellness visit, subsequent  -     Comprehensive metabolic panel  -     Lipid panel  -     Vitamin D 25 Hydroxy  -     Vitamin B12  -     Folate  -     CBC & Differential  -     Hemoglobin A1c    8. Impaired fasting glucose  -     Comprehensive metabolic panel  -     Lipid panel  -     Vitamin D 25 Hydroxy  -     Vitamin B12  -     Folate  -     CBC & Differential  -     Hemoglobin A1c        Try HRT QOD next and did lower dose last visit  Sees cardio for palpitations  Hematology monitoring  WBC---Dr Nilay Lopez for thyroid  Try Flonase for allergies  Plan, Onelia Alegre, was seen today.  she was seen for HTN and continue medication, Imparied fasting glucose and plan follow up labs, diet, and exercise, Hyperlipidemia and will continue current medication, Hypothyroidism and under the care of Endocrinologist and Vitamin D deficiency and supplemented.  Labs Dec

## 2021-07-27 DIAGNOSIS — E03.9 PRIMARY HYPOTHYROIDISM: Primary | ICD-10-CM

## 2021-07-31 LAB
BUN SERPL-MCNC: 19 MG/DL (ref 8–23)
BUN/CREAT SERPL: 19.8 (ref 7–25)
CALCIUM SERPL-MCNC: 10.3 MG/DL (ref 8.6–10.5)
CHLORIDE SERPL-SCNC: 100 MMOL/L (ref 98–107)
CO2 SERPL-SCNC: 28 MMOL/L (ref 22–29)
CREAT SERPL-MCNC: 0.96 MG/DL (ref 0.57–1)
GLUCOSE SERPL-MCNC: 90 MG/DL (ref 65–99)
POTASSIUM SERPL-SCNC: 4.5 MMOL/L (ref 3.5–5.2)
SODIUM SERPL-SCNC: 138 MMOL/L (ref 136–145)
T3FREE SERPL-MCNC: 2.5 PG/ML (ref 2–4.4)
T4 FREE SERPL-MCNC: 1.98 NG/DL (ref 0.93–1.7)
TSH SERPL DL<=0.005 MIU/L-ACNC: 1.12 UIU/ML (ref 0.27–4.2)

## 2021-08-13 ENCOUNTER — OFFICE VISIT (OUTPATIENT)
Dept: ENDOCRINOLOGY | Age: 71
End: 2021-08-13

## 2021-08-13 VITALS
HEART RATE: 75 BPM | WEIGHT: 168.8 LBS | SYSTOLIC BLOOD PRESSURE: 122 MMHG | HEIGHT: 66 IN | OXYGEN SATURATION: 97 % | DIASTOLIC BLOOD PRESSURE: 70 MMHG | BODY MASS INDEX: 27.13 KG/M2

## 2021-08-13 DIAGNOSIS — E78.5 DYSLIPIDEMIA: ICD-10-CM

## 2021-08-13 DIAGNOSIS — E03.9 PRIMARY HYPOTHYROIDISM: Primary | ICD-10-CM

## 2021-08-13 PROCEDURE — 99214 OFFICE O/P EST MOD 30 MIN: CPT | Performed by: INTERNAL MEDICINE

## 2021-08-13 NOTE — PROGRESS NOTES
"Chief Complaint  Chief Complaint   Patient presents with   • Hypothyroidism   FOLLOW UP/ HYPOTHYROIDISM    Subjective          History of Present Illness    Onelia Alegre 70 y.o. presents as a follow-up for the evaluation of hypothyroidism.    Patient was diagnosed with hypothyroidism 4-5 years ago.  Today in clinic patient reports she is on Synthroid 100 mcg oral daily.  She reports that her energy levels are good, she has lost about few pounds of weight with diet and exercise and with behavioral changes.  She also had history of palpitations at which time she has seen the cardiologist who has helped the patient with beta-blocker.  No complaints of palpitations at this time, no tremors or anxiety.    She is in menopause, however she is on estrogen and progesterone replacements through her GYN and she is currently tapering down on the dosage per the direction of the GYN    Reviewed primary care physician's/consulting physician documentation and lab results         I have reviewed the patient's allergies, medicines, past medical hx, family hx and social hx in detail.    Objective   Vital Signs:   /70   Pulse 75   Ht 167.6 cm (66\")   Wt 76.6 kg (168 lb 12.8 oz)   LMP  (LMP Unknown)   SpO2 97%   BMI 27.25 kg/m²   Physical Exam   General appearance - no distress  Eyes- anicteric sclera  Ear nose and throat-external ears and nose normal.    Respiratory-normal chest on inspection.  No respiratory distress noted.  Skin-no rashes.  Neuro-alert and oriented x3          Result Review :   The following data was reviewed by: Lauren Shoemaker MD on 08/13/2021:  Orders Only on 07/30/2021   Component Date Value Ref Range Status   • Glucose 07/30/2021 90  65 - 99 mg/dL Final   • BUN 07/30/2021 19  8 - 23 mg/dL Final   • Creatinine 07/30/2021 0.96  0.57 - 1.00 mg/dL Final   • eGFR Non African Am 07/30/2021 57* >60 mL/min/1.73 Final    Comment: The MDRD GFR formula is only valid for adults with stable  renal function " "between ages 18 and 70.     • eGFR  Am 07/30/2021 69  >60 mL/min/1.73 Final   • BUN/Creatinine Ratio 07/30/2021 19.8  7.0 - 25.0 Final   • Sodium 07/30/2021 138  136 - 145 mmol/L Final   • Potassium 07/30/2021 4.5  3.5 - 5.2 mmol/L Final   • Chloride 07/30/2021 100  98 - 107 mmol/L Final   • Total CO2 07/30/2021 28.0  22.0 - 29.0 mmol/L Final   • Calcium 07/30/2021 10.3  8.6 - 10.5 mg/dL Final   • Free T4 07/30/2021 1.98* 0.93 - 1.70 ng/dL Final    Results may be falsely increased if patient taking Biotin.   • TSH 07/30/2021 1.120  0.270 - 4.200 uIU/mL Final   • T3, Free 07/30/2021 2.5  2.0 - 4.4 pg/mL Final     Data reviewed: Dr. Farooq documentation       Results Review:    I reviewed the patient's new clinical results.     Assessment and Plan    Problem List Items Addressed This Visit        Other    Primary hypothyroidism - Primary    Dyslipidemia        Hypothyroidism  Noted that free T4 was mildly elevated.  Patient reports that she is not taking any biotin to interfere with the assay for the T4 elevation.  Would consider adjusting the dosage of Synthroid after repeating the blood work-up in 10weeks to 100 mcg 5 days a week and 88 mcg for 2 days a week.    Hyperlipidemia  Continue Lipitor 10 mg oral daily.    Patient was seen by Mireya nurse practitioner prior to me.  All the above problems are new for me    Interpreted the blood work-up/imaging results performed by the primary care/consulting physician -    Refills sent to pharmacy    Follow Up     Patient was given instructions and counseling regarding her condition or for health maintenance advice. Please see specific information pulled into the AVS if appropriate.       Thank you for asking me to see your patient, Onelia Alegre in consultation.         Lauren Shoemaker MD  08/13/21      EM[facility] Dragon / transcription disclaimer:     \"Dictated utilizing Dragon dictation\".              "

## 2021-08-18 DIAGNOSIS — E03.9 PRIMARY HYPOTHYROIDISM: ICD-10-CM

## 2021-08-18 RX ORDER — LEVOTHYROXINE SODIUM 100 MCG
TABLET ORAL
Qty: 90 TABLET | Refills: 1 | Status: SHIPPED | OUTPATIENT
Start: 2021-08-18 | End: 2022-01-05 | Stop reason: SDUPTHER

## 2021-10-27 RX ORDER — LEVOTHYROXINE SODIUM 88 MCG
TABLET ORAL
Qty: 30 TABLET | Refills: 0 | Status: SHIPPED | OUTPATIENT
Start: 2021-10-27 | End: 2021-12-14

## 2021-10-27 RX ORDER — LEVOTHYROXINE SODIUM 88 MCG
TABLET ORAL
Qty: 90 TABLET | Refills: 0 | Status: SHIPPED | OUTPATIENT
Start: 2021-10-27 | End: 2022-01-05 | Stop reason: SDUPTHER

## 2021-12-14 ENCOUNTER — OFFICE VISIT (OUTPATIENT)
Dept: FAMILY MEDICINE CLINIC | Facility: CLINIC | Age: 71
End: 2021-12-14

## 2021-12-14 VITALS
TEMPERATURE: 97.5 F | WEIGHT: 168 LBS | DIASTOLIC BLOOD PRESSURE: 68 MMHG | OXYGEN SATURATION: 97 % | HEIGHT: 66 IN | BODY MASS INDEX: 27 KG/M2 | HEART RATE: 73 BPM | RESPIRATION RATE: 16 BRPM | SYSTOLIC BLOOD PRESSURE: 141 MMHG

## 2021-12-14 DIAGNOSIS — Z01.818 PREOPERATIVE CLEARANCE: Primary | ICD-10-CM

## 2021-12-14 PROCEDURE — 99212 OFFICE O/P EST SF 10 MIN: CPT | Performed by: NURSE PRACTITIONER

## 2021-12-14 NOTE — PROGRESS NOTES
Subjective   Onelia Alegre is a 70 y.o. female.     History of Present Illness   Patient presents to office for preop clearance. She is scheduled to have right total knee arthroplasty with Dr. Stanford on 1/20/22.  She has labs scheduled on 1/17. Patient had recent labs in October per Dr. Shoemaker.  A1c is 5.6, LDL 71. Patient reports feeling well and denies any complaint other than knee pain.  She is UTD on vaccinations.   The following portions of the patient's history were reviewed and updated as appropriate: allergies, current medications, past family history, past medical history, past social history, past surgical history and problem list.    Review of Systems   Constitutional: Negative for unexpected weight change.   Respiratory: Negative for shortness of breath.    Cardiovascular: Negative for chest pain and palpitations.   Endocrine: Negative for cold intolerance, heat intolerance, polydipsia, polyphagia and polyuria.   Psychiatric/Behavioral: Negative for behavioral problems.       Objective   Physical Exam  Vitals and nursing note reviewed.   Constitutional:       Appearance: Normal appearance. She is well-developed.   Neck:      Thyroid: No thyromegaly.      Vascular: No carotid bruit.   Cardiovascular:      Rate and Rhythm: Normal rate and regular rhythm.   Pulmonary:      Effort: Pulmonary effort is normal.      Breath sounds: Normal breath sounds.   Neurological:      Mental Status: She is alert and oriented to person, place, and time.   Psychiatric:         Mood and Affect: Mood normal.         Behavior: Behavior normal.         Thought Content: Thought content normal.         Judgment: Judgment normal.         Assessment/Plan   Diagnoses and all orders for this visit:    1. Preoperative clearance (Primary)

## 2022-01-05 DIAGNOSIS — E03.9 PRIMARY HYPOTHYROIDISM: ICD-10-CM

## 2022-01-05 RX ORDER — LEVOTHYROXINE SODIUM 88 MCG
TABLET ORAL
Qty: 24 TABLET | Refills: 1 | Status: SHIPPED | OUTPATIENT
Start: 2022-01-05 | End: 2022-01-18

## 2022-01-05 RX ORDER — LEVOTHYROXINE SODIUM 100 MCG
TABLET ORAL
Qty: 60 TABLET | Refills: 1 | Status: SHIPPED | OUTPATIENT
Start: 2022-01-05 | End: 2022-09-20

## 2022-01-06 RX ORDER — HYDROCHLOROTHIAZIDE 25 MG/1
TABLET ORAL
Qty: 90 TABLET | Refills: 0 | Status: SHIPPED | OUTPATIENT
Start: 2022-01-06 | End: 2022-01-19

## 2022-01-18 RX ORDER — LEVOTHYROXINE SODIUM 88 MCG
TABLET ORAL
Qty: 24 TABLET | Refills: 1 | Status: SHIPPED | OUTPATIENT
Start: 2022-01-18 | End: 2022-07-28

## 2022-01-19 DIAGNOSIS — E03.9 PRIMARY HYPOTHYROIDISM: ICD-10-CM

## 2022-01-19 RX ORDER — HYDROCHLOROTHIAZIDE 25 MG/1
TABLET ORAL
Qty: 90 TABLET | Refills: 0 | Status: SHIPPED | OUTPATIENT
Start: 2022-01-19 | End: 2022-02-22 | Stop reason: SDUPTHER

## 2022-01-19 RX ORDER — VALSARTAN 160 MG/1
TABLET ORAL
Qty: 90 TABLET | Refills: 0 | Status: SHIPPED | OUTPATIENT
Start: 2022-01-19 | End: 2022-02-22 | Stop reason: SDUPTHER

## 2022-02-22 ENCOUNTER — OFFICE VISIT (OUTPATIENT)
Dept: FAMILY MEDICINE CLINIC | Facility: CLINIC | Age: 72
End: 2022-02-22

## 2022-02-22 VITALS
DIASTOLIC BLOOD PRESSURE: 79 MMHG | HEART RATE: 76 BPM | TEMPERATURE: 97.5 F | SYSTOLIC BLOOD PRESSURE: 121 MMHG | HEIGHT: 66 IN | BODY MASS INDEX: 26.84 KG/M2 | RESPIRATION RATE: 16 BRPM | WEIGHT: 167 LBS | OXYGEN SATURATION: 98 %

## 2022-02-22 DIAGNOSIS — D72.9 NEUTROPHILIC LEUKOCYTOSIS: ICD-10-CM

## 2022-02-22 DIAGNOSIS — I10 BENIGN ESSENTIAL HYPERTENSION: Primary | ICD-10-CM

## 2022-02-22 DIAGNOSIS — E03.9 PRIMARY HYPOTHYROIDISM: ICD-10-CM

## 2022-02-22 DIAGNOSIS — E78.2 MIXED HYPERLIPIDEMIA: ICD-10-CM

## 2022-02-22 DIAGNOSIS — Z12.31 ENCOUNTER FOR SCREENING MAMMOGRAM FOR BREAST CANCER: ICD-10-CM

## 2022-02-22 DIAGNOSIS — E55.9 VITAMIN D DEFICIENCY: ICD-10-CM

## 2022-02-22 DIAGNOSIS — M85.89 OSTEOPENIA OF MULTIPLE SITES: ICD-10-CM

## 2022-02-22 DIAGNOSIS — R00.2 PALPITATIONS: ICD-10-CM

## 2022-02-22 PROBLEM — M17.11 ARTHRITIS OF KNEE, RIGHT: Status: ACTIVE | Noted: 2021-12-08

## 2022-02-22 PROBLEM — E78.5 HYPERLIPIDEMIA: Status: ACTIVE | Noted: 2022-01-21

## 2022-02-22 PROBLEM — M17.11 PRIMARY OSTEOARTHRITIS OF RIGHT KNEE: Status: ACTIVE | Noted: 2022-01-20

## 2022-02-22 PROCEDURE — 99214 OFFICE O/P EST MOD 30 MIN: CPT | Performed by: PHYSICIAN ASSISTANT

## 2022-02-22 RX ORDER — HYDROCHLOROTHIAZIDE 25 MG/1
25 TABLET ORAL DAILY
Qty: 90 TABLET | Refills: 1 | Status: SHIPPED | OUTPATIENT
Start: 2022-02-22 | End: 2022-07-10

## 2022-02-22 RX ORDER — VALSARTAN 160 MG/1
TABLET ORAL
Qty: 90 TABLET | Refills: 1 | Status: SHIPPED | OUTPATIENT
Start: 2022-02-22 | End: 2022-07-08

## 2022-02-22 RX ORDER — METOPROLOL SUCCINATE 50 MG/1
50 TABLET, EXTENDED RELEASE ORAL DAILY
Qty: 90 TABLET | Refills: 3 | Status: SHIPPED | OUTPATIENT
Start: 2022-02-22 | End: 2022-08-29 | Stop reason: SDUPTHER

## 2022-02-22 RX ORDER — ATORVASTATIN CALCIUM 10 MG/1
10 TABLET, FILM COATED ORAL DAILY
Qty: 90 TABLET | Refills: 3 | Status: SHIPPED | OUTPATIENT
Start: 2022-02-22 | End: 2022-08-29 | Stop reason: SDUPTHER

## 2022-02-22 NOTE — PROGRESS NOTES
"Subjective   Onelia Alegre is a 71 y.o. female.     History of Present Illness      Since the last visit, she has overall felt well.  She has Primary Hypertension and well controlled on current medication, Hyperlipidemia with goals met with current Rx, Hypothyroidism and remains under the care of their specialist and Vitamin D deficiency and labs are at goal >30 ng/mL.  she has been compliant with current medications have reviewed them.  The patient denies medication side effects.  Will refill medications. /79 (BP Location: Left arm, Patient Position: Sitting, Cuff Size: Adult)   Pulse 76   Temp 97.5 °F (36.4 °C)   Resp 16   Ht 167.6 cm (65.98\")   Wt 75.8 kg (167 lb)   LMP  (LMP Unknown)   SpO2 98%   BMI 26.97 kg/m²     Results for orders placed or performed in visit on 09/24/21   T3, Free    Specimen: Blood   Result Value Ref Range    T3, Free 3.4 2.0 - 4.4 pg/mL   T4, Free    Specimen: Blood   Result Value Ref Range    Free T4 2.09 (H) 0.82 - 1.77 ng/dL   TSH    Specimen: Blood   Result Value Ref Range    TSH 0.826 0.450 - 4.500 uIU/mL   Comprehensive Metabolic Panel   Result Value Ref Range    Glucose 93 65 - 99 mg/dL    BUN 16 8 - 27 mg/dL    Creatinine 0.93 0.57 - 1.00 mg/dL    eGFR Non African Am 62 >59 mL/min/1.73    eGFR African Am 71 >59 mL/min/1.73    BUN/Creatinine Ratio 17 12 - 28    Sodium 140 134 - 144 mmol/L    Potassium 4.2 3.5 - 5.2 mmol/L    Chloride 100 96 - 106 mmol/L    Total CO2 28 20 - 29 mmol/L    Calcium 10.2 8.7 - 10.3 mg/dL    Total Protein 6.9 6.0 - 8.5 g/dL    Albumin 4.4 3.7 - 4.7 g/dL    Globulin 2.5 1.5 - 4.5 g/dL    A/G Ratio 1.8 1.2 - 2.2    Total Bilirubin 1.1 0.0 - 1.2 mg/dL    Alkaline Phosphatase 75 44 - 121 IU/L    AST (SGOT) 19 0 - 40 IU/L    ALT (SGPT) 15 0 - 32 IU/L   Lipid Panel   Result Value Ref Range    Total Cholesterol 148 100 - 199 mg/dL    Triglycerides 131 0 - 149 mg/dL    HDL Cholesterol 54 >39 mg/dL    VLDL Cholesterol Tevin 23 5 - 40 mg/dL    LDL " Chol Calc (NIH) 71 0 - 99 mg/dL   Hemoglobin A1c   Result Value Ref Range    Hemoglobin A1C 5.6 4.8 - 5.6 %   Folate   Result Value Ref Range    Folate >20.0 >3.0 ng/mL   Vitamin D 25 Hydroxy   Result Value Ref Range    25 Hydroxy, Vitamin D 79.9 30.0 - 100.0 ng/mL   Vitamin B12   Result Value Ref Range    Vitamin B-12 694 232 - 1,245 pg/mL   Cardiovascular Risk Assessment   Result Value Ref Range    Interpretation Note    CBC & Differential   Result Value Ref Range    WBC 9.0 3.4 - 10.8 x10E3/uL    RBC 4.18 3.77 - 5.28 x10E6/uL    Hemoglobin 13.8 11.1 - 15.9 g/dL    Hematocrit 40.5 34.0 - 46.6 %    MCV 97 79 - 97 fL    MCH 33.0 26.6 - 33.0 pg    MCHC 34.1 31.5 - 35.7 g/dL    RDW 12.2 11.7 - 15.4 %    Platelets 273 150 - 450 x10E3/uL    Neutrophil Rel % 56 Not Estab. %    Lymphocyte Rel % 32 Not Estab. %    Monocyte Rel % 8 Not Estab. %    Eosinophil Rel % 3 Not Estab. %    Basophil Rel % 1 Not Estab. %    Neutrophils Absolute 5.1 1.4 - 7.0 x10E3/uL    Lymphocytes Absolute 2.8 0.7 - 3.1 x10E3/uL    Monocytes Absolute 0.7 0.1 - 0.9 x10E3/uL    Eosinophils Absolute 0.3 0.0 - 0.4 x10E3/uL    Basophils Absolute 0.1 0.0 - 0.2 x10E3/uL    Immature Granulocyte Rel % 0 Not Estab. %    Immature Grans Absolute 0.0 0.0 - 0.1 x10E3/uL           Past notes of significant history for Actonel causing aching, on beta-blocker due to history palpitations.  Has seen Dr. Pemberton for leukocytosis--f/u PRN  Sees Dr. Mendoza for cardiology due to history palpitations---last note f/u PRN        Normal DEXA 3-30-21     Right knee replaced Dec DR Stanford    The following portions of the patient's history were reviewed and updated as appropriate: allergies, current medications, past family history, past medical history, past social history, past surgical history and problem list.    Review of Systems   Constitutional: Negative for activity change, appetite change and unexpected weight change.   HENT: Negative for nosebleeds and trouble swallowing.     Eyes: Negative for pain and visual disturbance.   Respiratory: Negative for chest tightness, shortness of breath and wheezing.    Cardiovascular: Negative for chest pain and palpitations.   Gastrointestinal: Negative for abdominal pain and blood in stool.   Endocrine: Negative.    Genitourinary: Negative for difficulty urinating and hematuria.   Musculoskeletal: Negative for joint swelling.   Skin: Negative for color change and rash.   Allergic/Immunologic: Negative.    Neurological: Negative for syncope and speech difficulty.   Hematological: Negative for adenopathy.   Psychiatric/Behavioral: Negative for agitation, confusion and dysphoric mood.   All other systems reviewed and are negative.      Objective   Physical Exam  Vitals and nursing note reviewed.   Constitutional:       General: She is not in acute distress.     Appearance: Normal appearance. She is well-developed. She is not ill-appearing or toxic-appearing.   HENT:      Head: Normocephalic.      Right Ear: External ear normal.      Left Ear: External ear normal.      Nose: Nose normal.      Mouth/Throat:      Pharynx: Oropharynx is clear.   Eyes:      General: No scleral icterus.     Conjunctiva/sclera: Conjunctivae normal.      Pupils: Pupils are equal, round, and reactive to light.   Neck:      Thyroid: No thyromegaly.   Cardiovascular:      Rate and Rhythm: Normal rate and regular rhythm.      Heart sounds: Normal heart sounds. No murmur heard.      Pulmonary:      Effort: Pulmonary effort is normal. No respiratory distress.      Breath sounds: Normal breath sounds.   Musculoskeletal:         General: No deformity. Normal range of motion.      Cervical back: Normal range of motion and neck supple.   Skin:     General: Skin is warm and dry.      Findings: No rash.   Neurological:      General: No focal deficit present.      Mental Status: She is alert and oriented to person, place, and time. Mental status is at baseline.   Psychiatric:         Mood  and Affect: Mood normal.         Behavior: Behavior normal.         Thought Content: Thought content normal.         Judgment: Judgment normal.         Assessment/Plan   Diagnoses and all orders for this visit:    1. Benign essential hypertension (Primary)    2. Primary hypothyroidism    3. Vitamin D deficiency    4. Neutrophilic leukocytosis    5. Mixed hyperlipidemia    6. Palpitations    7. Osteopenia of multiple sites    8. Encounter for screening mammogram for breast cancer  -     Mammo Screening Digital Tomosynthesis Bilateral With CAD; Future    Other orders  -     metoprolol succinate XL (TOPROL-XL) 50 MG 24 hr tablet; Take 1 tablet by mouth Daily. For bp and palpitations  Dispense: 90 tablet; Refill: 3  -     hydroCHLOROthiazide (HYDRODIURIL) 25 MG tablet; Take 1 tablet by mouth Daily. For BP  Dispense: 90 tablet; Refill: 1  -     atorvastatin (Lipitor) 10 MG tablet; Take 1 tablet by mouth Daily. For cholesterol  Dispense: 90 tablet; Refill: 3  -     valsartan (DIOVAN) 160 MG tablet; TAKE 1 TABLET EVERY DAY FOR BLOOD PRESSURE  Dispense: 90 tablet; Refill: 1        Watching WBC---stable and saw DR Pemberton ---f/u prn  Stable palpitations on Toprol and has seen cardio--DR Mendoza; this still works can controls it  Plan, Onelia LIND Codey, was seen today.  she was seen for HTN and continue medication, Hyperlipidemia and will continue current medication, Hypothyroidism and under the care of specialist and Vitamin D deficiency and supplemented.  Cont DANNY works  Has labs with endocrine 1 mo and will make sure K+ in range         Answers for HPI/ROS submitted by the patient on 2/15/2022  Please describe your symptoms.: Check up for medicines for blood pressure, etc.  Have you had these symptoms before?: Yes  How long have you been having these symptoms?: Greater than 2 weeks  Please list any medications you are currently taking for this condition.: Medicine on file  What is the primary reason for your visit?:  Other

## 2022-03-15 ENCOUNTER — OFFICE VISIT (OUTPATIENT)
Dept: ENDOCRINOLOGY | Age: 72
End: 2022-03-15

## 2022-03-15 VITALS
HEART RATE: 69 BPM | WEIGHT: 170 LBS | BODY MASS INDEX: 27.32 KG/M2 | HEIGHT: 66 IN | DIASTOLIC BLOOD PRESSURE: 73 MMHG | OXYGEN SATURATION: 93 % | SYSTOLIC BLOOD PRESSURE: 133 MMHG

## 2022-03-15 DIAGNOSIS — E03.9 PRIMARY HYPOTHYROIDISM: Primary | ICD-10-CM

## 2022-03-15 DIAGNOSIS — E78.5 DYSLIPIDEMIA: ICD-10-CM

## 2022-03-15 PROCEDURE — 99214 OFFICE O/P EST MOD 30 MIN: CPT | Performed by: NURSE PRACTITIONER

## 2022-03-15 NOTE — PROGRESS NOTES
"Chief Complaint  Hypothyroidism    Subjective          Onelia Alegre presents to [facility] ENDOCRINOLOGY  History of Present Illness     Patient was diagnosed with hypothyroidism ABOUT 5 years ago.  Today in clinic patient reports she is on Synthroid 100 mcg mon-Friday and 88mcg sat and sun.  Patient had previously been on 100 mcg 7 days a week with elevated T4.  Palpitations well controlled on beta-blocker  Lab Results   Component Value Date    TSH 1.330 03/08/2022       Hyperlipidemia  Continue Lipitor 10 mg oral daily.     Lab Results   Component Value Date    CHLPL 147 03/08/2022    TRIG 127 03/08/2022    HDL 55 03/08/2022    LDL 70 03/08/2022       Objective   Vital Signs:   /73   Pulse 69   Ht 167.6 cm (66\")   Wt 77.1 kg (170 lb)   SpO2 93%   BMI 27.44 kg/m²     Physical Exam  Vitals reviewed.   Constitutional:       General: She is not in acute distress.  HENT:      Head: Normocephalic and atraumatic.   Cardiovascular:      Rate and Rhythm: Normal rate and regular rhythm.   Pulmonary:      Effort: Pulmonary effort is normal. No respiratory distress.   Musculoskeletal:         General: No signs of injury. Normal range of motion.      Cervical back: Normal range of motion and neck supple.   Skin:     General: Skin is warm and dry.   Neurological:      Mental Status: She is alert and oriented to person, place, and time. Mental status is at baseline.   Psychiatric:         Mood and Affect: Mood normal.         Behavior: Behavior normal.         Thought Content: Thought content normal.         Judgment: Judgment normal.        Result Review :   The following data was reviewed by: EMI Arteaga on 03/15/2022:  Common labs    Common Labsle 12/22/21 1/20/22 3/8/22 3/8/22      0935 0935   Glucose   107 (A)    BUN   18    Creatinine   0.91    Sodium   140    Potassium  3.3 (A) 4.6    Chloride   100    Calcium   10.1    WBC 8.86      Hemoglobin 14.2      Hematocrit 41.8    "   Platelets 260      Total Cholesterol    147   Triglycerides    127   HDL Cholesterol    55   LDL Cholesterol     70   (A) Abnormal value                      Assessment and Plan    Diagnoses and all orders for this visit:    1. Primary hypothyroidism (Primary)  -     Comprehensive Metabolic Panel; Future  -     TSH; Future  -     T4, Free; Future  -     T3, Free; Future  -     Lipid Panel; Future    2. Dyslipidemia  -     Comprehensive Metabolic Panel; Future  -     TSH; Future  -     T4, Free; Future  -     T3, Free; Future  -     Lipid Panel; Future        Follow Up   Return in about 1 year (around 3/15/2023).     Continue current T4 dose, TSH normal with improved T4 values  Continue statin, LDL at goal    Patient was given instructions and counseling regarding her condition or for health maintenance advice. Please see specific information pulled into the AVS if appropriate.     Mireya Ely APRN

## 2022-04-14 ENCOUNTER — APPOINTMENT (OUTPATIENT)
Dept: WOMENS IMAGING | Facility: HOSPITAL | Age: 72
End: 2022-04-14

## 2022-04-14 PROCEDURE — 77067 SCR MAMMO BI INCL CAD: CPT | Performed by: RADIOLOGY

## 2022-04-14 PROCEDURE — 77063 BREAST TOMOSYNTHESIS BI: CPT | Performed by: RADIOLOGY

## 2022-07-08 RX ORDER — VALSARTAN 160 MG/1
TABLET ORAL
Qty: 90 TABLET | Refills: 0 | Status: SHIPPED | OUTPATIENT
Start: 2022-07-08 | End: 2022-08-29 | Stop reason: SDUPTHER

## 2022-07-10 RX ORDER — HYDROCHLOROTHIAZIDE 25 MG/1
TABLET ORAL
Qty: 90 TABLET | Refills: 0 | Status: SHIPPED | OUTPATIENT
Start: 2022-07-10 | End: 2022-08-29 | Stop reason: SDUPTHER

## 2022-07-28 RX ORDER — LEVOTHYROXINE SODIUM 88 MCG
TABLET ORAL
Qty: 24 TABLET | Refills: 1 | Status: SHIPPED | OUTPATIENT
Start: 2022-07-28 | End: 2023-03-16 | Stop reason: SDUPTHER

## 2022-08-29 ENCOUNTER — OFFICE VISIT (OUTPATIENT)
Dept: FAMILY MEDICINE CLINIC | Facility: CLINIC | Age: 72
End: 2022-08-29

## 2022-08-29 VITALS
WEIGHT: 176 LBS | OXYGEN SATURATION: 97 % | DIASTOLIC BLOOD PRESSURE: 80 MMHG | SYSTOLIC BLOOD PRESSURE: 114 MMHG | TEMPERATURE: 97.5 F | HEIGHT: 66 IN | BODY MASS INDEX: 28.28 KG/M2 | RESPIRATION RATE: 16 BRPM | HEART RATE: 70 BPM

## 2022-08-29 DIAGNOSIS — E55.9 VITAMIN D DEFICIENCY: ICD-10-CM

## 2022-08-29 DIAGNOSIS — I10 BENIGN ESSENTIAL HYPERTENSION: Primary | ICD-10-CM

## 2022-08-29 DIAGNOSIS — E78.2 MIXED HYPERLIPIDEMIA: ICD-10-CM

## 2022-08-29 DIAGNOSIS — D72.9 NEUTROPHILIC LEUKOCYTOSIS: ICD-10-CM

## 2022-08-29 DIAGNOSIS — R00.2 PALPITATIONS: ICD-10-CM

## 2022-08-29 DIAGNOSIS — E03.9 ACQUIRED HYPOTHYROIDISM: ICD-10-CM

## 2022-08-29 DIAGNOSIS — R73.01 IMPAIRED FASTING GLUCOSE: ICD-10-CM

## 2022-08-29 PROCEDURE — G0439 PPPS, SUBSEQ VISIT: HCPCS | Performed by: PHYSICIAN ASSISTANT

## 2022-08-29 PROCEDURE — 1159F MED LIST DOCD IN RCRD: CPT | Performed by: PHYSICIAN ASSISTANT

## 2022-08-29 PROCEDURE — 1170F FXNL STATUS ASSESSED: CPT | Performed by: PHYSICIAN ASSISTANT

## 2022-08-29 PROCEDURE — 99214 OFFICE O/P EST MOD 30 MIN: CPT | Performed by: PHYSICIAN ASSISTANT

## 2022-08-29 PROCEDURE — 96160 PT-FOCUSED HLTH RISK ASSMT: CPT | Performed by: PHYSICIAN ASSISTANT

## 2022-08-29 RX ORDER — HYDROCHLOROTHIAZIDE 25 MG/1
25 TABLET ORAL DAILY
Qty: 90 TABLET | Refills: 1 | Status: SHIPPED | OUTPATIENT
Start: 2022-08-29 | End: 2022-12-01

## 2022-08-29 RX ORDER — METOPROLOL SUCCINATE 50 MG/1
50 TABLET, EXTENDED RELEASE ORAL DAILY
Qty: 90 TABLET | Refills: 3 | Status: SHIPPED | OUTPATIENT
Start: 2022-08-29 | End: 2022-12-14

## 2022-08-29 RX ORDER — ATORVASTATIN CALCIUM 10 MG/1
10 TABLET, FILM COATED ORAL DAILY
Qty: 90 TABLET | Refills: 3 | Status: SHIPPED | OUTPATIENT
Start: 2022-08-29 | End: 2022-12-14

## 2022-08-29 RX ORDER — VALSARTAN 160 MG/1
TABLET ORAL
Qty: 90 TABLET | Refills: 1 | Status: SHIPPED | OUTPATIENT
Start: 2022-08-29 | End: 2022-12-01

## 2022-08-29 NOTE — PROGRESS NOTES
The ABCs of the Annual Wellness Visit  Subsequent Medicare Wellness Visit    Chief Complaint   Patient presents with   • Follow-up   • Hyperlipidemia   • Hypertension   • Hypothyroidism      Subjective    History of Present Illness:  Onelia Alegre is a 71 y.o. female who presents for a Subsequent Medicare Wellness Visit.    The following portions of the patient's history were reviewed and   updated as appropriate: allergies, current medications, past family history, past medical history, past social history, past surgical history and problem list.    Compared to one year ago, the patient feels her physical   health is the same.    Compared to one year ago, the patient feels her mental   health is the same.    Recent Hospitalizations:  She was admitted within the past 365 days at [place] to have knee replaced right [facility].       Current Medical Providers:  Patient Care Team:  Raven Travis PA-C as PCP - General (Family Medicine)  Pat Angel MD as Consulting Physician (Orthopedic Surgery)  Lew Farooq MD as Consulting Physician (Endocrinology)  Brendon Mendoza MD as Consulting Physician (Cardiology)  Raven Travis PA-C as Referring Physician (Family Medicine)  German Pemberton MD as Consulting Physician (Hematology and Oncology)  Dickson Stanford MD as Consulting Physician (Orthopedic Surgery)  Lauren Shoemaker MD as Consulting Physician (Endocrinology)    Outpatient Medications Prior to Visit   Medication Sig Dispense Refill   • atorvastatin (Lipitor) 10 MG tablet Take 1 tablet by mouth Daily. For cholesterol 90 tablet 3   • Calcium Carbonate-Vitamin D (CALCIUM-VITAMIN D) 600-200 MG-UNIT capsule Take by mouth.     • cetirizine (ZyrTEC) 10 MG tablet Take 10 mg by mouth Daily. Take one tablet daily as needed     • cholecalciferol (VITAMIN D3) 1000 UNITS tablet Take 2,000 Units by mouth Daily.     • hydroCHLOROthiazide (HYDRODIURIL) 25 MG tablet TAKE 1 TABLET EVERY DAY FOR BLOOD PRESSURE WITH  LISINOPRIL (STOPPED DYAZIDE) 90 tablet 0   • melatonin tablet Take by mouth.     • metoprolol succinate XL (TOPROL-XL) 50 MG 24 hr tablet Take 1 tablet by mouth Daily. For bp and palpitations 90 tablet 3   • Multiple Vitamin (MULTIVITAMIN) tablet Take 1 tablet by mouth daily.     • Synthroid 100 MCG tablet Take 1 tablet Mon- FRi 60 tablet 1   • Synthroid 88 MCG tablet TAKE 1 TABLET BY MOUTH SATURDAY AND SUNDAY 24 tablet 1   • Turmeric 500 MG capsule      • valsartan (DIOVAN) 160 MG tablet TAKE 1 TABLET EVERY DAY FOR BLOOD PRESSURE (STOP LISINOPRIL. KEEP APPOINTMENT) 90 tablet 0   • vitamin B-12 (CYANOCOBALAMIN) 1000 MCG tablet Take 1,000 mcg by mouth Daily.       No facility-administered medications prior to visit.       No opioid medication identified on active medication list. I have reviewed chart for other potential  high risk medication/s and harmful drug interactions in the elderly.          Aspirin is not on active medication list.  Aspirin use is not indicated based on review of current medical condition/s. Risk of harm outweighs potential benefits.  .    Patient Active Problem List   Diagnosis   • Benign essential hypertension   • Osteopenia   • Excess weight   • Localized edema   • Thyroid function test abnormal   • Primary hypothyroidism   • Vitamin D deficiency   • Dyslipidemia   • Hyperuricemia   • Symptomatic menopausal or female climacteric states   • Renal insufficiency   • Palpitations   • Neutrophilic leukocytosis   • Easy bruising   • Arthritis of knee, right   • Hyperlipidemia   • Hypertension   • Primary osteoarthritis of right knee   • Hypothyroidism     Advance Care Planning  Advance Directive is not on file.  ACP discussion was held with the patient during this visit. Patient does not have an advance directive, information provided.          Objective    Vitals:    08/29/22 1030   BP: 121/60   BP Location: Left arm   Patient Position: Sitting   Cuff Size: Adult   Pulse: 70   Resp: 16   Temp:  "97.5 °F (36.4 °C)   SpO2: 97%   Weight: 79.8 kg (176 lb)   Height: 167.6 cm (65.98\")     Estimated body mass index is 28.42 kg/m² as calculated from the following:    Height as of this encounter: 167.6 cm (65.98\").    Weight as of this encounter: 79.8 kg (176 lb).    BMI is >= 25 and <30. (Overweight) The following options were offered after discussion;: exercise counseling/recommendations      Does the patient have evidence of cognitive impairment? No    Physical Exam            HEALTH RISK ASSESSMENT    Smoking Status:  Social History     Tobacco Use   Smoking Status Never Smoker   Smokeless Tobacco Never Used     Alcohol Consumption:  Social History     Substance and Sexual Activity   Alcohol Use Yes    Comment: social     Fall Risk Screen:    STEADI Fall Risk Assessment was completed, and patient is at LOW risk for falls.Assessment completed on:8/29/2022    Depression Screening:  PHQ-2/PHQ-9 Depression Screening 8/29/2022   Retired PHQ-9 Total Score -   Retired Total Score -   Little Interest or Pleasure in Doing Things 0-->not at all   Feeling Down, Depressed or Hopeless 0-->not at all   PHQ-9: Brief Depression Severity Measure Score 0       Health Habits and Functional and Cognitive Screening:  Functional & Cognitive Status 8/29/2022   Do you have difficulty preparing food and eating? No   Do you have difficulty bathing yourself, getting dressed or grooming yourself? No   Do you have difficulty using the toilet? No   Do you have difficulty moving around from place to place? No   Do you have trouble with steps or getting out of a bed or a chair? No   Current Diet Well Balanced Diet   Dental Exam Up to date   Eye Exam Not up to date   Exercise (times per week) 2 times per week   Current Exercises Include Walking   Current Exercise Activities Include -   Do you need help using the phone?  No   Are you deaf or do you have serious difficulty hearing?  No   Do you need help with transportation? No   Do you need help " shopping? No   Do you need help preparing meals?  No   Do you need help with housework?  No   Do you need help with laundry? No   Do you need help taking your medications? No   Do you need help managing money? No   Do you ever drive or ride in a car without wearing a seat belt? No   Have you felt unusual stress, anger or loneliness in the last month? No   Who do you live with? Spouse   If you need help, do you have trouble finding someone available to you? No   Have you been bothered in the last four weeks by sexual problems? No   Do you have difficulty concentrating, remembering or making decisions? No       Age-appropriate Screening Schedule:  Refer to the list below for future screening recommendations based on patient's age, sex and/or medical conditions. Orders for these recommended tests are listed in the plan section. The patient has been provided with a written plan.    Health Maintenance   Topic Date Due   • PAP SMEAR  08/25/2018   • ZOSTER VACCINE (3 of 3) 12/11/2018   • INFLUENZA VACCINE  10/01/2022   • LIPID PANEL  03/08/2023   • DXA SCAN  03/30/2023   • MAMMOGRAM  04/14/2023   • TDAP/TD VACCINES (3 - Td or Tdap) 12/07/2026              Assessment & Plan   CMS Preventative Services Quick Reference  Risk Factors Identified During Encounter  Cardiovascular Disease  The above risks/problems have been discussed with the patient.  Follow up actions/plans if indicated are seen below in the Assessment/Plan Section.  Pertinent information has been shared with the patient in the After Visit Summary.    There are no diagnoses linked to this encounter.    Follow Up:   No follow-ups on file.     An After Visit Summary and PPPS were made available to the patient.               Answers for HPI/ROS submitted by the patient on 8/22/2022  Please describe your symptoms.: 6 month follow up  Have you had these symptoms before?: Yes  How long have you been having these symptoms?: Greater than 2 weeks  Please list any  medications you are currently taking for this condition.: All meds listed in file  Please describe any probable cause for these symptoms. : High blood pressure, Irregular heartbeat, Cholesterol check up  What is the primary reason for your visit?: Other

## 2022-08-29 NOTE — PATIENT INSTRUCTIONS
Medicare Wellness  Personal Prevention Plan of Service     Date of Office Visit:    Encounter Provider:  Raven Travis PA-C  Place of Service:  [facility]  Patient Name: Onelia Alegre  :  1950    As part of the Medicare Wellness portion of your visit today, we are providing you with this personalized preventive plan of services (PPPS). This plan is based upon recommendations of the [place] Preventive Services Task Force (USPSTF) and the Advisory Committee on Immunization Practices (ACIP).    This lists the preventive care services that should be considered, and provides dates of when you are due. Items listed as completed are up-to-date and do not require any further intervention.    Health Maintenance   Topic Date Due    INFLUENZA VACCINE  10/01/2022    LIPID PANEL  2023    DXA SCAN  2023    MAMMOGRAM  2023    COLORECTAL CANCER SCREENING  2023    ANNUAL WELLNESS VISIT  2023    TDAP/TD VACCINES (3 - Td or Tdap) 2026    COVID-19 Vaccine  Completed    Pneumococcal Vaccine 65+  Completed    HEPATITIS C SCREENING  Addressed    PAP SMEAR  Discontinued    ZOSTER VACCINE  Discontinued       Orders Placed This Encounter   Procedures    Comprehensive Metabolic Panel     Order Specific Question:   Release to patient     Answer:   Routine Release    Hemoglobin A1c     Order Specific Question:   Release to patient     Answer:   Routine Release    Vitamin B12     Order Specific Question:   Release to patient     Answer:   Routine Release    Folate     Order Specific Question:   Release to patient     Answer:   Routine Release    Vitamin D 25 Hydroxy     Order Specific Question:   Release to patient     Answer:   Routine Release    CBC & Differential     Order Specific Question:   Manual Differential     Answer:   No       No follow-ups on file.

## 2022-08-29 NOTE — PROGRESS NOTES
Subjective   Onelia Alegre is a 71 y.o. female.     History of Present Illness    Since the last visit, she has overall felt well.  She has Primary Hypertension and well controlled on current medication, Impaired fasting glucose and will monitor labs to watch for DMII, Hyperlipidemia with goals met with current Rx, Hypothyroidism and remains under the care of their specialist and Vitamin D deficiency and will update labs for continued management.  she has been compliant with current medications have reviewed them.  The patient denies medication side effects.  Will refill medications. LMP  (LMP Unknown)   Sees endocrine for thyroid  Walking for exercise    Results for orders placed or performed in visit on 02/09/22   TSH    Specimen: Blood   Result Value Ref Range    TSH 1.330 0.450 - 4.500 uIU/mL   T4, Free    Specimen: Blood   Result Value Ref Range    Free T4 1.91 (H) 0.82 - 1.77 ng/dL   T3, Free    Specimen: Blood   Result Value Ref Range    T3, Free 3.2 2.0 - 4.4 pg/mL   Basic Metabolic Panel    Specimen: Blood   Result Value Ref Range    Glucose 107 (H) 65 - 99 mg/dL    BUN 18 8 - 27 mg/dL    Creatinine 0.91 0.57 - 1.00 mg/dL    EGFR Result 67 >59 mL/min/1.73    BUN/Creatinine Ratio 20 12 - 28    Sodium 140 134 - 144 mmol/L    Potassium 4.6 3.5 - 5.2 mmol/L    Chloride 100 96 - 106 mmol/L    Total CO2 25 20 - 29 mmol/L    Calcium 10.1 8.7 - 10.3 mg/dL   Lipid Panel    Specimen: Blood   Result Value Ref Range    Total Cholesterol 147 100 - 199 mg/dL    Triglycerides 127 0 - 149 mg/dL    HDL Cholesterol 55 >39 mg/dL    VLDL Cholesterol Tevin 22 5 - 40 mg/dL    LDL Chol Calc (NIH) 70 0 - 99 mg/dL   Cardiovascular Risk Assessment   Result Value Ref Range    Interpretation Note    [facility] CKD Program   Result Value Ref Range    Interpretation Note        Lab Results   Component Value Date    WBC 8.86 12/22/2021    HGB 14.2 12/22/2021    HCT 41.8 12/22/2021    MCV 99.1 12/22/2021     12/22/2021   I will  update other labs.  She just saw endocrinology in follow-up 1 year we will update labs in October and include hemoglobin A1c, hepatic function panel, CBC to check on her leukocytes, B12, folate, D    ECG 12 Lead   Date/Time: 1/27/2021 4:11 PM  Performed by: Brendon Mendoza MD  Authorized by: Brendon Mendoza MD   Comparison: compared with previous ECG from 1/12/2021  Similar to previous ECG  Rhythm: sinus tachycardia  Conduction: left anterior fascicular block  Other findings: poor R wave progression    Clinical impression: abnormal EKG    DR Mendoza has f/u PRN    Patient has been seeing Ortho Dr. Stanford  Had visit with endocrinology 3/15/2022.  Practitioner noted that patient's thyroid labs had improved and LDL is at goal and continue same medication regimen follow-up 1 year    Normal DEXA 3-30-21     Right knee replaced Dec DR Stanford     Past notes of significant history for Actonel causing aching, on beta-blocker due to history palpitations.  Has seen Dr. Pemberton for leukocytosis--f/u PRN  Sees Dr. Mendoza for cardiology due to history palpitations---last note f/u PRN    The following portions of the patient's history were reviewed and updated as appropriate: allergies, current medications, past family history, past medical history, past social history, past surgical history and problem list.  Review of Systems   Constitutional: Negative for activity change, appetite change and unexpected weight change.   HENT: Negative for nosebleeds and trouble swallowing.    Eyes: Negative for pain and visual disturbance.   Respiratory: Negative for chest tightness, shortness of breath and wheezing.    Cardiovascular: Negative for chest pain and palpitations.   Gastrointestinal: Negative for abdominal pain and blood in stool.   Endocrine: Negative.    Genitourinary: Negative for difficulty urinating and hematuria.   Musculoskeletal: Negative for joint swelling.   Skin: Negative for color change and rash.    Allergic/Immunologic: Negative.    Neurological: Negative for syncope and speech difficulty.   Hematological: Negative for adenopathy.   Psychiatric/Behavioral: Negative for agitation and confusion.   All other systems reviewed and are negative.      Objective   Physical Exam  Vitals and nursing note reviewed.   Constitutional:       General: She is not in acute distress.     Appearance: Normal appearance. She is well-developed. She is not ill-appearing or toxic-appearing.   HENT:      Head: Normocephalic.      Right Ear: External ear normal.      Left Ear: External ear normal.      Nose: Nose normal.      Mouth/Throat:      Pharynx: Oropharynx is clear.   Eyes:      General: No scleral icterus.     Conjunctiva/sclera: Conjunctivae normal.      Pupils: Pupils are equal, round, and reactive to light.   Neck:      Thyroid: No thyromegaly.      Vascular: No carotid bruit.   Cardiovascular:      Rate and Rhythm: Normal rate and regular rhythm.      Heart sounds: Normal heart sounds. No murmur heard.  Pulmonary:      Effort: Pulmonary effort is normal. No respiratory distress.      Breath sounds: Normal breath sounds.   Musculoskeletal:         General: No deformity. Normal range of motion.      Cervical back: Normal range of motion and neck supple.   Skin:     General: Skin is warm and dry.      Findings: No rash.   Neurological:      General: No focal deficit present.      Mental Status: She is alert and oriented to person, place, and time. Mental status is at baseline.   Psychiatric:         Mood and Affect: Mood normal.         Behavior: Behavior normal.         Thought Content: Thought content normal.         Judgment: Judgment normal.         Assessment & Plan   The following portions of the patient's history were reviewed and updated as appropriate: allergies, current medications, past family history, past medical history, past social history, past surgical history and problem list.  .Plan, Onelia Alegre,  was seen today.  she was seen for HTN and continue medication, Imparied fasting glucose and plan follow up labs, diet, and exercise, Hyperlipidemia and will continue current medication, Hypothyroidism and under the care of specialist and Vitamin D deficiency and will update labs .    I will update other labs.  She just saw endocrinology in follow-up 1 year we will update labs in October and include hemoglobin A1c, hepatic function panel, CBC to check on her leukocytes, B12, folate, D         Answers for HPI/ROS submitted by the patient on 8/22/2022  Please describe your symptoms.: 6 month follow up  Have you had these symptoms before?: Yes  How long have you been having these symptoms?: Greater than 2 weeks  Please list any medications you are currently taking for this condition.: All meds listed in file  Please describe any probable cause for these symptoms. : High blood pressure, Irregular heartbeat, Cholesterol check up  What is the primary reason for your visit?: Other

## 2022-09-19 DIAGNOSIS — E03.9 PRIMARY HYPOTHYROIDISM: ICD-10-CM

## 2022-09-20 RX ORDER — LEVOTHYROXINE SODIUM 100 MCG
100 TABLET ORAL
Qty: 65 TABLET | Refills: 0 | Status: SHIPPED | OUTPATIENT
Start: 2022-09-20 | End: 2023-02-20

## 2022-10-24 LAB
25(OH)D3+25(OH)D2 SERPL-MCNC: 96.9 NG/ML (ref 30–100)
ALBUMIN SERPL-MCNC: 4.5 G/DL (ref 3.5–5.2)
ALBUMIN/GLOB SERPL: 2.1 G/DL
ALP SERPL-CCNC: 92 U/L (ref 39–117)
ALT SERPL-CCNC: 11 U/L (ref 1–33)
AST SERPL-CCNC: 19 U/L (ref 1–32)
BASOPHILS # BLD AUTO: 0.05 10*3/MM3 (ref 0–0.2)
BASOPHILS NFR BLD AUTO: 0.7 % (ref 0–1.5)
BILIRUB SERPL-MCNC: 1 MG/DL (ref 0–1.2)
BUN SERPL-MCNC: 19 MG/DL (ref 8–23)
BUN/CREAT SERPL: 19.4 (ref 7–25)
CALCIUM SERPL-MCNC: 10.4 MG/DL (ref 8.6–10.5)
CHLORIDE SERPL-SCNC: 103 MMOL/L (ref 98–107)
CO2 SERPL-SCNC: 31 MMOL/L (ref 22–29)
CREAT SERPL-MCNC: 0.98 MG/DL (ref 0.57–1)
EGFRCR SERPLBLD CKD-EPI 2021: 61.1 ML/MIN/1.73
EOSINOPHIL # BLD AUTO: 0.3 10*3/MM3 (ref 0–0.4)
EOSINOPHIL NFR BLD AUTO: 4.4 % (ref 0.3–6.2)
ERYTHROCYTE [DISTWIDTH] IN BLOOD BY AUTOMATED COUNT: 11.8 % (ref 12.3–15.4)
FOLATE SERPL-MCNC: >20 NG/ML (ref 4.78–24.2)
GLOBULIN SER CALC-MCNC: 2.1 GM/DL
GLUCOSE SERPL-MCNC: 89 MG/DL (ref 65–99)
HBA1C MFR BLD: 5.6 % (ref 4.8–5.6)
HCT VFR BLD AUTO: 39.2 % (ref 34–46.6)
HGB BLD-MCNC: 13.5 G/DL (ref 12–15.9)
IMM GRANULOCYTES # BLD AUTO: 0.01 10*3/MM3 (ref 0–0.05)
IMM GRANULOCYTES NFR BLD AUTO: 0.1 % (ref 0–0.5)
LYMPHOCYTES # BLD AUTO: 2.34 10*3/MM3 (ref 0.7–3.1)
LYMPHOCYTES NFR BLD AUTO: 34 % (ref 19.6–45.3)
MCH RBC QN AUTO: 33.3 PG (ref 26.6–33)
MCHC RBC AUTO-ENTMCNC: 34.4 G/DL (ref 31.5–35.7)
MCV RBC AUTO: 96.6 FL (ref 79–97)
MONOCYTES # BLD AUTO: 0.62 10*3/MM3 (ref 0.1–0.9)
MONOCYTES NFR BLD AUTO: 9 % (ref 5–12)
NEUTROPHILS # BLD AUTO: 3.57 10*3/MM3 (ref 1.7–7)
NEUTROPHILS NFR BLD AUTO: 51.8 % (ref 42.7–76)
NRBC BLD AUTO-RTO: 0 /100 WBC (ref 0–0.2)
PLATELET # BLD AUTO: 249 10*3/MM3 (ref 140–450)
POTASSIUM SERPL-SCNC: 4.6 MMOL/L (ref 3.5–5.2)
PROT SERPL-MCNC: 6.6 G/DL (ref 6–8.5)
RBC # BLD AUTO: 4.06 10*6/MM3 (ref 3.77–5.28)
SODIUM SERPL-SCNC: 143 MMOL/L (ref 136–145)
VIT B12 SERPL-MCNC: 489 PG/ML (ref 211–946)
WBC # BLD AUTO: 6.89 10*3/MM3 (ref 3.4–10.8)

## 2022-12-01 RX ORDER — VALSARTAN 160 MG/1
TABLET ORAL
Qty: 90 TABLET | Refills: 0 | Status: SHIPPED | OUTPATIENT
Start: 2022-12-01 | End: 2023-02-28 | Stop reason: SDUPTHER

## 2022-12-01 RX ORDER — HYDROCHLOROTHIAZIDE 25 MG/1
TABLET ORAL
Qty: 90 TABLET | Refills: 0 | Status: SHIPPED | OUTPATIENT
Start: 2022-12-01 | End: 2023-02-28 | Stop reason: SDUPTHER

## 2022-12-14 RX ORDER — ATORVASTATIN CALCIUM 10 MG/1
10 TABLET, FILM COATED ORAL DAILY
Qty: 90 TABLET | Refills: 0 | Status: SHIPPED | OUTPATIENT
Start: 2022-12-14 | End: 2023-02-28 | Stop reason: SDUPTHER

## 2022-12-14 RX ORDER — METOPROLOL SUCCINATE 50 MG/1
TABLET, EXTENDED RELEASE ORAL
Qty: 90 TABLET | Refills: 0 | Status: SHIPPED | OUTPATIENT
Start: 2022-12-14 | End: 2023-02-28 | Stop reason: SDUPTHER

## 2023-02-06 ENCOUNTER — OFFICE VISIT (OUTPATIENT)
Dept: FAMILY MEDICINE CLINIC | Facility: CLINIC | Age: 73
End: 2023-02-06
Payer: MEDICARE

## 2023-02-06 VITALS
WEIGHT: 176 LBS | HEIGHT: 66 IN | TEMPERATURE: 97.9 F | DIASTOLIC BLOOD PRESSURE: 71 MMHG | OXYGEN SATURATION: 96 % | RESPIRATION RATE: 16 BRPM | SYSTOLIC BLOOD PRESSURE: 129 MMHG | HEART RATE: 84 BPM | BODY MASS INDEX: 28.28 KG/M2

## 2023-02-06 DIAGNOSIS — M17.12 ARTHRITIS OF LEFT KNEE: ICD-10-CM

## 2023-02-06 DIAGNOSIS — I10 PRIMARY HYPERTENSION: ICD-10-CM

## 2023-02-06 DIAGNOSIS — Z01.818 PRE-OPERATIVE CLEARANCE: Primary | ICD-10-CM

## 2023-02-06 DIAGNOSIS — E78.2 MIXED HYPERLIPIDEMIA: ICD-10-CM

## 2023-02-06 PROCEDURE — 99214 OFFICE O/P EST MOD 30 MIN: CPT | Performed by: NURSE PRACTITIONER

## 2023-02-06 RX ORDER — APIXABAN 2.5 MG/1
TABLET, FILM COATED ORAL
COMMUNITY
Start: 2023-02-02

## 2023-02-06 NOTE — PROGRESS NOTES
"Chief Complaint  Pre-op Exam    Subjective          Onelia presents to [facility] for    As a 72 year old female for pre op clearance.  She is scheduled for a Left total knee replacement on 2/16/2023.  She has completed all of her pre op testing and had a normal EKG.  HA1c 5.6  10/2022  B/p controlled  No ha, blurred vision, dizziness, flushing, no chest pain or pressure  Reports reeling well and denies any compliant other than knee pain that is chronic and continued.   UTD on Vaccinations    She has no acute or new C/o  Compliant with all medications.  They reviewed POC and directions for Eliquis prior to surgery.  Had Right knee replaced last year with no complications and doing well post op.    The following portions of the patient's history were reviewed and updated as appropriate: allergies, current medications, past family history, past medical history, past social history, past surgical history, and problem list      Review of Systems   Constitutional: Negative for chills, fatigue and fever.   Eyes: Negative for visual disturbance.   Respiratory: Negative for cough, shortness of breath and wheezing.    Cardiovascular: Negative for chest pain, palpitations and leg swelling.   Gastrointestinal: Negative for abdominal pain, diarrhea, nausea and vomiting.   Musculoskeletal: Positive for arthralgias.   Neurological: Negative for dizziness and light-headedness.        Objective   Vital Signs:   Vitals:    02/06/23 1013   BP: 129/71   Pulse: 84   Resp: 16   Temp: 97.9 °F (36.6 °C)   TempSrc: Skin   SpO2: 96%   Weight: 79.8 kg (176 lb)   Height: 167.6 cm (65.98\")        BMI is >= 25 and <30. (Overweight) The following options were offered after discussion;: weight loss educational material (shared in after visit summary)        Physical Exam  Vitals reviewed.   Constitutional:       General: She is not in acute distress.  Eyes:      Conjunctiva/sclera: Conjunctivae normal.   Neck:      " Thyroid: No thyromegaly.      Vascular: No carotid bruit.   Cardiovascular:      Rate and Rhythm: Normal rate and regular rhythm.      Heart sounds: Normal heart sounds.   Pulmonary:      Effort: Pulmonary effort is normal. No respiratory distress.      Breath sounds: Normal breath sounds. No stridor. No wheezing, rhonchi or rales.   Chest:      Chest wall: No tenderness.   Abdominal:      General: Abdomen is flat. There is no distension.      Palpations: Abdomen is soft. There is no mass.      Tenderness: There is no abdominal tenderness. There is no right CVA tenderness, left CVA tenderness, guarding or rebound.      Hernia: No hernia is present.   Lymphadenopathy:      Cervical: No cervical adenopathy.   Neurological:      Mental Status: She is alert and oriented to person, place, and time.   Psychiatric:         Attention and Perception: Attention normal.         Mood and Affect: Mood normal.          Result Review :     The following data was reviewed by: EMI Bradshaw on 02/06/2023:      SEDIMENTATION RATE (01/31/2023 10:18)- 21  CBC AND DIFFERENTIAL (01/31/2023 10:18) - rbc 3.85  Wbc normal, plt normal  BASIC METABOLIC PANEL (01/31/2023 10:18)- gfr 60    [facility]  Unable to see report     Assessment and Plan    Diagnoses and all orders for this visit:    1. Pre-operative clearance (Primary)  Comments:  Left total knee- 02/16/2023  cleared per this office standpoint    2. Arthritis of left knee  Comments:  plans total knee replacement 2-16/2023  keep all follow up with ortho as directed    3. Primary hypertension  Comments:  controlled-  continue current management    4. Mixed hyperlipidemia  Comments:  controlled continue current managment  last lipid ldl 70 3/2022        Follow Up   Return if symptoms worsen or fail to improve, for keep regular follow up with PCP.  Patient was given instructions and counseling regarding her condition or for health maintenance advice. Please see  specific information pulled into the AVS if appropriate.

## 2023-02-12 DIAGNOSIS — E03.9 PRIMARY HYPOTHYROIDISM: ICD-10-CM

## 2023-02-20 RX ORDER — LEVOTHYROXINE SODIUM 100 MCG
TABLET ORAL
Qty: 20 TABLET | Refills: 0 | Status: SHIPPED | OUTPATIENT
Start: 2023-02-20 | End: 2023-03-16

## 2023-02-22 RX ORDER — LEVOTHYROXINE SODIUM 88 MCG
TABLET ORAL
Qty: 24 TABLET | Refills: 1 | OUTPATIENT
Start: 2023-02-22

## 2023-02-28 ENCOUNTER — OFFICE VISIT (OUTPATIENT)
Dept: FAMILY MEDICINE CLINIC | Facility: CLINIC | Age: 73
End: 2023-02-28
Payer: MEDICARE

## 2023-02-28 VITALS
HEART RATE: 80 BPM | HEIGHT: 66 IN | BODY MASS INDEX: 29.09 KG/M2 | RESPIRATION RATE: 18 BRPM | WEIGHT: 181 LBS | OXYGEN SATURATION: 95 % | DIASTOLIC BLOOD PRESSURE: 68 MMHG | SYSTOLIC BLOOD PRESSURE: 122 MMHG | TEMPERATURE: 97.1 F

## 2023-02-28 DIAGNOSIS — E55.9 VITAMIN D DEFICIENCY: Primary | ICD-10-CM

## 2023-02-28 DIAGNOSIS — E03.9 ACQUIRED HYPOTHYROIDISM: ICD-10-CM

## 2023-02-28 DIAGNOSIS — R73.01 IMPAIRED FASTING GLUCOSE: ICD-10-CM

## 2023-02-28 DIAGNOSIS — Z78.0 POST-MENOPAUSAL: ICD-10-CM

## 2023-02-28 DIAGNOSIS — I10 PRIMARY HYPERTENSION: ICD-10-CM

## 2023-02-28 DIAGNOSIS — R00.2 PALPITATIONS: ICD-10-CM

## 2023-02-28 DIAGNOSIS — E78.2 MIXED HYPERLIPIDEMIA: ICD-10-CM

## 2023-02-28 DIAGNOSIS — Z12.31 ENCOUNTER FOR SCREENING MAMMOGRAM FOR BREAST CANCER: ICD-10-CM

## 2023-02-28 PROBLEM — M17.12 ARTHRITIS OF KNEE, LEFT: Status: ACTIVE | Noted: 2021-12-08

## 2023-02-28 PROBLEM — M17.12 PRIMARY OSTEOARTHRITIS OF LEFT KNEE: Status: ACTIVE | Noted: 2022-01-20

## 2023-02-28 PROCEDURE — 99214 OFFICE O/P EST MOD 30 MIN: CPT | Performed by: PHYSICIAN ASSISTANT

## 2023-02-28 RX ORDER — PROMETHAZINE HYDROCHLORIDE 25 MG/1
TABLET ORAL
COMMUNITY
Start: 2023-02-09

## 2023-02-28 RX ORDER — VALSARTAN 160 MG/1
TABLET ORAL
Qty: 90 TABLET | Refills: 1 | Status: SHIPPED | OUTPATIENT
Start: 2023-02-28

## 2023-02-28 RX ORDER — HYDROCODONE BITARTRATE AND ACETAMINOPHEN 10; 325 MG/1; MG/1
TABLET ORAL
COMMUNITY
Start: 2023-02-13

## 2023-02-28 RX ORDER — METOPROLOL SUCCINATE 50 MG/1
50 TABLET, EXTENDED RELEASE ORAL DAILY
Qty: 90 TABLET | Refills: 1 | Status: SHIPPED | OUTPATIENT
Start: 2023-02-28

## 2023-02-28 RX ORDER — HYDROCHLOROTHIAZIDE 25 MG/1
25 TABLET ORAL DAILY
Qty: 90 TABLET | Refills: 1 | Status: SHIPPED | OUTPATIENT
Start: 2023-02-28

## 2023-02-28 RX ORDER — ATORVASTATIN CALCIUM 10 MG/1
10 TABLET, FILM COATED ORAL DAILY
Qty: 90 TABLET | Refills: 3 | Status: SHIPPED | OUTPATIENT
Start: 2023-02-28

## 2023-02-28 NOTE — PROGRESS NOTES
"Subjective   Onelia Alegre is a 72 y.o. female.     History of Present Illness    Since the last visit, she has overall felt fairly well.  She has Primary Hypertension and well controlled on current medication, Impaired fasting glucose and will monitor labs to watch for DMII, Hyperlipidemia with goals met with current Rx, Hypothyroidism and remains under the care of their specialist and Vitamin D deficiency and labs are at goal >30 ng/mL.  she has been compliant with current medications have reviewed them.  The patient denies medication side effects.  Will refill medications. /68   Pulse 80   Temp 97.1 °F (36.2 °C)   Resp 18   Ht 167.6 cm (65.98\")   Wt 82.1 kg (181 lb)   LMP  (LMP Unknown)   SpO2 95%   BMI 29.23 kg/m²   Endo has lipids ordered.   Results for orders placed or performed in visit on 08/29/22   Comprehensive Metabolic Panel    Specimen: Blood   Result Value Ref Range    Glucose 89 65 - 99 mg/dL    BUN 19 8 - 23 mg/dL    Creatinine 0.98 0.57 - 1.00 mg/dL    EGFR Result 61.1 >60.0 mL/min/1.73    BUN/Creatinine Ratio 19.4 7.0 - 25.0    Sodium 143 136 - 145 mmol/L    Potassium 4.6 3.5 - 5.2 mmol/L    Chloride 103 98 - 107 mmol/L    Total CO2 31.0 (H) 22.0 - 29.0 mmol/L    Calcium 10.4 8.6 - 10.5 mg/dL    Total Protein 6.6 6.0 - 8.5 g/dL    Albumin 4.50 3.50 - 5.20 g/dL    Globulin 2.1 gm/dL    A/G Ratio 2.1 g/dL    Total Bilirubin 1.0 0.0 - 1.2 mg/dL    Alkaline Phosphatase 92 39 - 117 U/L    AST (SGOT) 19 1 - 32 U/L    ALT (SGPT) 11 1 - 33 U/L   Hemoglobin A1c    Specimen: Blood   Result Value Ref Range    Hemoglobin A1C 5.60 4.80 - 5.60 %   Vitamin B12    Specimen: Blood   Result Value Ref Range    Vitamin B-12 489 211 - 946 pg/mL   Folate    Specimen: Blood   Result Value Ref Range    Folate >20.00 4.78 - 24.20 ng/mL   Vitamin D 25 Hydroxy    Specimen: Blood   Result Value Ref Range    25 Hydroxy, Vitamin D 96.9 30.0 - 100.0 ng/ml   CBC & Differential    Specimen: Blood   Result Value " Ref Range    WBC 6.89 3.40 - 10.80 10*3/mm3    RBC 4.06 3.77 - 5.28 10*6/mm3    Hemoglobin 13.5 12.0 - 15.9 g/dL    Hematocrit 39.2 34.0 - 46.6 %    MCV 96.6 79.0 - 97.0 fL    MCH 33.3 (H) 26.6 - 33.0 pg    MCHC 34.4 31.5 - 35.7 g/dL    RDW 11.8 (L) 12.3 - 15.4 %    Platelets 249 140 - 450 10*3/mm3    Neutrophil Rel % 51.8 42.7 - 76.0 %    Lymphocyte Rel % 34.0 19.6 - 45.3 %    Monocyte Rel % 9.0 5.0 - 12.0 %    Eosinophil Rel % 4.4 0.3 - 6.2 %    Basophil Rel % 0.7 0.0 - 1.5 %    Neutrophils Absolute 3.57 1.70 - 7.00 10*3/mm3    Lymphocytes Absolute 2.34 0.70 - 3.10 10*3/mm3    Monocytes Absolute 0.62 0.10 - 0.90 10*3/mm3    Eosinophils Absolute 0.30 0.00 - 0.40 10*3/mm3    Basophils Absolute 0.05 0.00 - 0.20 10*3/mm3    Immature Granulocyte Rel % 0.1 0.0 - 0.5 %    Immature Grans Absolute 0.01 0.00 - 0.05 10*3/mm3    nRBC 0.0 0.0 - 0.2 /100 WBC     8.9% 1.0% FRAX score with Osteopenia 3-30-21   Due mammo and DEXA  ECG 12 Lead   Date/Time: 1/27/2021 4:11 PM  Performed by: Brendon Mendoza MD  Authorized by: Brendon Mendoza MD   Comparison: compared with previous ECG from 1/12/2021  Similar to previous ECG  Rhythm: sinus tachycardia  Conduction: left anterior fascicular block  Other findings: poor R wave progression  Had neg stress treadmill, echo, Holter 2-2021    DR Mendoza has f/u PRN     Just had left knee replaced Dr Stanford 2-16-23  Sees endocrine for thyroid    Had EKG 1-31-22 same with bradycardia ---heart rate 58    A1C 2-17-23  5.4%    The following portions of the patient's history were reviewed and updated as appropriate: allergies, current medications, past family history, past medical history, past social history, past surgical history and problem list.    Review of Systems   Constitutional: Negative for activity change, appetite change and unexpected weight change.   HENT: Negative for nosebleeds and trouble swallowing.    Eyes: Negative for pain and visual disturbance.   Respiratory: Negative  for chest tightness, shortness of breath and wheezing.    Cardiovascular: Negative for chest pain and palpitations.   Gastrointestinal: Negative for abdominal pain and blood in stool.   Endocrine: Negative.    Genitourinary: Negative for difficulty urinating and hematuria.   Musculoskeletal: Positive for arthralgias and joint swelling.   Skin: Negative for color change and rash.   Allergic/Immunologic: Negative.    Neurological: Negative for syncope and speech difficulty.   Hematological: Negative for adenopathy.   Psychiatric/Behavioral: Negative for agitation and confusion.   All other systems reviewed and are negative.      Objective   Physical Exam  Vitals and nursing note reviewed.   Constitutional:       General: She is not in acute distress.     Appearance: She is well-developed. She is not ill-appearing or toxic-appearing.   HENT:      Head: Normocephalic.      Right Ear: External ear normal.      Left Ear: External ear normal.      Nose: Nose normal.      Mouth/Throat:      Pharynx: Oropharynx is clear.   Eyes:      General: No scleral icterus.     Conjunctiva/sclera: Conjunctivae normal.      Pupils: Pupils are equal, round, and reactive to light.   Neck:      Thyroid: No thyromegaly.      Vascular: No carotid bruit.   Cardiovascular:      Rate and Rhythm: Normal rate and regular rhythm.      Heart sounds: Normal heart sounds. No murmur heard.  Pulmonary:      Effort: Pulmonary effort is normal. No respiratory distress.      Breath sounds: Normal breath sounds.   Musculoskeletal:         General: No deformity. Normal range of motion.      Cervical back: Normal range of motion and neck supple.      Right lower leg: No edema.      Left lower leg: Edema present.   Skin:     General: Skin is warm and dry.      Findings: No rash.   Neurological:      General: No focal deficit present.      Mental Status: She is alert and oriented to person, place, and time. Mental status is at baseline.   Psychiatric:          Mood and Affect: Mood normal.         Behavior: Behavior normal.         Thought Content: Thought content normal.         Judgment: Judgment normal.         Assessment & Plan   Diagnoses and all orders for this visit:    1. Vitamin D deficiency (Primary)    2. Mixed hyperlipidemia    3. Acquired hypothyroidism    4. Impaired fasting glucose     5. Palpitations  Comments:  controlled on Toprol    6. Primary hypertension    7. Post-menopausal  -     DEXA Bone Density Axial    8. Encounter for screening mammogram for breast cancer  -     Mammo Screening Digital Tomosynthesis Bilateral With CAD    Other orders  -     atorvastatin (LIPITOR) 10 MG tablet; Take 1 tablet by mouth Daily. For cholesterol  Dispense: 90 tablet; Refill: 3  -     hydroCHLOROthiazide (HYDRODIURIL) 25 MG tablet; Take 1 tablet by mouth Daily. For BP  Dispense: 90 tablet; Refill: 1  -     valsartan (DIOVAN) 160 MG tablet; TAKE 1 TABLET EVERY DAY FOR BLOOD PRESSURE  Dispense: 90 tablet; Refill: 1  -     metoprolol succinate XL (TOPROL-XL) 50 MG 24 hr tablet; Take 1 tablet by mouth Daily. For blood pressure and palpitations  Dispense: 90 tablet; Refill: 1    Plan, Onelia Alegre, was seen today.  she was seen for HTN and continue medication, Imparied fasting glucose and plan follow up labs, diet, and exercise, Hyperlipidemia and will continue current medication, Hypothyroidism and under the care of specialist and Vitamin D deficiency and supplemented.  Due for colon cancer screening at the end of April noting family history with her dad--- LGA  Endocrine is ordering lipid panel  Just had knee replacement reviewed labs noting she had an A1c at 5.4% on 2/17/2023  Has follow-up with Ortho for left knee replacement  Continue Toprol for palpitations working well  Bone density mammogram due the end of August noting osteopenia history

## 2023-03-09 ENCOUNTER — LAB (OUTPATIENT)
Dept: ENDOCRINOLOGY | Age: 73
End: 2023-03-09
Payer: MEDICARE

## 2023-03-09 DIAGNOSIS — E78.5 DYSLIPIDEMIA: ICD-10-CM

## 2023-03-09 DIAGNOSIS — E03.9 PRIMARY HYPOTHYROIDISM: ICD-10-CM

## 2023-03-10 LAB
ALBUMIN SERPL-MCNC: 4.3 G/DL (ref 3.5–5.2)
ALBUMIN/GLOB SERPL: 1.9 G/DL
ALP SERPL-CCNC: 90 U/L (ref 39–117)
ALT SERPL-CCNC: 9 U/L (ref 1–33)
AST SERPL-CCNC: 19 U/L (ref 1–32)
BILIRUB SERPL-MCNC: 1 MG/DL (ref 0–1.2)
BUN SERPL-MCNC: 21 MG/DL (ref 8–23)
BUN/CREAT SERPL: 20.6 (ref 7–25)
CALCIUM SERPL-MCNC: 10.3 MG/DL (ref 8.6–10.5)
CHLORIDE SERPL-SCNC: 101 MMOL/L (ref 98–107)
CHOLEST SERPL-MCNC: 143 MG/DL (ref 0–200)
CO2 SERPL-SCNC: 29.3 MMOL/L (ref 22–29)
CREAT SERPL-MCNC: 1.02 MG/DL (ref 0.57–1)
EGFRCR SERPLBLD CKD-EPI 2021: 57.8 ML/MIN/1.73
GLOBULIN SER CALC-MCNC: 2.3 GM/DL
GLUCOSE SERPL-MCNC: 95 MG/DL (ref 65–99)
HDLC SERPL-MCNC: 55 MG/DL (ref 40–60)
IMP & REVIEW OF LAB RESULTS: NORMAL
LDLC SERPL CALC-MCNC: 65 MG/DL (ref 0–100)
POTASSIUM SERPL-SCNC: 4.8 MMOL/L (ref 3.5–5.2)
PROT SERPL-MCNC: 6.6 G/DL (ref 6–8.5)
SODIUM SERPL-SCNC: 141 MMOL/L (ref 136–145)
T3FREE SERPL-MCNC: 3.4 PG/ML (ref 2–4.4)
T4 FREE SERPL-MCNC: 1.86 NG/DL (ref 0.93–1.7)
TRIGL SERPL-MCNC: 135 MG/DL (ref 0–150)
TSH SERPL DL<=0.005 MIU/L-ACNC: 2.17 UIU/ML (ref 0.27–4.2)
VLDLC SERPL CALC-MCNC: 23 MG/DL (ref 5–40)

## 2023-03-16 ENCOUNTER — OFFICE VISIT (OUTPATIENT)
Dept: ENDOCRINOLOGY | Age: 73
End: 2023-03-16
Payer: MEDICARE

## 2023-03-16 VITALS
SYSTOLIC BLOOD PRESSURE: 122 MMHG | BODY MASS INDEX: 29.32 KG/M2 | OXYGEN SATURATION: 97 % | DIASTOLIC BLOOD PRESSURE: 74 MMHG | TEMPERATURE: 96.6 F | WEIGHT: 182.4 LBS | HEART RATE: 83 BPM | HEIGHT: 66 IN

## 2023-03-16 DIAGNOSIS — E03.9 PRIMARY HYPOTHYROIDISM: ICD-10-CM

## 2023-03-16 PROCEDURE — 99213 OFFICE O/P EST LOW 20 MIN: CPT | Performed by: NURSE PRACTITIONER

## 2023-03-16 PROCEDURE — 3074F SYST BP LT 130 MM HG: CPT | Performed by: NURSE PRACTITIONER

## 2023-03-16 PROCEDURE — 3078F DIAST BP <80 MM HG: CPT | Performed by: NURSE PRACTITIONER

## 2023-03-16 RX ORDER — LEVOTHYROXINE SODIUM 88 MCG
TABLET ORAL
Qty: 30 TABLET | Refills: 1 | Status: SHIPPED | OUTPATIENT
Start: 2023-03-16

## 2023-03-16 RX ORDER — LEVOTHYROXINE SODIUM 100 MCG
TABLET ORAL
Qty: 90 TABLET | Refills: 0 | Status: SHIPPED | OUTPATIENT
Start: 2023-03-16

## 2023-03-16 NOTE — PROGRESS NOTES
"Chief Complaint  Hypothyroidism (Pt states that energy levels have been low, weight is higher than expected, does have family hx of thyroid disease (mother and grandmother).)    Subjective        Onelia Alegre presents to [facility]  History of Present Illness  Had knee replacement feb 2023  Next bone density next month     Hypothyroidism  Currently on Synthroid 100 mcg 5 days a week and 88 mcg 2 days a week  Patient denies any symptoms of hypothyroidism or hyperthyroidism today  Dose has been adjusted to improve T4 dosing  Patient reports she is not on any biotin    Objective   Vital Signs:  /74   Pulse 83   Temp 96.6 °F (35.9 °C) (Temporal)   Ht 167.6 cm (65.98\")   Wt 82.7 kg (182 lb 6.4 oz)   SpO2 97%   BMI 29.45 kg/m²   Estimated body mass index is 29.45 kg/m² as calculated from the following:    Height as of this encounter: 167.6 cm (65.98\").    Weight as of this encounter: 82.7 kg (182 lb 6.4 oz).             Physical Exam  Vitals reviewed.   Constitutional:       General: She is not in acute distress.  HENT:      Head: Normocephalic and atraumatic.   Cardiovascular:      Rate and Rhythm: Normal rate.   Pulmonary:      Effort: Pulmonary effort is normal. No respiratory distress.   Musculoskeletal:         General: No signs of injury. Normal range of motion.      Cervical back: Normal range of motion and neck supple.   Skin:     General: Skin is warm and dry.   Neurological:      Mental Status: She is alert and oriented to person, place, and time. Mental status is at baseline.   Psychiatric:         Mood and Affect: Mood normal.         Behavior: Behavior normal.         Thought Content: Thought content normal.         Judgment: Judgment normal.        Result Review :  The following data was reviewed by: EMI Arteaga on 03/16/2023:  Common labs    Common Labs 2/16/23 2/17/23 3/9/23 3/9/23      [id number]   Glucose    95   BUN    21   Creatinine    1.02 (A) "   Sodium    141   Potassium 3.5   4.8   Chloride    101   Calcium    10.3   Total Protein    6.6   Albumin    4.3   Total Bilirubin    1.0   Alkaline Phosphatase    90   AST (SGOT)    19   ALT (SGPT)    9   Total Cholesterol   143    Triglycerides   135    HDL Cholesterol   55    LDL Cholesterol    65    Hemoglobin A1C  5.4     (A) Abnormal value       Comments are available for some flowsheets but are not being displayed.                        Assessment and Plan   Diagnoses and all orders for this visit:    1. Primary hypothyroidism  -     Synthroid 100 MCG tablet; 100mcg 4 days a week and 88mcg 3 days a week  Dispense: 90 tablet; Refill: 0    Other orders  -     Synthroid 88 MCG tablet; 88mcg 3 days a week and 100mcg 4 days a week  Dispense: 30 tablet; Refill: 1             Follow Up   Return in about 1 year (around 3/16/2024).     Change Synthroid to 100 mcg 4 days a week and 88 mcg 3 days a week    Patient was given instructions and counseling regarding her condition or for health maintenance advice. Please see specific information pulled into the AVS if appropriate.     EMI Arteaga

## 2023-04-24 ENCOUNTER — HOSPITAL ENCOUNTER (OUTPATIENT)
Dept: PET IMAGING | Facility: HOSPITAL | Age: 73
Discharge: HOME | End: 2023-04-24
Payer: MEDICARE

## 2023-04-24 ENCOUNTER — APPOINTMENT (OUTPATIENT)
Dept: WOMENS IMAGING | Facility: HOSPITAL | Age: 73
End: 2023-04-24
Payer: MEDICARE

## 2023-04-24 PROCEDURE — 77067 SCR MAMMO BI INCL CAD: CPT | Performed by: RADIOLOGY

## 2023-04-24 PROCEDURE — 77063 BREAST TOMOSYNTHESIS BI: CPT | Performed by: RADIOLOGY

## 2023-04-24 PROCEDURE — 77080 DXA BONE DENSITY AXIAL: CPT

## 2023-04-24 PROCEDURE — 77080 DXA BONE DENSITY AXIAL: CPT | Performed by: RADIOLOGY

## 2023-05-18 ENCOUNTER — OFFICE VISIT (OUTPATIENT)
Dept: FAMILY MEDICINE CLINIC | Facility: CLINIC | Age: 73
End: 2023-05-18
Payer: MEDICARE

## 2023-05-18 VITALS
WEIGHT: 182 LBS | RESPIRATION RATE: 16 BRPM | BODY MASS INDEX: 29.25 KG/M2 | TEMPERATURE: 95.8 F | SYSTOLIC BLOOD PRESSURE: 120 MMHG | HEART RATE: 61 BPM | OXYGEN SATURATION: 98 % | DIASTOLIC BLOOD PRESSURE: 76 MMHG | HEIGHT: 66 IN

## 2023-05-18 DIAGNOSIS — D22.9 ATYPICAL NEVI: ICD-10-CM

## 2023-05-18 DIAGNOSIS — L82.1 SEBORRHEIC KERATOSES: ICD-10-CM

## 2023-05-18 DIAGNOSIS — M85.89 OSTEOPENIA OF MULTIPLE SITES: Primary | ICD-10-CM

## 2023-05-18 PROCEDURE — 1160F RVW MEDS BY RX/DR IN RCRD: CPT | Performed by: PHYSICIAN ASSISTANT

## 2023-05-18 PROCEDURE — 3078F DIAST BP <80 MM HG: CPT | Performed by: PHYSICIAN ASSISTANT

## 2023-05-18 PROCEDURE — 99213 OFFICE O/P EST LOW 20 MIN: CPT | Performed by: PHYSICIAN ASSISTANT

## 2023-05-18 PROCEDURE — 3074F SYST BP LT 130 MM HG: CPT | Performed by: PHYSICIAN ASSISTANT

## 2023-05-18 PROCEDURE — 1159F MED LIST DOCD IN RCRD: CPT | Performed by: PHYSICIAN ASSISTANT

## 2023-05-18 RX ORDER — ALENDRONATE SODIUM 70 MG/1
70 TABLET ORAL
Qty: 13 TABLET | Refills: 3 | Status: SHIPPED | OUTPATIENT
Start: 2023-05-18

## 2023-05-18 NOTE — PROGRESS NOTES
"Subjective   Onelia Alegre is a 72 y.o. female.     History of Present Illness   Onelia Alegre 72 y.o. female /56   Pulse 61   Temp 95.8 °F (35.4 °C)   Resp 16   Ht 167.6 cm (65.98\")   Wt 82.6 kg (182 lb)   LMP  (LMP Unknown)   SpO2 98%   BMI 29.39 kg/m²  who presents today for Osteopenia. Just had DEXA   she has a history of   Patient Active Problem List   Diagnosis   • Benign essential hypertension   • Osteopenia   • Excess weight   • Localized edema   • Thyroid function test abnormal   • Primary hypothyroidism   • Vitamin D deficiency   • Dyslipidemia   • Hyperuricemia   • Symptomatic menopausal or female climacteric states   • Renal insufficiency   • Palpitations   • Neutrophilic leukocytosis   • Easy bruising   • Arthritis of knee, left   • Hyperlipidemia   • Hypertension   • Primary osteoarthritis of left knee   • Hypothyroidism   • Impaired fasting glucose    .    Current 4-24-23    HIP:  *  At the left femoral neck, lowest T score measures -1.4.  *  BMD: 0.697 g/cm2.  *  WHO category: Osteopenia.   *  3.7% decrease since previous     LUMBAR SPINE:  *  At the lumbar spine, L1-4 T score measures -1.2.  *  BMD: 0.915 g/cm2.  *  WHO category: Osteopenia.   *  -8.2%     IMPRESSION:  Osteopenia with T score -1.4 the left femoral neck        FRAX:  10 year fracture risk: Major osteoporotic fracture, 10%; hip fracture,  1.7%.  Fracture probability calculated for an untreated patient.     This report was finalized on 4/24/2023 3:51 PM by Dr. Clifford Contreras MD.       Prior FRAX score was 8.9% and 1% and with normal bone density in 2021 scan  She was on estrogen in the past and tolerated it.  She has no history of VTE and no family history of VTE or clotting disorder.  Never had a stroke.  We did talk about Evista and she is higher risk of dying from a stroke if she has 1 if she is on the Evista and also VTE risk..  We also talked about Fosamax and how she has to wait at least 30 minutes after " taking it to lay down or eat or drink.......  Not having any dental procedures  Need to see derm----irreg nevi on back and abd---refer to derm  The following portions of the patient's history were reviewed and updated as appropriate: allergies, current medications, past family history, past medical history, past social history, past surgical history and problem list.    Review of Systems   Constitutional: Negative for activity change, appetite change and unexpected weight change.   HENT: Negative for nosebleeds and trouble swallowing.    Eyes: Negative for pain and visual disturbance.   Respiratory: Negative for chest tightness, shortness of breath and wheezing.    Cardiovascular: Negative for chest pain and palpitations.   Gastrointestinal: Negative for abdominal pain and blood in stool.   Endocrine: Negative.    Genitourinary: Negative for difficulty urinating and hematuria.   Musculoskeletal: Negative for joint swelling.   Skin: Positive for color change. Negative for rash.   Allergic/Immunologic: Negative.    Neurological: Negative for syncope and speech difficulty.   Hematological: Negative for adenopathy.   Psychiatric/Behavioral: Negative for agitation and confusion.   All other systems reviewed and are negative.      Objective   Physical Exam  Constitutional:       General: She is not in acute distress.     Appearance: She is well-developed. She is not diaphoretic.   HENT:      Head: Normocephalic and atraumatic.   Eyes:      Conjunctiva/sclera: Conjunctivae normal.   Pulmonary:      Effort: Pulmonary effort is normal. No respiratory distress.   Musculoskeletal:         General: Normal range of motion.      Cervical back: Normal range of motion.   Skin:     General: Skin is dry.      Findings: Lesion present.      Comments: Atypical nevi upper back x2 irregular shaped dark pink with the center not healing    Also has seborrheic keratoses on her thighs and abdomen   Neurological:      Mental Status: She is  alert and oriented to person, place, and time.      Coordination: Coordination normal.   Psychiatric:         Behavior: Behavior normal.         Thought Content: Thought content normal.         Judgment: Judgment normal.         Assessment & Plan   Diagnoses and all orders for this visit:    1. Osteopenia of multiple sites (Primary)    2. Atypical nevi  -     Ambulatory Referral to Dermatology    3. Seborrheic keratoses    Other orders  -     alendronate (Fosamax) 70 MG tablet; Take 1 tablet by mouth Every 7 (Seven) Days.  Dispense: 13 tablet; Refill: 3        Need to see derm----irreg nevi on back and abd---refer to derm  Weightbearing exercise continue calcium and vitamin D  DEXA scan every 2 years  Stop Fosamax if side effects and let me know  Plan 5 yr only     Answers for HPI/ROS submitted by the patient on 5/11/2023  Please describe your symptoms.: Follow up for Bone Density results  Have you had these symptoms before?: No  How long have you been having these symptoms?: Greater than 2 weeks  Please list any medications you are currently taking for this condition.: Calcium over the counter & B3  What is the primary reason for your visit?: Other

## 2023-05-23 NOTE — PATIENT INSTRUCTIONS
Estrogen  Progesterone  Allopurinol    Let me know about rechecking thyroid labs in 6 weeks- where do you want to get them done?       Hyperthyroidism    Hyperthyroidism is when the thyroid gland is too active (overactive). The thyroid gland is a small gland located in the lower front part of the neck, just in front of the windpipe (trachea). This gland makes hormones that help control how the body uses food for energy (metabolism) as well as how the heart and brain function. These hormones also play a role in keeping your bones strong. When the thyroid is overactive, it produces too much of a hormone called thyroxine.  What are the causes?  This condition may be caused by:  · Graves' disease. This is a disorder in which the body's disease-fighting system (immune system) attacks the thyroid gland. This is the most common cause.  · Inflammation of the thyroid gland.  · A tumor in the thyroid gland.  · Use of certain medicines, including:  ? Prescription thyroid hormone replacement.  ? Herbal supplements that mimic thyroid hormones.  ? Amiodarone therapy.  · Solid or fluid-filled lumps within your thyroid gland (thyroid nodules).  · Taking in a large amount of iodine from foods or medicines.  What increases the risk?  You are more likely to develop this condition if:  · You are female.  · You have a family history of thyroid conditions.  · You smoke tobacco.  · You use a medicine called lithium.  · You take medicines that affect the immune system (immunosuppressants).  What are the signs or symptoms?  Symptoms of this condition include:  · Nervousness.  · Inability to tolerate heat.  · Unexplained weight loss.  · Diarrhea.  · Change in the texture of hair or skin.  · Heart skipping beats or making extra beats.  · Rapid heart rate.  · Loss of menstruation.  · Shaky hands.  · Fatigue.  · Restlessness.  · Sleep problems.  · Enlarged thyroid gland or a lump in the thyroid (nodule).  You may also have symptoms of Graves'  The following findings require follow up:  Radiographic finding   Finding: gallbladder sludge   Follow up required: discussion regarding appointment with surgeon in case you have future gallbladder problems   Follow up should be done within 3 month(s)    Please notify the following clinician to assist with the follow up:   Dr Sj Walls MD disease, which may include:  · Protruding eyes.  · Dry eyes.  · Red or swollen eyes.  · Problems with vision.  How is this diagnosed?  This condition may be diagnosed based on:  · Your symptoms and medical history.  · A physical exam.  · Blood tests.  · Thyroid ultrasound. This test involves using sound waves to produce images of the thyroid gland.  · A thyroid scan. A radioactive substance is injected into a vein, and images show how much iodine is present in the thyroid.  · Radioactive iodine uptake test (RAIU). A small amount of radioactive iodine is given by mouth to see how much iodine the thyroid absorbs after a certain amount of time.  How is this treated?  Treatment depends on the cause and severity of the condition. Treatment may include:  · Medicines to reduce the amount of thyroid hormone your body makes.  · Radioactive iodine treatment (radioiodine therapy). This involves swallowing a small dose of radioactive iodine, in capsule or liquid form, to kill thyroid cells.  · Surgery to remove part or all of your thyroid gland. You may need to take thyroid hormone replacement medicine for the rest of your life after thyroid surgery.  · Medicines to help manage your symptoms.  Follow these instructions at home:    · Take over-the-counter and prescription medicines only as told by your health care provider.  · Do not use any products that contain nicotine or tobacco, such as cigarettes and e-cigarettes. If you need help quitting, ask your health care provider.  · Follow any instructions from your health care provider about diet. You may be instructed to limit foods that contain iodine.  · Keep all follow-up visits as told by your health care provider. This is important.  ? You will need to have blood tests regularly so that your health care provider can monitor your condition.  Contact a health care provider if:  · Your symptoms do not get better with treatment.  · You have a fever.  · You are taking thyroid  hormone replacement medicine and you:  ? Have symptoms of depression.  ? Feel like you are tired all the time.  ? Gain weight.  Get help right away if:  · You have chest pain.  · You have decreased alertness or a change in your awareness.  · You have abdominal pain.  · You feel dizzy.  · You have a rapid heartbeat.  · You have an irregular heartbeat.  · You have difficulty breathing.  Summary  · The thyroid gland is a small gland located in the lower front part of the neck, just in front of the windpipe (trachea).  · Hyperthyroidism is when the thyroid gland is too active (overactive) and produces too much of a hormone called thyroxine.  · The most common cause is Graves' disease, a disorder in which your immune system attacks the thyroid gland.  · Hyperthyroidism can cause various symptoms, such as unexplained weight loss, nervousness, inability to tolerate heat, or changes in your heartbeat.  · Treatment may include medicine to reduce the amount of thyroid hormone your body makes, radioiodine therapy, surgery, or medicines to manage symptoms.  This information is not intended to replace advice given to you by your health care provider. Make sure you discuss any questions you have with your health care provider.  Document Revised: 11/30/2018 Document Reviewed: 11/28/2018  [employer] Patient Education © 2020 [employer]

## 2023-06-05 VITALS
RESPIRATION RATE: 13 BRPM | DIASTOLIC BLOOD PRESSURE: 55 MMHG | RESPIRATION RATE: 20 BRPM | RESPIRATION RATE: 16 BRPM | TEMPERATURE: 96 F | HEART RATE: 72 BPM | TEMPERATURE: 97.3 F | HEART RATE: 68 BPM | SYSTOLIC BLOOD PRESSURE: 154 MMHG | SYSTOLIC BLOOD PRESSURE: 92 MMHG | OXYGEN SATURATION: 93 % | SYSTOLIC BLOOD PRESSURE: 135 MMHG | SYSTOLIC BLOOD PRESSURE: 106 MMHG | DIASTOLIC BLOOD PRESSURE: 56 MMHG | HEART RATE: 78 BPM | HEIGHT: 65 IN | HEART RATE: 65 BPM | DIASTOLIC BLOOD PRESSURE: 89 MMHG | OXYGEN SATURATION: 98 % | SYSTOLIC BLOOD PRESSURE: 134 MMHG | RESPIRATION RATE: 8 BRPM | DIASTOLIC BLOOD PRESSURE: 57 MMHG | DIASTOLIC BLOOD PRESSURE: 81 MMHG | RESPIRATION RATE: 22 BRPM | SYSTOLIC BLOOD PRESSURE: 101 MMHG | OXYGEN SATURATION: 100 % | SYSTOLIC BLOOD PRESSURE: 100 MMHG | HEART RATE: 64 BPM | DIASTOLIC BLOOD PRESSURE: 53 MMHG | OXYGEN SATURATION: 94 % | RESPIRATION RATE: 14 BRPM | DIASTOLIC BLOOD PRESSURE: 59 MMHG | HEART RATE: 66 BPM | HEART RATE: 83 BPM | HEART RATE: 70 BPM | WEIGHT: 170 LBS | TEMPERATURE: 96.7 F | RESPIRATION RATE: 19 BRPM | DIASTOLIC BLOOD PRESSURE: 49 MMHG | SYSTOLIC BLOOD PRESSURE: 143 MMHG | RESPIRATION RATE: 17 BRPM | SYSTOLIC BLOOD PRESSURE: 105 MMHG | HEART RATE: 67 BPM | RESPIRATION RATE: 15 BRPM | SYSTOLIC BLOOD PRESSURE: 102 MMHG | OXYGEN SATURATION: 95 % | DIASTOLIC BLOOD PRESSURE: 68 MMHG | OXYGEN SATURATION: 97 %

## 2023-06-08 ENCOUNTER — OFFICE (AMBULATORY)
Dept: URBAN - METROPOLITAN AREA PATHOLOGY 4 | Facility: PATHOLOGY | Age: 73
End: 2023-06-08
Payer: MEDICARE

## 2023-06-08 ENCOUNTER — AMBULATORY SURGICAL CENTER (AMBULATORY)
Dept: URBAN - METROPOLITAN AREA SURGERY 17 | Facility: SURGERY | Age: 73
End: 2023-06-08
Payer: MEDICARE

## 2023-06-08 DIAGNOSIS — Z80.0 FAMILY HISTORY OF MALIGNANT NEOPLASM OF DIGESTIVE ORGANS: ICD-10-CM

## 2023-06-08 DIAGNOSIS — D12.3 BENIGN NEOPLASM OF TRANSVERSE COLON: ICD-10-CM

## 2023-06-08 DIAGNOSIS — D12.2 BENIGN NEOPLASM OF ASCENDING COLON: ICD-10-CM

## 2023-06-08 DIAGNOSIS — D12.5 BENIGN NEOPLASM OF SIGMOID COLON: ICD-10-CM

## 2023-06-08 DIAGNOSIS — K57.30 DIVERTICULOSIS OF LARGE INTESTINE WITHOUT PERFORATION OR ABS: ICD-10-CM

## 2023-06-08 DIAGNOSIS — Z86.010 PERSONAL HISTORY OF COLONIC POLYPS: ICD-10-CM

## 2023-06-08 PROBLEM — K63.5 POLYP OF COLON: Status: ACTIVE | Noted: 2023-06-08

## 2023-06-08 LAB
GI HISTOLOGY: A. UNSPECIFIED: (no result)
GI HISTOLOGY: B. UNSPECIFIED: (no result)
GI HISTOLOGY: C. UNSPECIFIED: (no result)
GI HISTOLOGY: PDF REPORT: (no result)

## 2023-06-08 PROCEDURE — 88305 TISSUE EXAM BY PATHOLOGIST: CPT | Performed by: INTERNAL MEDICINE

## 2023-06-08 PROCEDURE — 45380 COLONOSCOPY AND BIOPSY: CPT | Mod: 59 | Performed by: INTERNAL MEDICINE

## 2023-06-08 PROCEDURE — 45385 COLONOSCOPY W/LESION REMOVAL: CPT | Performed by: INTERNAL MEDICINE

## 2023-06-08 NOTE — SERVICEHPINOTES
YULIANA HAMMONDS  is a  72  female   who presents today for a  Colonoscopy   for   the indications listed below. The updated Patient Profile was reviewed prior to the procedure, in conjunction with the Physical Exam, including medical conditions, surgical procedures, medications, allergies, family history and social history. See Physical Exam time stamp below for date and time of HPI completion.Pre-operatively, I reviewed the indication(s) for the procedure, the risks of the procedure [including but not limited to: unexpected bleeding possibly requiring hospitalization and/or unplanned repeat procedures, perforation possibly requiring surgical treatment, missed lesions and complications of sedation/MAC (also explained by anesthesia staff)]. I have evaluated the patient for risks associated with the planned anesthesia and the procedure to be performed and find the patient an acceptable candidate for IV sedation.Multiple opportunities were provided for any questions or concerns, and all questions were answered satisfactorily before any anesthesia was administered. We will proceed with the planned procedure.br

## 2023-07-26 DIAGNOSIS — E03.9 PRIMARY HYPOTHYROIDISM: ICD-10-CM

## 2023-07-26 RX ORDER — LEVOTHYROXINE SODIUM 100 MCG
TABLET ORAL
Qty: 52 TABLET | Refills: 0 | Status: SHIPPED | OUTPATIENT
Start: 2023-07-26

## 2023-08-28 ENCOUNTER — OFFICE VISIT (OUTPATIENT)
Dept: FAMILY MEDICINE CLINIC | Facility: CLINIC | Age: 73
End: 2023-08-28
Payer: MEDICARE

## 2023-08-28 VITALS
HEART RATE: 76 BPM | SYSTOLIC BLOOD PRESSURE: 120 MMHG | TEMPERATURE: 95.8 F | BODY MASS INDEX: 27.97 KG/M2 | DIASTOLIC BLOOD PRESSURE: 76 MMHG | WEIGHT: 174 LBS | OXYGEN SATURATION: 99 % | RESPIRATION RATE: 16 BRPM | HEIGHT: 66 IN

## 2023-08-28 DIAGNOSIS — E78.2 MIXED HYPERLIPIDEMIA: ICD-10-CM

## 2023-08-28 DIAGNOSIS — E55.9 VITAMIN D DEFICIENCY: Primary | ICD-10-CM

## 2023-08-28 DIAGNOSIS — I10 BENIGN ESSENTIAL HYPERTENSION: ICD-10-CM

## 2023-08-28 DIAGNOSIS — M85.89 OSTEOPENIA OF MULTIPLE SITES: ICD-10-CM

## 2023-08-28 DIAGNOSIS — R73.01 IMPAIRED FASTING GLUCOSE: ICD-10-CM

## 2023-08-28 DIAGNOSIS — E53.8 LOW SERUM VITAMIN B12: ICD-10-CM

## 2023-08-28 PROCEDURE — 1160F RVW MEDS BY RX/DR IN RCRD: CPT | Performed by: PHYSICIAN ASSISTANT

## 2023-08-28 PROCEDURE — 1159F MED LIST DOCD IN RCRD: CPT | Performed by: PHYSICIAN ASSISTANT

## 2023-08-28 PROCEDURE — 3078F DIAST BP <80 MM HG: CPT | Performed by: PHYSICIAN ASSISTANT

## 2023-08-28 PROCEDURE — 99214 OFFICE O/P EST MOD 30 MIN: CPT | Performed by: PHYSICIAN ASSISTANT

## 2023-08-28 PROCEDURE — 3074F SYST BP LT 130 MM HG: CPT | Performed by: PHYSICIAN ASSISTANT

## 2023-08-28 RX ORDER — METOPROLOL SUCCINATE 50 MG/1
50 TABLET, EXTENDED RELEASE ORAL DAILY
Qty: 90 TABLET | Refills: 1 | Status: SHIPPED | OUTPATIENT
Start: 2023-08-28

## 2023-08-28 RX ORDER — VALSARTAN 160 MG/1
TABLET ORAL
Qty: 90 TABLET | Refills: 1 | Status: SHIPPED | OUTPATIENT
Start: 2023-08-28

## 2023-08-28 RX ORDER — HYDROCHLOROTHIAZIDE 25 MG/1
25 TABLET ORAL DAILY
Qty: 90 TABLET | Refills: 1 | Status: SHIPPED | OUTPATIENT
Start: 2023-08-28

## 2023-08-28 NOTE — PROGRESS NOTES
"Subjective   Onelia Alegre is a 72 y.o. female.     Hypertension  Pertinent negatives include no blurred vision.   Hyperlipidemia  Exacerbating diseases include hypothyroidism.   Hypothyroidism  Pertinent negatives include no diaphoresis.     Since the last visit, she has overall felt well.  She has Primary Hypertension and well controlled on current medication, Impaired fasting glucose and will monitor labs to watch for DMII, Hyperlipidemia with goals met with current Rx, Hypothyroidism and remains under the care of their specialist, and Vitamin D deficiency and will update labs for continued management.  she has been compliant with current medications have reviewed them.  The patient denies medication side effects.  Will refill medications. /76   Pulse 76   Temp 95.8 øF (35.4 øC)   Resp 16   Ht 167.6 cm (65.98\")   Wt 78.9 kg (174 lb)   LMP  (LMP Unknown)   SpO2 99%   BMI 28.10 kg/mý     Results for orders placed or performed in visit on 08/28/23   Vitamin B12    Specimen: Blood   Result Value Ref Range    Vitamin B-12 1,018 (H) 211 - 946 pg/mL   Folate    Specimen: Blood   Result Value Ref Range    Folate >20.00 4.78 - 24.20 ng/mL   Vitamin D,25-Hydroxy    Specimen: Blood   Result Value Ref Range    25 Hydroxy, Vitamin D 81.3 30.0 - 100.0 ng/ml   CBC & Differential    Specimen: Blood   Result Value Ref Range    WBC 6.68 3.40 - 10.80 10*3/mm3    RBC 4.13 3.77 - 5.28 10*6/mm3    Hemoglobin 13.7 12.0 - 15.9 g/dL    Hematocrit 40.7 34.0 - 46.6 %    MCV 98.5 (H) 79.0 - 97.0 fL    MCH 33.2 (H) 26.6 - 33.0 pg    MCHC 33.7 31.5 - 35.7 g/dL    RDW 12.1 (L) 12.3 - 15.4 %    Platelets 230 140 - 450 10*3/mm3    Neutrophil Rel % 50.9 42.7 - 76.0 %    Lymphocyte Rel % 36.2 19.6 - 45.3 %    Monocyte Rel % 8.8 5.0 - 12.0 %    Eosinophil Rel % 2.8 0.3 - 6.2 %    Basophil Rel % 1.0 0.0 - 1.5 %    Neutrophils Absolute 3.39 1.70 - 7.00 10*3/mm3    Lymphocytes Absolute 2.42 0.70 - 3.10 10*3/mm3    Monocytes Absolute " 0.59 0.10 - 0.90 10*3/mm3    Eosinophils Absolute 0.19 0.00 - 0.40 10*3/mm3    Basophils Absolute 0.07 0.00 - 0.20 10*3/mm3    Immature Granulocyte Rel % 0.3 0.0 - 0.5 %    Immature Grans Absolute 0.02 0.00 - 0.05 10*3/mm3    nRBC 0.0 0.0 - 0.2 /100 WBC     Colonoscopy DR Mcclendon 6-8-23--polyps---3 yr f/u  Saw Dr. Pemberton for hematology consult for leukocytosis and neutropenia on 2/10/2021 and work-up was negative follow-up as needed  Sees DR Cortés-----derm basal cell cancer  She is on WW and down 15-16 lbs  Some walking  Tolerates Fosamax.  Started 5-18-23  Saw [facility] 3-16-23----f/u on thyroid---alternating dose---works--f/u yearly  Dr Fu left knee replaced Feb  ECG 12 Lead   Date/Time: 1/27/2021 4:11 PM  Performed by: Brendon Mendoza MD  Authorized by: Brendon Mendoza MD   Comparison: compared with previous ECG from 1/12/2021  Similar to previous ECG  Rhythm: sinus tachycardia  Conduction: left anterior fascicular block  Other findings: poor R wave progression  Had neg stress treadmill, echo, Holter 2-2021     DR Mendoza has f/u PRN  Had pre op EKG 1-31-23    The following portions of the patient's history were reviewed and updated as appropriate: allergies, current medications, past family history, past medical history, past social history, past surgical history, and problem list.    Review of Systems   Constitutional:  Negative for diaphoresis.   HENT:  Negative for nosebleeds and trouble swallowing.    Eyes:  Negative for blurred vision and visual disturbance.   Respiratory:  Negative for choking.    Gastrointestinal:  Negative for blood in stool.   Allergic/Immunologic: Negative for immunocompromised state.   Neurological:  Negative for facial asymmetry and speech difficulty.   Psychiatric/Behavioral:  Negative for self-injury and suicidal ideas.      Objective   Physical Exam  Vitals and nursing note reviewed.   Constitutional:       General: She is not in acute distress.     Appearance: Normal  appearance. She is well-developed. She is not ill-appearing or toxic-appearing.   HENT:      Head: Normocephalic.      Right Ear: External ear normal.      Left Ear: External ear normal.      Nose: Nose normal.      Mouth/Throat:      Pharynx: Oropharynx is clear.   Eyes:      General: No scleral icterus.     Conjunctiva/sclera: Conjunctivae normal.      Pupils: Pupils are equal, round, and reactive to light.   Neck:      Thyroid: No thyromegaly.      Vascular: No carotid bruit.   Cardiovascular:      Rate and Rhythm: Normal rate and regular rhythm.      Pulses: Normal pulses.      Heart sounds: Normal heart sounds. No murmur heard.  Pulmonary:      Effort: Pulmonary effort is normal. No respiratory distress.      Breath sounds: Normal breath sounds. No rales.   Musculoskeletal:         General: No deformity. Normal range of motion.      Cervical back: Normal range of motion and neck supple.   Skin:     General: Skin is warm and dry.      Findings: No rash.   Neurological:      General: No focal deficit present.      Mental Status: She is alert and oriented to person, place, and time. Mental status is at baseline.   Psychiatric:         Mood and Affect: Mood normal.         Behavior: Behavior normal.         Thought Content: Thought content normal.         Judgment: Judgment normal.         Assessment & Plan   Diagnoses and all orders for this visit:    1. Vitamin D deficiency (Primary)  -     CBC & Differential  -     Vitamin B12  -     Folate  -     Vitamin D,25-Hydroxy    2. Low serum vitamin B12  -     CBC & Differential  -     Vitamin B12  -     Folate  -     Vitamin D,25-Hydroxy    3. Benign essential hypertension    4. Mixed hyperlipidemia    5. Osteopenia of multiple sites    6. Impaired fasting glucose     Other orders  -     valsartan (DIOVAN) 160 MG tablet; TAKE 1 TABLET EVERY DAY FOR BLOOD PRESSURE  Dispense: 90 tablet; Refill: 1  -     metoprolol succinate XL (TOPROL-XL) 50 MG 24 hr tablet; Take 1  tablet by mouth Daily. For blood pressure and palpitations  Dispense: 90 tablet; Refill: 1  -     hydroCHLOROthiazide (HYDRODIURIL) 25 MG tablet; Take 1 tablet by mouth Daily. For BP  Dispense: 90 tablet; Refill: 1      She is on B12  Plan, Onelia Alegre, was seen today.  she was seen for HTN and continue medication, Imparied fasting glucose and plan follow up labs, diet, and exercise, Hyperlipidemia and will continue current medication, Hypothyroidism and under the care of specialist, and Vitamin D deficiency and will update labs .           Answers submitted by the patient for this visit:  Other (Submitted on 8/25/2023)  Please describe your symptoms.: Medicare wellness  Have you had these symptoms before?: Yes  How long have you been having these symptoms?: Greater than 2 weeks  Please list any medications you are currently taking for this condition.: Follow up on all medications she prescribed  Please describe any probable cause for these symptoms. : General health for high blood pressure & heart palpitations  Primary Reason for Visit (Submitted on 8/25/2023)  What is the primary reason for your visit?: Other

## 2023-09-06 LAB
25(OH)D3+25(OH)D2 SERPL-MCNC: 81.3 NG/ML (ref 30–100)
BASOPHILS # BLD AUTO: 0.07 10*3/MM3 (ref 0–0.2)
BASOPHILS NFR BLD AUTO: 1 % (ref 0–1.5)
EOSINOPHIL # BLD AUTO: 0.19 10*3/MM3 (ref 0–0.4)
EOSINOPHIL NFR BLD AUTO: 2.8 % (ref 0.3–6.2)
ERYTHROCYTE [DISTWIDTH] IN BLOOD BY AUTOMATED COUNT: 12.1 % (ref 12.3–15.4)
FOLATE SERPL-MCNC: >20 NG/ML (ref 4.78–24.2)
HCT VFR BLD AUTO: 40.7 % (ref 34–46.6)
HGB BLD-MCNC: 13.7 G/DL (ref 12–15.9)
IMM GRANULOCYTES # BLD AUTO: 0.02 10*3/MM3 (ref 0–0.05)
IMM GRANULOCYTES NFR BLD AUTO: 0.3 % (ref 0–0.5)
LYMPHOCYTES # BLD AUTO: 2.42 10*3/MM3 (ref 0.7–3.1)
LYMPHOCYTES NFR BLD AUTO: 36.2 % (ref 19.6–45.3)
MCH RBC QN AUTO: 33.2 PG (ref 26.6–33)
MCHC RBC AUTO-ENTMCNC: 33.7 G/DL (ref 31.5–35.7)
MCV RBC AUTO: 98.5 FL (ref 79–97)
MONOCYTES # BLD AUTO: 0.59 10*3/MM3 (ref 0.1–0.9)
MONOCYTES NFR BLD AUTO: 8.8 % (ref 5–12)
NEUTROPHILS # BLD AUTO: 3.39 10*3/MM3 (ref 1.7–7)
NEUTROPHILS NFR BLD AUTO: 50.9 % (ref 42.7–76)
NRBC BLD AUTO-RTO: 0 /100 WBC (ref 0–0.2)
PLATELET # BLD AUTO: 230 10*3/MM3 (ref 140–450)
RBC # BLD AUTO: 4.13 10*6/MM3 (ref 3.77–5.28)
VIT B12 SERPL-MCNC: 1018 PG/ML (ref 211–946)
WBC # BLD AUTO: 6.68 10*3/MM3 (ref 3.4–10.8)

## 2023-12-26 NOTE — TELEPHONE ENCOUNTER
Rx Refill Note  Requested Prescriptions     Pending Prescriptions Disp Refills    Synthroid 88 MCG tablet [Pharmacy Med Name: SYNTHROID 88 MCG Tablet] 39 tablet 3     Sig: TAKE 1 TABLET 3 DAYS A WEEK AND TAKE A 100MCG TABLET 4 DAYS A WEEK      Last office visit with prescribing clinician: 3/16/2023   Last telemedicine visit with prescribing clinician: Visit date not found   Next office visit with prescribing clinician: 3/14/2024                         Would you like a call back once the refill request has been completed: [] Yes [] No    If the office needs to give you a call back, can they leave a voicemail: [] Yes [] No    Addie Cai MA  12/26/23, 07:54 EST

## 2023-12-27 RX ORDER — LEVOTHYROXINE SODIUM 88 MCG
TABLET ORAL
Qty: 39 TABLET | Refills: 0 | Status: SHIPPED | OUTPATIENT
Start: 2023-12-27

## 2024-02-03 LAB
25(OH)D3+25(OH)D2 SERPL-MCNC: 83.7 NG/ML (ref 30–100)
ALBUMIN SERPL-MCNC: 4.6 G/DL (ref 3.5–5.2)
ALBUMIN/GLOB SERPL: 2.3 G/DL
ALP SERPL-CCNC: 67 U/L (ref 39–117)
ALT SERPL-CCNC: 15 U/L (ref 1–33)
AST SERPL-CCNC: 16 U/L (ref 1–32)
BASOPHILS # BLD AUTO: 0.06 10*3/MM3 (ref 0–0.2)
BASOPHILS NFR BLD AUTO: 0.7 % (ref 0–1.5)
BILIRUB SERPL-MCNC: 0.8 MG/DL (ref 0–1.2)
BUN SERPL-MCNC: 20 MG/DL (ref 8–23)
BUN/CREAT SERPL: 22.7 (ref 7–25)
CALCIUM SERPL-MCNC: 9.8 MG/DL (ref 8.6–10.5)
CHLORIDE SERPL-SCNC: 99 MMOL/L (ref 98–107)
CHOLEST SERPL-MCNC: 148 MG/DL (ref 0–200)
CO2 SERPL-SCNC: 28.5 MMOL/L (ref 22–29)
CREAT SERPL-MCNC: 0.88 MG/DL (ref 0.57–1)
EGFRCR SERPLBLD CKD-EPI 2021: 68.6 ML/MIN/1.73
EOSINOPHIL # BLD AUTO: 0.13 10*3/MM3 (ref 0–0.4)
EOSINOPHIL NFR BLD AUTO: 1.5 % (ref 0.3–6.2)
ERYTHROCYTE [DISTWIDTH] IN BLOOD BY AUTOMATED COUNT: 12.4 % (ref 12.3–15.4)
FOLATE SERPL-MCNC: >20 NG/ML (ref 4.78–24.2)
GLOBULIN SER CALC-MCNC: 2 GM/DL
GLUCOSE SERPL-MCNC: 85 MG/DL (ref 65–99)
HBA1C MFR BLD: 5.3 % (ref 4.8–5.6)
HCT VFR BLD AUTO: 39.7 % (ref 34–46.6)
HDLC SERPL-MCNC: 54 MG/DL (ref 40–60)
HGB BLD-MCNC: 13.5 G/DL (ref 12–15.9)
IMM GRANULOCYTES # BLD AUTO: 0.01 10*3/MM3 (ref 0–0.05)
IMM GRANULOCYTES NFR BLD AUTO: 0.1 % (ref 0–0.5)
LDLC SERPL CALC-MCNC: 66 MG/DL (ref 0–100)
LYMPHOCYTES # BLD AUTO: 2.5 10*3/MM3 (ref 0.7–3.1)
LYMPHOCYTES NFR BLD AUTO: 28.6 % (ref 19.6–45.3)
MCH RBC QN AUTO: 33.9 PG (ref 26.6–33)
MCHC RBC AUTO-ENTMCNC: 34 G/DL (ref 31.5–35.7)
MCV RBC AUTO: 99.7 FL (ref 79–97)
MONOCYTES # BLD AUTO: 0.58 10*3/MM3 (ref 0.1–0.9)
MONOCYTES NFR BLD AUTO: 6.6 % (ref 5–12)
NEUTROPHILS # BLD AUTO: 5.45 10*3/MM3 (ref 1.7–7)
NEUTROPHILS NFR BLD AUTO: 62.5 % (ref 42.7–76)
NRBC BLD AUTO-RTO: 0 /100 WBC (ref 0–0.2)
PLATELET # BLD AUTO: 238 10*3/MM3 (ref 140–450)
POTASSIUM SERPL-SCNC: 4.2 MMOL/L (ref 3.5–5.2)
PROT SERPL-MCNC: 6.6 G/DL (ref 6–8.5)
RBC # BLD AUTO: 3.98 10*6/MM3 (ref 3.77–5.28)
SODIUM SERPL-SCNC: 137 MMOL/L (ref 136–145)
TRIGL SERPL-MCNC: 169 MG/DL (ref 0–150)
VIT B12 SERPL-MCNC: 733 PG/ML (ref 211–946)
VLDLC SERPL CALC-MCNC: 28 MG/DL (ref 5–40)
WBC # BLD AUTO: 8.73 10*3/MM3 (ref 3.4–10.8)

## 2024-02-07 ENCOUNTER — OFFICE VISIT (OUTPATIENT)
Dept: FAMILY MEDICINE CLINIC | Facility: CLINIC | Age: 74
End: 2024-02-07
Payer: MEDICARE

## 2024-02-07 VITALS
TEMPERATURE: 97.6 F | OXYGEN SATURATION: 98 % | RESPIRATION RATE: 16 BRPM | DIASTOLIC BLOOD PRESSURE: 68 MMHG | BODY MASS INDEX: 26.84 KG/M2 | SYSTOLIC BLOOD PRESSURE: 130 MMHG | WEIGHT: 167 LBS | HEIGHT: 66 IN | HEART RATE: 55 BPM

## 2024-02-07 DIAGNOSIS — M79.644 CHRONIC THUMB PAIN, BILATERAL: ICD-10-CM

## 2024-02-07 DIAGNOSIS — E03.9 PRIMARY HYPOTHYROIDISM: ICD-10-CM

## 2024-02-07 DIAGNOSIS — M79.645 CHRONIC THUMB PAIN, BILATERAL: ICD-10-CM

## 2024-02-07 DIAGNOSIS — G89.29 CHRONIC THUMB PAIN, BILATERAL: ICD-10-CM

## 2024-02-07 DIAGNOSIS — Z12.31 ENCOUNTER FOR SCREENING MAMMOGRAM FOR BREAST CANCER: ICD-10-CM

## 2024-02-07 DIAGNOSIS — I10 BENIGN ESSENTIAL HYPERTENSION: Primary | ICD-10-CM

## 2024-02-07 DIAGNOSIS — E55.9 VITAMIN D DEFICIENCY: ICD-10-CM

## 2024-02-07 DIAGNOSIS — R00.2 PALPITATIONS: ICD-10-CM

## 2024-02-07 PROCEDURE — 1160F RVW MEDS BY RX/DR IN RCRD: CPT | Performed by: PHYSICIAN ASSISTANT

## 2024-02-07 PROCEDURE — G0439 PPPS, SUBSEQ VISIT: HCPCS | Performed by: PHYSICIAN ASSISTANT

## 2024-02-07 PROCEDURE — 1170F FXNL STATUS ASSESSED: CPT | Performed by: PHYSICIAN ASSISTANT

## 2024-02-07 PROCEDURE — 99214 OFFICE O/P EST MOD 30 MIN: CPT | Performed by: PHYSICIAN ASSISTANT

## 2024-02-07 PROCEDURE — 1126F AMNT PAIN NOTED NONE PRSNT: CPT | Performed by: PHYSICIAN ASSISTANT

## 2024-02-07 PROCEDURE — 3075F SYST BP GE 130 - 139MM HG: CPT | Performed by: PHYSICIAN ASSISTANT

## 2024-02-07 PROCEDURE — 3078F DIAST BP <80 MM HG: CPT | Performed by: PHYSICIAN ASSISTANT

## 2024-02-07 RX ORDER — METOPROLOL SUCCINATE 50 MG/1
50 TABLET, EXTENDED RELEASE ORAL DAILY
Qty: 90 TABLET | Refills: 1 | Status: SHIPPED | OUTPATIENT
Start: 2024-02-07 | End: 2024-02-07 | Stop reason: SDUPTHER

## 2024-02-07 RX ORDER — ALENDRONATE SODIUM 70 MG/1
70 TABLET ORAL
Qty: 13 TABLET | Refills: 3 | Status: SHIPPED | OUTPATIENT
Start: 2024-02-07

## 2024-02-07 RX ORDER — METOPROLOL SUCCINATE 50 MG/1
50 TABLET, EXTENDED RELEASE ORAL DAILY
Qty: 90 TABLET | Refills: 1 | Status: SHIPPED | OUTPATIENT
Start: 2024-02-07

## 2024-02-07 RX ORDER — VALSARTAN 160 MG/1
TABLET ORAL
Qty: 90 TABLET | Refills: 1 | Status: SHIPPED | OUTPATIENT
Start: 2024-02-07

## 2024-02-07 RX ORDER — ATORVASTATIN CALCIUM 10 MG/1
10 TABLET, FILM COATED ORAL DAILY
Qty: 90 TABLET | Refills: 3 | Status: SHIPPED | OUTPATIENT
Start: 2024-02-07

## 2024-02-07 NOTE — PROGRESS NOTES
"Subjective   Onelia Alegre is a 73 y.o. female.     Hypertension  Pertinent negatives include no blurred vision.   Hyperlipidemia  Exacerbating diseases include hypothyroidism.   Hypothyroidism  Associated symptoms include arthralgias. Pertinent negatives include no diaphoresis.       Since the last visit, she has overall felt well.  She has Primary Hypertension and well controlled on current medication, Hyperlipidemia with goals met with current Rx, Hypothyroidism and remains under the care of their specialist, and Vitamin D deficiency and will update labs for continued management.  she has been compliant with current medications have reviewed them.  The patient denies medication side effects.  Will refill medications. /68   Pulse 55   Temp 97.6 °F (36.4 °C)   Resp 16   Ht 167.6 cm (65.98\")   Wt 75.8 kg (167 lb)   LMP  (LMP Unknown)   SpO2 98%   BMI 26.97 kg/m²   Walking for exercise    Results for orders placed or performed in visit on 02/02/24   Comprehensive Metabolic Panel   Result Value Ref Range    Glucose 85 65 - 99 mg/dL    BUN 20 8 - 23 mg/dL    Creatinine 0.88 0.57 - 1.00 mg/dL    EGFR Result 68.6 >60.0 mL/min/1.73    BUN/Creatinine Ratio 22.7 7.0 - 25.0    Sodium 137 136 - 145 mmol/L    Potassium 4.2 3.5 - 5.2 mmol/L    Chloride 99 98 - 107 mmol/L    Total CO2 28.5 22.0 - 29.0 mmol/L    Calcium 9.8 8.6 - 10.5 mg/dL    Total Protein 6.6 6.0 - 8.5 g/dL    Albumin 4.6 3.5 - 5.2 g/dL    Globulin 2.0 gm/dL    A/G Ratio 2.3 g/dL    Total Bilirubin 0.8 0.0 - 1.2 mg/dL    Alkaline Phosphatase 67 39 - 117 U/L    AST (SGOT) 16 1 - 32 U/L    ALT (SGPT) 15 1 - 33 U/L   Lipid Panel   Result Value Ref Range    Total Cholesterol 148 0 - 200 mg/dL    Triglycerides 169 (H) 0 - 150 mg/dL    HDL Cholesterol 54 40 - 60 mg/dL    VLDL Cholesterol Tevin 28 5 - 40 mg/dL    LDL Chol Calc (NIH) 66 0 - 100 mg/dL   Hemoglobin A1c   Result Value Ref Range    Hemoglobin A1C 5.30 4.80 - 5.60 %   Folate   Result Value " Ref Range    Folate >20.00 4.78 - 24.20 ng/mL   Vitamin D,25-Hydroxy   Result Value Ref Range    25 Hydroxy, Vitamin D 83.7 30.0 - 100.0 ng/ml   Vitamin B12   Result Value Ref Range    Vitamin B-12 733 211 - 946 pg/mL   CBC & Differential   Result Value Ref Range    WBC 8.73 3.40 - 10.80 10*3/mm3    RBC 3.98 3.77 - 5.28 10*6/mm3    Hemoglobin 13.5 12.0 - 15.9 g/dL    Hematocrit 39.7 34.0 - 46.6 %    MCV 99.7 (H) 79.0 - 97.0 fL    MCH 33.9 (H) 26.6 - 33.0 pg    MCHC 34.0 31.5 - 35.7 g/dL    RDW 12.4 12.3 - 15.4 %    Platelets 238 140 - 450 10*3/mm3    Neutrophil Rel % 62.5 42.7 - 76.0 %    Lymphocyte Rel % 28.6 19.6 - 45.3 %    Monocyte Rel % 6.6 5.0 - 12.0 %    Eosinophil Rel % 1.5 0.3 - 6.2 %    Basophil Rel % 0.7 0.0 - 1.5 %    Neutrophils Absolute 5.45 1.70 - 7.00 10*3/mm3    Lymphocytes Absolute 2.50 0.70 - 3.10 10*3/mm3    Monocytes Absolute 0.58 0.10 - 0.90 10*3/mm3    Eosinophils Absolute 0.13 0.00 - 0.40 10*3/mm3    Basophils Absolute 0.06 0.00 - 0.20 10*3/mm3    Immature Granulocyte Rel % 0.1 0.0 - 0.5 %    Immature Grans Absolute 0.01 0.00 - 0.05 10*3/mm3    nRBC 0.0 0.0 - 0.2 /100 WBC   ECG 12 Lead   Date/Time: 1/27/2021 4:11 PM  Performed by: Brendon Mendoza MD  Authorized by: Brendon Mendoza MD   Comparison: compared with previous ECG from 1/12/2021  Similar to previous ECG  Rhythm: sinus tachycardia  Conduction: left anterior fascicular block  Other findings: poor R wave progression  Had neg stress treadmill, echo, Holter 2-2021  Neg stress treadmill 1-27-21  DR Mendoza has f/u PRN  Had pre op EKG 1-31-23   Some walking  Tolerates Fosamax.  Started 5-18-23 for Osteopenia   Colonoscopy DR Mcclendon 6-8-23--polyps---3 yr f/u  Saw Dr. Pemberton for hematology consult for leukocytosis and neutropenia on 2/10/2021 and work-up was negative follow-up as needed  Sees DR Cortés-----derm basal cell cancer  Saw endo 3-16-23----f/u on thyroid---alternating dose---works--f/u yearly   The following portions of the  patient's history were reviewed and updated as appropriate: allergies, current medications, past family history, past medical history, past social history, past surgical history, and problem list.    Review of Systems   Constitutional:  Negative for diaphoresis.   HENT:  Negative for nosebleeds and trouble swallowing.    Eyes:  Negative for blurred vision and visual disturbance.   Respiratory:  Negative for choking.    Gastrointestinal:  Negative for blood in stool.   Musculoskeletal:  Positive for arthralgias.   Allergic/Immunologic: Negative for immunocompromised state.   Neurological:  Negative for facial asymmetry and speech difficulty.   Psychiatric/Behavioral:  Negative for self-injury and suicidal ideas.        Objective   Physical Exam  Vitals and nursing note reviewed.   Constitutional:       General: She is not in acute distress.     Appearance: She is well-developed. She is not ill-appearing or toxic-appearing.   HENT:      Head: Normocephalic.      Right Ear: External ear normal.      Left Ear: External ear normal.      Nose: Nose normal.      Mouth/Throat:      Pharynx: Oropharynx is clear.   Eyes:      General: No scleral icterus.     Conjunctiva/sclera: Conjunctivae normal.      Pupils: Pupils are equal, round, and reactive to light.   Neck:      Thyroid: No thyromegaly.   Cardiovascular:      Rate and Rhythm: Normal rate and regular rhythm.      Heart sounds: Normal heart sounds. No murmur heard.  Pulmonary:      Effort: Pulmonary effort is normal. No respiratory distress.      Breath sounds: Normal breath sounds.   Musculoskeletal:         General: Tenderness present. No deformity. Normal range of motion.      Cervical back: Normal range of motion and neck supple.   Skin:     General: Skin is warm and dry.      Findings: No rash.   Neurological:      General: No focal deficit present.      Mental Status: She is alert and oriented to person, place, and time. Mental status is at baseline.    Psychiatric:         Mood and Affect: Mood normal.         Behavior: Behavior normal.         Thought Content: Thought content normal.         Judgment: Judgment normal.           Assessment & Plan   Diagnoses and all orders for this visit:    1. Benign essential hypertension (Primary)    2. Palpitations    3. Vitamin D deficiency    4. Primary hypothyroidism    5. Encounter for screening mammogram for breast cancer  -     Mammo Screening Digital Tomosynthesis Bilateral With CAD    6. Chronic thumb pain, bilateral    Other orders  -     valsartan (DIOVAN) 160 MG tablet; TAKE 1 TABLET EVERY DAY FOR BLOOD PRESSURE  Dispense: 90 tablet; Refill: 1  -     Discontinue: metoprolol succinate XL (TOPROL-XL) 50 MG 24 hr tablet; Take 1 tablet by mouth Daily. For blood pressure and palpitations  Dispense: 90 tablet; Refill: 1  -     metoprolol succinate XL (TOPROL-XL) 50 MG 24 hr tablet; Take 1 tablet by mouth Daily. For blood pressure and palpitations  Dispense: 90 tablet; Refill: 1  -     atorvastatin (LIPITOR) 10 MG tablet; Take 1 tablet by mouth Daily. For cholesterol  Dispense: 90 tablet; Refill: 3  -     alendronate (Fosamax) 70 MG tablet; Take 1 tablet by mouth Every 7 (Seven) Days.  Dispense: 13 tablet; Refill: 3    Great job on continuing to lose weight and we discussed watching her blood pressure to make sure her systolic does not drop below 115  Referral to hand surgeon the injection she has had previously in her thumb metacarpal joints help greatly  Sees DR Cortés-----derm basal cell cancer  Saw endo 3-16-23----f/u on thyroid---alternating dose---works--f/u yearly   Plan, Onelia Alegre, was seen today.  she was seen for HTN and continue medication, Hyperlipidemia and will continue current medication, Hypothyroidism and under the care of specialist, and Vitamin D deficiency and supplemented.     Tolerates Fosamax.  Started 5-18-23 for Osteopenia     Answers submitted by the patient for this visit:  Other  (Submitted on 2/1/2024)  Please describe your symptoms.: Medication check up  Have you had these symptoms before?: Yes  How long have you been having these symptoms?: Greater than 2 weeks  Please list any medications you are currently taking for this condition.: All meds  Primary Reason for Visit (Submitted on 2/1/2024)  What is the primary reason for your visit?: Other

## 2024-02-07 NOTE — PATIENT INSTRUCTIONS
"Hypertension, Adult  High blood pressure (hypertension) is when the force of blood pumping through the arteries is too strong. The arteries are the blood vessels that carry blood from the heart throughout the body. Hypertension forces the heart to work harder to pump blood and may cause arteries to become narrow or stiff. Untreated or uncontrolled hypertension can lead to a heart attack, heart failure, a stroke, kidney disease, and other problems.  A blood pressure reading consists of a higher number over a lower number. Ideally, your blood pressure should be below 120/80. The first (\"top\") number is called the systolic pressure. It is a measure of the pressure in your arteries as your heart beats. The second (\"bottom\") number is called the diastolic pressure. It is a measure of the pressure in your arteries as the heart relaxes.  What are the causes?  The exact cause of this condition is not known. There are some conditions that result in high blood pressure.  What increases the risk?  Certain factors may make you more likely to develop high blood pressure. Some of these risk factors are under your control, including:  Smoking.  Not getting enough exercise or physical activity.  Being overweight.  Having too much fat, sugar, calories, or salt (sodium) in your diet.  Drinking too much alcohol.  Other risk factors include:  Having a personal history of heart disease, diabetes, high cholesterol, or kidney disease.  Stress.  Having a family history of high blood pressure and high cholesterol.  Having obstructive sleep apnea.  Age. The risk increases with age.  What are the signs or symptoms?  High blood pressure may not cause symptoms. Very high blood pressure (hypertensive crisis) may cause:  Headache.  Fast or irregular heartbeats (palpitations).  Shortness of breath.  Nosebleed.  Nausea and vomiting.  Vision changes.  Severe chest pain, dizziness, and seizures.  How is this diagnosed?  This condition is diagnosed by " measuring your blood pressure while you are seated, with your arm resting on a flat surface, your legs uncrossed, and your feet flat on the floor. The cuff of the blood pressure monitor will be placed directly against the skin of your upper arm at the level of your heart. Blood pressure should be measured at least twice using the same arm. Certain conditions can cause a difference in blood pressure between your right and left arms.  If you have a high blood pressure reading during one visit or you have normal blood pressure with other risk factors, you may be asked to:  Return on a different day to have your blood pressure checked again.  Monitor your blood pressure at home for 1 week or longer.  If you are diagnosed with hypertension, you may have other blood or imaging tests to help your health care provider understand your overall risk for other conditions.  How is this treated?  This condition is treated by making healthy lifestyle changes, such as eating healthy foods, exercising more, and reducing your alcohol intake. You may be referred for counseling on a healthy diet and physical activity.  Your health care provider may prescribe medicine if lifestyle changes are not enough to get your blood pressure under control and if:  Your systolic blood pressure is above 130.  Your diastolic blood pressure is above 80.  Your personal target blood pressure may vary depending on your medical conditions, your age, and other factors.  Follow these instructions at home:  Eating and drinking    Eat a diet that is high in fiber and potassium, and low in sodium, added sugar, and fat. An example of this eating plan is called the DASH diet. DASH stands for Dietary Approaches to Stop Hypertension. To eat this way:  Eat plenty of fresh fruits and vegetables. Try to fill one half of your plate at each meal with fruits and vegetables.  Eat whole grains, such as whole-wheat pasta, brown rice, or whole-grain bread. Fill about one  fourth of your plate with whole grains.  Eat or drink low-fat dairy products, such as skim milk or low-fat yogurt.  Avoid fatty cuts of meat, processed or cured meats, and poultry with skin. Fill about one fourth of your plate with lean proteins, such as fish, chicken without skin, beans, eggs, or tofu.  Avoid pre-made and processed foods. These tend to be higher in sodium, added sugar, and fat.  Reduce your daily sodium intake. Many people with hypertension should eat less than 1,500 mg of sodium a day.  Do not drink alcohol if:  Your health care provider tells you not to drink.  You are pregnant, may be pregnant, or are planning to become pregnant.  If you drink alcohol:  Limit how much you have to:  0-1 drink a day for women.  0-2 drinks a day for men.  Know how much alcohol is in your drink. In the [place] one drink equals one 12 oz bottle of beer (355 mL), one 5 oz glass of wine (148 mL), or one 1½ oz glass of hard liquor (44 mL).  Lifestyle    Work with your health care provider to maintain a healthy body weight or to lose weight. Ask what an ideal weight is for you.  Get at least 30 minutes of exercise that causes your heart to beat faster (aerobic exercise) most days of the week. Activities may include walking, swimming, or biking.  Include exercise to strengthen your muscles (resistance exercise), such as Pilates or lifting weights, as part of your weekly exercise routine. Try to do these types of exercises for 30 minutes at least 3 days a week.  Do not use any products that contain nicotine or tobacco. These products include cigarettes, chewing tobacco, and vaping devices, such as e-cigarettes. If you need help quitting, ask your health care provider.  Monitor your blood pressure at home as told by your health care provider.  Keep all follow-up visits. This is important.  Medicines  Take over-the-counter and prescription medicines only as told by your health care provider. Follow directions carefully. Blood  pressure medicines must be taken as prescribed.  Do not skip doses of blood pressure medicine. Doing this puts you at risk for problems and can make the medicine less effective.  Ask your health care provider about side effects or reactions to medicines that you should watch for.  Contact a health care provider if you:  Think you are having a reaction to a medicine you are taking.  Have headaches that keep coming back (recurring).  Feel dizzy.  Have swelling in your ankles.  Have trouble with your vision.  Get help right away if you:  Develop a severe headache or confusion.  Have unusual weakness or numbness.  Feel faint.  Have severe pain in your chest or abdomen.  Vomit repeatedly.  Have trouble breathing.  These symptoms may be an emergency. Get help right away. Call 911.  Do not wait to see if the symptoms will go away.  Do not drive yourself to the hospital.  Summary  Hypertension is when the force of blood pumping through your arteries is too strong. If this condition is not controlled, it may put you at risk for serious complications.  Your personal target blood pressure may vary depending on your medical conditions, your age, and other factors. For most people, a normal blood pressure is less than 120/80.  Hypertension is treated with lifestyle changes, medicines, or a combination of both. Lifestyle changes include losing weight, eating a healthy, low-sodium diet, exercising more, and limiting alcohol.  This information is not intended to replace advice given to you by your health care provider. Make sure you discuss any questions you have with your health care provider.  Document Revised: 10/25/2022 Document Reviewed: 10/25/2022  Elsevier Patient Education 2023 [employer]    Medicare Wellness  Personal Prevention Plan of Service     Date of Office Visit:    Encounter Provider:  Raven Traivs PA-C  Place of Service:  [facility]  Patient Name: Onelia Alegre  :   1950    As part of the Medicare Wellness portion of your visit today, we are providing you with this personalized preventive plan of services (PPPS). This plan is based upon recommendations of the [place] Preventive Services Task Force (USPSTF) and the Advisory Committee on Immunization Practices (ACIP).    This lists the preventive care services that should be considered, and provides dates of when you are due. Items listed as completed are up-to-date and do not require any further intervention.    Health Maintenance   Topic Date Due    RSV Vaccine - Adults (1 - 1-dose 60+ series) Never done    BMI FOLLOWUP  02/06/2024    MAMMOGRAM  04/24/2024    LIPID PANEL  02/02/2025    ANNUAL WELLNESS VISIT  02/07/2025    DXA SCAN  04/24/2025    TDAP/TD VACCINES (4 - Td or Tdap) 12/07/2026    COLORECTAL CANCER SCREENING  06/08/2028    COVID-19 Vaccine  Completed    INFLUENZA VACCINE  Completed    Pneumococcal Vaccine 65+  Completed    HEPATITIS C SCREENING  Addressed    PAP SMEAR  Discontinued    ZOSTER VACCINE  Discontinued       Orders Placed This Encounter   Procedures    Mammo Screening Digital Tomosynthesis Bilateral With CAD     Scheduling Instructions:      Set up [facility]     Order Specific Question:   Reason for Exam:     Answer:   screen     Order Specific Question:   Release to patient     Answer:   Routine Release [[id number]]    Ambulatory Referral to Hand Surgery     Referral Priority:   Routine     Referral Type:   Consultation     Referral Reason:   Specialty Services Required     Referred to Provider:   Wali William MD     Requested Specialty:   Hand Surgery     Number of Visits Requested:   1       Return in about 6 months (around 8/7/2024), or if symptoms worsen or fail to improve.

## 2024-02-07 NOTE — PROGRESS NOTES
The ABCs of the Annual Wellness Visit  Subsequent Medicare Wellness Visit    Subjective      Onelia Alegre is a 73 y.o. female who presents for a Subsequent Medicare Wellness Visit.    The following portions of the patient's history were reviewed and   updated as appropriate: allergies, current medications, past family history, past medical history, past social history, past surgical history, and problem list.    Compared to one year ago, the patient feels her physical   health is better.    Compared to one year ago, the patient feels her mental   health is better.    Recent Hospitalizations:  She was not admitted to the hospital during the last year.       Current Medical Providers:  Patient Care Team:  Raven Travis PA-C as PCP - General (Family Medicine)  Pat Angel MD as Consulting Physician (Orthopedic Surgery)  Lew Farooq MD as Consulting Physician (Endocrinology)  Brendon Mendoza MD as Consulting Physician (Cardiology)  Raven Travis PA-C as Referring Physician (Family Medicine)  German Pemberton MD as Consulting Physician (Hematology and Oncology)  Dickson Stanford MD as Consulting Physician (Orthopedic Surgery)  Lauren Shoemaker MD as Consulting Physician (Endocrinology)  Lyle Cortés MD as Consulting Physician (Dermatology)    Outpatient Medications Prior to Visit   Medication Sig Dispense Refill    ACETAMINOPHEN 8 HOUR PO Take 2 tablets by mouth.      alendronate (Fosamax) 70 MG tablet Take 1 tablet by mouth Every 7 (Seven) Days. 13 tablet 3    atorvastatin (LIPITOR) 10 MG tablet Take 1 tablet by mouth Daily. For cholesterol 90 tablet 3    Calcium Carbonate-Vitamin D (CALCIUM-VITAMIN D) 600-200 MG-UNIT capsule Take by mouth.      cetirizine (ZyrTEC) 10 MG tablet Take 1 tablet by mouth Daily. Take one tablet daily as needed      cholecalciferol (VITAMIN D3) 1000 UNITS tablet Take 2 tablets by mouth Daily.      hydroCHLOROthiazide (HYDRODIURIL) 25 MG tablet Take 1 tablet by  mouth Daily. For BP 90 tablet 1    melatonin tablet Take by mouth.      metoprolol succinate XL (TOPROL-XL) 50 MG 24 hr tablet Take 1 tablet by mouth Daily. For blood pressure and palpitations 90 tablet 1    Multiple Vitamin (MULTIVITAMIN) tablet Take 1 tablet by mouth Daily.      Synthroid 100 MCG tablet TAKE 1 TABLET (100MCG) 4 DAYS A WEEK (TAKE 88MCG TABLET 3 DAYS A WEEK) 52 tablet 0    Synthroid 88 MCG tablet TAKE 1 TABLET 3 DAYS A WEEK AND TAKE A 100MCG TABLET 4 DAYS A WEEK 39 tablet 0    Turmeric 500 MG capsule       valsartan (DIOVAN) 160 MG tablet TAKE 1 TABLET EVERY DAY FOR BLOOD PRESSURE 90 tablet 1    vitamin B-12 (CYANOCOBALAMIN) 1000 MCG tablet Take 1 tablet by mouth Daily.       No facility-administered medications prior to visit.       No opioid medication identified on active medication list. I have reviewed chart for other potential  high risk medication/s and harmful drug interactions in the elderly.        Aspirin is not on active medication list.  Aspirin use is not indicated based on review of current medical condition/s. Risk of harm outweighs potential benefits.  .    Patient Active Problem List   Diagnosis    Benign essential hypertension    Osteopenia    Excess weight    Localized edema    Thyroid function test abnormal    Primary hypothyroidism    Vitamin D deficiency    Dyslipidemia    Hyperuricemia    Symptomatic menopausal or female climacteric states    Renal insufficiency    Palpitations    Neutrophilic leukocytosis    Easy bruising    Arthritis of knee, left    Hyperlipidemia    Hypertension    Primary osteoarthritis of left knee    Hypothyroidism    Impaired fasting glucose      Advance Care Planning   Advance Care Planning     Advance Directive is not on file.  ACP discussion was declined by the patient. Patient has an advance directive (not in EMR), copy requested.     Objective    Vitals:    02/07/24 1108   BP: 130/68   Pulse: 55   Resp: 16   Temp: 97.6 °F (36.4 °C)   SpO2: 98%  "  Weight: 75.8 kg (167 lb)   Height: 167.6 cm (65.98\")   PainSc: 0-No pain     Estimated body mass index is 26.97 kg/m² as calculated from the following:    Height as of this encounter: 167.6 cm (65.98\").    Weight as of this encounter: 75.8 kg (167 lb).    BMI is >= 25 and <30. (Overweight) The following options were offered after discussion;: working on this      Does the patient have evidence of cognitive impairment?   No    Lab Results   Component Value Date    CHLPL 148 2024    TRIG 169 (H) 2024    HDL 54 2024    LDL 66 2024    VLDL 28 2024    HGBA1C 5.30 2024          HEALTH RISK ASSESSMENT    Smoking Status:  Social History     Tobacco Use   Smoking Status Never   Smokeless Tobacco Never     Alcohol Consumption:  Social History     Substance and Sexual Activity   Alcohol Use Yes    Comment: Only occasionally     Fall Risk Screen:    ROB Fall Risk Assessment was completed, and patient is at LOW risk for falls.Assessment completed on:2024    Depression Screenin/7/2024    11:09 AM   PHQ-2/PHQ-9 Depression Screening   Little Interest or Pleasure in Doing Things 0-->not at all   Feeling Down, Depressed or Hopeless 0-->not at all   PHQ-9: Brief Depression Severity Measure Score 0       Health Habits and Functional and Cognitive Screenin/7/2024    11:00 AM   Functional & Cognitive Status   Do you have difficulty preparing food and eating? No   Do you have difficulty bathing yourself, getting dressed or grooming yourself? No   Do you have difficulty using the toilet? No   Do you have difficulty moving around from place to place? No   Do you have trouble with steps or getting out of a bed or a chair? No   Current Diet Well Balanced Diet   Dental Exam Up to date   Eye Exam Not up to date   Exercise (times per week) 3 times per week   Current Exercises Include Walking   Do you need help using the phone?  No   Are you deaf or do you have serious difficulty " hearing?  No   Do you need help to go to places out of walking distance? No   Do you need help shopping? No   Do you need help preparing meals?  No   Do you need help with housework?  No   Do you need help with laundry? No   Do you need help taking your medications? No   Do you need help managing money? No   Do you ever drive or ride in a car without wearing a seat belt? No   Have you felt unusual stress, anger or loneliness in the last month? No   Who do you live with? Spouse   If you need help, do you have trouble finding someone available to you? No   Have you been bothered in the last four weeks by sexual problems? No   Do you have difficulty concentrating, remembering or making decisions? No       Age-appropriate Screening Schedule:  Refer to the list below for future screening recommendations based on patient's age, sex and/or medical conditions. Orders for these recommended tests are listed in the plan section. The patient has been provided with a written plan.    Health Maintenance   Topic Date Due    RSV Vaccine - Adults (1 - 1-dose 60+ series) Never done    ANNUAL WELLNESS VISIT  08/29/2023    BMI FOLLOWUP  02/06/2024    MAMMOGRAM  04/24/2024    LIPID PANEL  02/02/2025    DXA SCAN  04/24/2025    TDAP/TD VACCINES (4 - Td or Tdap) 12/07/2026    COLORECTAL CANCER SCREENING  06/08/2028    COVID-19 Vaccine  Completed    INFLUENZA VACCINE  Completed    Pneumococcal Vaccine 65+  Completed    HEPATITIS C SCREENING  Addressed    PAP SMEAR  Discontinued    ZOSTER VACCINE  Discontinued                  CMS Preventative Services Quick Reference  Risk Factors Identified During Encounter:    Already working on diet and exercise    The above risks/problems have been discussed with the patient.  Pertinent information has been shared with the patient in the After Visit Summary.    There are no diagnoses linked to this encounter.    Follow Up:   Next Medicare Wellness visit to be scheduled in 1 year.      An After Visit  Summary and PPPS were made available to the patient.            Answers submitted by the patient for this visit:  Other (Submitted on 2/1/2024)  Please describe your symptoms.: Medication check up  Have you had these symptoms before?: Yes  How long have you been having these symptoms?: Greater than 2 weeks  Please list any medications you are currently taking for this condition.: All meds  Primary Reason for Visit (Submitted on 2/1/2024)  What is the primary reason for your visit?: Other

## 2024-03-05 ENCOUNTER — TELEPHONE (OUTPATIENT)
Dept: ENDOCRINOLOGY | Age: 74
End: 2024-03-05
Payer: MEDICARE

## 2024-03-05 DIAGNOSIS — E03.9 PRIMARY HYPOTHYROIDISM: Primary | ICD-10-CM

## 2024-03-07 DIAGNOSIS — E03.9 PRIMARY HYPOTHYROIDISM: ICD-10-CM

## 2024-03-07 LAB
T4 FREE SERPL-MCNC: 2.11 NG/DL (ref 0.93–1.7)
TSH SERPL DL<=0.005 MIU/L-ACNC: 1.75 UIU/ML (ref 0.27–4.2)

## 2024-03-14 ENCOUNTER — OFFICE VISIT (OUTPATIENT)
Dept: ENDOCRINOLOGY | Age: 74
End: 2024-03-14
Payer: MEDICARE

## 2024-03-14 VITALS
TEMPERATURE: 96.8 F | WEIGHT: 162.4 LBS | HEIGHT: 66 IN | HEART RATE: 75 BPM | OXYGEN SATURATION: 98 % | BODY MASS INDEX: 26.1 KG/M2

## 2024-03-14 DIAGNOSIS — E03.9 PRIMARY HYPOTHYROIDISM: Primary | ICD-10-CM

## 2024-03-14 PROCEDURE — 1160F RVW MEDS BY RX/DR IN RCRD: CPT | Performed by: NURSE PRACTITIONER

## 2024-03-14 PROCEDURE — 1159F MED LIST DOCD IN RCRD: CPT | Performed by: NURSE PRACTITIONER

## 2024-03-14 PROCEDURE — 99213 OFFICE O/P EST LOW 20 MIN: CPT | Performed by: NURSE PRACTITIONER

## 2024-03-14 RX ORDER — LEVOTHYROXINE SODIUM 100 MCG
TABLET ORAL
Qty: 48 TABLET | Refills: 2 | Status: SHIPPED | OUTPATIENT
Start: 2024-03-14

## 2024-03-14 RX ORDER — LEVOTHYROXINE SODIUM 88 MCG
TABLET ORAL
Qty: 39 TABLET | Refills: 2 | Status: SHIPPED | OUTPATIENT
Start: 2024-03-14

## 2024-03-14 NOTE — PROGRESS NOTES
"Chief Complaint  Hypothyroidism    Subjective        Onelia Alegre presents to [facility] ENDOCRINOLOGY  History of Present Illness    Hypothyroidism, referred to Dr. Farooq 4/2017 for ABN thyroid function  3/2023: Change Synthroid to 100 mcg 4 days a week and 88 mcg 3 days a week   Denies s/s of hyper/hypothyroid    Feeling well overall without complaints     Objective   Vital Signs:  Pulse 75   Temp 96.8 °F (36 °C) (Temporal)   Ht 167.6 cm (65.98\")   Wt 73.7 kg (162 lb 6.4 oz)   SpO2 98%   BMI 26.23 kg/m²   Estimated body mass index is 26.23 kg/m² as calculated from the following:    Height as of this encounter: 167.6 cm (65.98\").    Weight as of this encounter: 73.7 kg (162 lb 6.4 oz).           Physical Exam  Vitals reviewed.   Constitutional:       General: She is not in acute distress.  HENT:      Head: Normocephalic and atraumatic.   Cardiovascular:      Rate and Rhythm: Normal rate.   Pulmonary:      Effort: Pulmonary effort is normal. No respiratory distress.   Musculoskeletal:         General: No signs of injury. Normal range of motion.      Cervical back: Normal range of motion and neck supple. No tenderness.   Lymphadenopathy:      Cervical: No cervical adenopathy.   Skin:     General: Skin is warm and dry.   Neurological:      Mental Status: She is alert and oriented to person, place, and time. Mental status is at baseline.   Psychiatric:         Mood and Affect: Mood normal.         Behavior: Behavior normal.         Thought Content: Thought content normal.         Judgment: Judgment normal.        Result Review :    The following data was reviewed by: EMI Arteaga on 03/14/2024:  Common labs          9/5/2023    10:09 2/2/2024    14:26   Common Labs   Glucose  85    BUN  20    Creatinine  0.88    Sodium  137    Potassium  4.2    Chloride  99    Calcium  9.8    Total Protein  6.6    Albumin  4.6    Total Bilirubin  0.8    Alkaline Phosphatase  67    AST (SGOT)  16  "   ALT (SGPT)  15    WBC 6.68  8.73    Hemoglobin 13.7  13.5    Hematocrit 40.7  39.7    Platelets 230  238    Total Cholesterol  148    Triglycerides  169    HDL Cholesterol  54    LDL Cholesterol   66    Hemoglobin A1C  5.30                   Assessment and Plan     Diagnoses and all orders for this visit:    1. Primary hypothyroidism (Primary)  -     Synthroid 88 MCG tablet; TAKE 1 TABLET 3 DAYS A WEEK AND TAKE A 100MCG TABLET 4 DAYS A WEEK  Dispense: 39 tablet; Refill: 2  -     Synthroid 100 MCG tablet; TAKE 1 TABLET (100MCG) 4 DAYS A WEEK (TAKE 88MCG TABLET 3 DAYS A WEEK)  Dispense: 48 tablet; Refill: 2             Follow Up     Return in about 1 year (around 3/14/2025).    Thyroid labs stable, continue current t4 dosing regimen     Patient was given instructions and counseling regarding her condition or for health maintenance advice. Please see specific information pulled into the AVS if appropriate.     EMI Arteaga

## 2024-03-18 ENCOUNTER — PRIOR AUTHORIZATION (OUTPATIENT)
Dept: ENDOCRINOLOGY | Age: 74
End: 2024-03-18
Payer: MEDICARE

## 2024-03-18 NOTE — TELEPHONE ENCOUNTER
PA submitted to plan for Syntghroid  88mcg tablets     (Key: [id number])  Rx #: [id number]      The member recently filled this medication and will be able to return for their next refill according to their plan limits. If dosage has changed, contact the pharmacy to submit the appropriate submission clarification codes .

## 2024-03-22 RX ORDER — ATORVASTATIN CALCIUM 10 MG/1
10 TABLET, FILM COATED ORAL DAILY
Qty: 90 TABLET | Refills: 3 | Status: SHIPPED | OUTPATIENT
Start: 2024-03-22

## 2024-03-22 NOTE — TELEPHONE ENCOUNTER
Caller: Onelia Alegre    Relationship: Self    Best call back number: [phone]    Requested Prescriptions:   Requested Prescriptions     Pending Prescriptions Disp Refills    atorvastatin (LIPITOR) 10 MG tablet 90 tablet 3     Sig: Take 1 tablet by mouth Daily. For cholesterol        Pharmacy where request should be sent: [facility], [address] [phone] [facility] [phone] FX     Last office visit with prescribing clinician: 2/7/2024   Last telemedicine visit with prescribing clinician: Visit date not found   Next office visit with prescribing clinician: 8/5/2024     Brian Ramos Rep   03/22/24 10:59 EDT

## 2024-04-10 RX ORDER — HYDROCHLOROTHIAZIDE 25 MG/1
25 TABLET ORAL DAILY
Qty: 90 TABLET | Refills: 1 | Status: SHIPPED | OUTPATIENT
Start: 2024-04-10

## 2024-04-10 RX ORDER — ALENDRONATE SODIUM 70 MG/1
70 TABLET ORAL
Qty: 13 TABLET | Refills: 3 | Status: SHIPPED | OUTPATIENT
Start: 2024-04-10

## 2024-04-10 NOTE — TELEPHONE ENCOUNTER
Caller: Codey Onelia LELO    Relationship: Self    Best call back number: [phone]     Requested Prescriptions:   Requested Prescriptions     Pending Prescriptions Disp Refills    alendronate (Fosamax) 70 MG tablet 13 tablet 3     Sig: Take 1 tablet by mouth Every 7 (Seven) Days.    hydroCHLOROthiazide 25 MG tablet 90 tablet 1     Sig: Take 1 tablet by mouth Daily. For BP        Pharmacy where request should be sent: [facility], [address] [phone] [facility] [phone] FX     Last office visit with prescribing clinician: 2/7/2024   Last telemedicine visit with prescribing clinician: Visit date not found   Next office visit with prescribing clinician: 8/5/2024       Does the patient have less than a 3 day supply:  [] Yes  [x] No

## 2024-04-25 ENCOUNTER — APPOINTMENT (OUTPATIENT)
Dept: WOMENS IMAGING | Facility: HOSPITAL | Age: 74
End: 2024-04-25
Payer: MEDICARE

## 2024-04-25 PROCEDURE — 77063 BREAST TOMOSYNTHESIS BI: CPT | Performed by: RADIOLOGY

## 2024-04-25 PROCEDURE — 77067 SCR MAMMO BI INCL CAD: CPT | Performed by: RADIOLOGY

## 2024-05-11 RX ORDER — METOPROLOL SUCCINATE 50 MG/1
50 TABLET, EXTENDED RELEASE ORAL DAILY
Qty: 90 TABLET | Refills: 1 | Status: SHIPPED | OUTPATIENT
Start: 2024-05-11

## 2024-07-05 RX ORDER — VALSARTAN 160 MG/1
TABLET ORAL
Qty: 90 TABLET | Refills: 0 | Status: SHIPPED | OUTPATIENT
Start: 2024-07-05

## 2024-07-05 NOTE — TELEPHONE ENCOUNTER
Caller: Codey Onelia LELO    Relationship: Self    Best call back number: [phone]     Requested Prescriptions:   Requested Prescriptions     Pending Prescriptions Disp Refills    valsartan (DIOVAN) 160 MG tablet 90 tablet 1     Sig: TAKE 1 TABLET EVERY DAY FOR BLOOD PRESSURE        Pharmacy where request should be sent: [facility], [address] [phone] [facility] [phone] FX     Last office visit with prescribing clinician: 2/7/2024   Last telemedicine visit with prescribing clinician: Visit date not found   Next office visit with prescribing clinician: 8/5/2024     Additional details provided by patient: PATIENT WILL RUN OUT IN 1 WEEK    Brian Bailey   07/05/24 09:05 EDT

## 2024-07-31 NOTE — PROGRESS NOTES
Answers submitted by the patient for this visit:  Other (Submitted on 7/29/2024)  Please describe your symptoms.: Regular Check up - blood pressure, etc.  Have you had these symptoms before?: Yes  How long have you been having these symptoms?: Greater than 2 weeks  Please list any medications you are currently taking for this condition.: Valsartan, Hydrochlorothiazide, Metoprolol  Please describe any probable cause for these symptoms. : High blood pressure & heart palpitations  Primary Reason for Visit (Submitted on 7/29/2024)  What is the primary reason for your visit?: Other

## 2024-07-31 NOTE — PROGRESS NOTES
"Subjective   Onelia Alegre is a 73 y.o. female.     History of Present Illness    Since the last visit, she has overall felt well.  She has Primary Hypertension and well controlled on current medication, Impaired fasting glucose and will monitor labs to watch for DMII, Hyperlipidemia with goals met with current Rx, Hypothyroidism and remains under the care of their specialist, and Vitamin D deficiency and labs are at goal >30 ng/mL.  she has been compliant with current medications have reviewed them.  The patient denies medication side effects.  Will refill medications. /78 (BP Location: Left arm, Patient Position: Sitting, Cuff Size: Large Adult)   Pulse 65   Temp 97.6 °F (36.4 °C) (Oral)   Resp 16   Ht 165.1 cm (65\")   Wt 73.4 kg (161 lb 12.8 oz)   LMP  (LMP Unknown)   SpO2 95%   BMI 26.92 kg/m² .          Results for orders placed or performed in visit on 03/07/24   T4, Free    Specimen: Blood   Result Value Ref Range    Free T4 2.11 (H) 0.93 - 1.70 ng/dL   TSH    Specimen: Blood   Result Value Ref Range    TSH 1.750 0.270 - 4.200 uIU/mL     Lab Results   Component Value Date    HGBA1C 5.30 02/02/2024     Lab Results   Component Value Date    CHLPL 148 02/02/2024    TRIG 169 (H) 02/02/2024    HDL 54 02/02/2024    LDL 66 02/02/2024     Lab Results   Component Value Date    GLUCOSE 85 02/02/2024    BUN 20 02/02/2024    CREATININE 0.88 02/02/2024    EGFRRESULT 68.6 02/02/2024    BCR 22.7 02/02/2024    K 4.2 02/02/2024    CO2 28.5 02/02/2024    CALCIUM 9.8 02/02/2024    PROTENTOTREF 6.6 02/02/2024    ALBUMIN 4.6 02/02/2024    BILITOT 0.8 02/02/2024    AST 16 02/02/2024    ALT 15 02/02/2024     Saw ortho Dr Angel 5-30-24--left hand pain  Saw [facility] 3-14-24 labs done and cont Levothyroxine--f/u 1 yr  Walking for exercise  Performed by: Brendon Mendoza MD  Authorized by: Brendon Mendoza MD   Comparison: compared with previous ECG from 1/12/2021  Similar to previous ECG  Rhythm: " sinus tachycardia  Conduction: left anterior fascicular block  Other findings: poor R wave progression  Had neg stress treadmill, echo, Holter 2-2021  Neg stress treadmill 1-27-21  DR Mendoza has f/u PRN  Had pre op EKG 1-31-23   Some walking  Tolerates Fosamax.  Started 5-18-23 for Osteopenia   Colonoscopy DR Mcclendon 6-8-23--polyps---3 yr f/u  Saw Dr. Pemberton for hematology consult for leukocytosis and neutropenia on 2/10/2021 and work-up was negative follow-up as needed  Sees DR Cortés-----derm basal cell cancer    Last DEXA 4-24-23   IMPRESSION:  Osteopenia with T score -1.4 the left femoral neck  FRAX:  10 year fracture risk: Major osteoporotic fracture, 10%; hip fracture,  1.7%.  Fracture probability calculated for an untreated patient.  The following portions of the patient's history were reviewed and updated as appropriate: allergies, current medications, past family history, past medical history, past social history, past surgical history, and problem list.    Review of Systems   Constitutional:  Negative for fever.   HENT:  Negative for nosebleeds and trouble swallowing.    Eyes:  Negative for visual disturbance.   Respiratory:  Negative for choking and stridor.    Cardiovascular:  Negative for chest pain.   Gastrointestinal:  Negative for blood in stool.   Endocrine: Negative for polydipsia.   Genitourinary:  Negative for genital sores and hematuria.   Musculoskeletal:  Negative for joint swelling.   Skin:  Negative for color change and rash.   Allergic/Immunologic: Negative for immunocompromised state.   Neurological:  Negative for seizures, facial asymmetry and speech difficulty.   Hematological:  Negative for adenopathy.   Psychiatric/Behavioral:  Negative for behavioral problems, self-injury and suicidal ideas.        Objective   Physical Exam  Vitals and nursing note reviewed.   Constitutional:       General: She is not in acute distress.     Appearance: Normal appearance. She is well-developed. She is  not ill-appearing or toxic-appearing.   HENT:      Head: Normocephalic.      Right Ear: External ear normal.      Left Ear: External ear normal.      Nose: Nose normal.      Mouth/Throat:      Pharynx: Oropharynx is clear.   Eyes:      General: No scleral icterus.     Conjunctiva/sclera: Conjunctivae normal.      Pupils: Pupils are equal, round, and reactive to light.   Neck:      Thyroid: No thyromegaly.      Vascular: No carotid bruit.   Cardiovascular:      Rate and Rhythm: Normal rate and regular rhythm.      Heart sounds: Normal heart sounds. No murmur heard.  Pulmonary:      Effort: Pulmonary effort is normal. No respiratory distress.      Breath sounds: Normal breath sounds. No rales.   Musculoskeletal:         General: No deformity. Normal range of motion.      Cervical back: Normal range of motion and neck supple.      Right lower leg: No edema.      Left lower leg: No edema.   Skin:     General: Skin is warm and dry.      Findings: No rash.   Neurological:      General: No focal deficit present.      Mental Status: She is alert and oriented to person, place, and time. Mental status is at baseline.   Psychiatric:         Mood and Affect: Mood normal.         Behavior: Behavior normal.         Thought Content: Thought content normal.         Judgment: Judgment normal.         Assessment & Plan   Diagnoses and all orders for this visit:    1. Benign essential hypertension (Primary)    2. Osteopenia of multiple sites    3. Vitamin D deficiency    4. Acquired hypothyroidism  Comments:  sees endocrinology    5. Impaired fasting glucose     6. Mixed hyperlipidemia  Assessment & Plan:             7. Encounter for screening for vascular disease  -     Vascular Screening (Carotid) CAR        No palpitations on Toprol  Plan, Onelia Alegre, was seen today.  she was seen for HTN and continue medication, Imparied fasting glucose and plan follow up labs, diet, and exercise, Hyperlipidemia and will continue current  medication, Hypothyroidism and under the care of specialist, and Vitamin D deficiency and supplemented.    Carotid artery vascular screening  Sees endocrinology for hypothyroid management  Continue generic Fosamax for osteopenia tolerating well DEXA due in 2025  On B12 2 times a week  On Vit D  Consider RSV vac    Impaired fasting glucose     Mixed hyperlipidemia          Sees endocrinologist             Answers submitted by the patient for this visit:  Other (Submitted on 7/29/2024)  Please describe your symptoms.: Regular Check up - blood pressure, etc.  Have you had these symptoms before?: Yes  How long have you been having these symptoms?: Greater than 2 weeks  Please list any medications you are currently taking for this condition.: Valsartan, Hydrochlorothiazide, Metoprolol  Please describe any probable cause for these symptoms. : High blood pressure & heart palpitations  Primary Reason for Visit (Submitted on 7/29/2024)  What is the primary reason for your visit?: Other  f

## 2024-08-04 PROBLEM — C44.91 BASAL CELL CARCINOMA OF SKIN: Status: ACTIVE | Noted: 2024-08-04

## 2024-08-05 ENCOUNTER — OFFICE VISIT (OUTPATIENT)
Dept: FAMILY MEDICINE CLINIC | Facility: CLINIC | Age: 74
End: 2024-08-05
Payer: MEDICARE

## 2024-08-05 VITALS
BODY MASS INDEX: 26.96 KG/M2 | WEIGHT: 161.8 LBS | DIASTOLIC BLOOD PRESSURE: 72 MMHG | SYSTOLIC BLOOD PRESSURE: 124 MMHG | HEART RATE: 65 BPM | OXYGEN SATURATION: 95 % | TEMPERATURE: 97.6 F | HEIGHT: 65 IN | RESPIRATION RATE: 16 BRPM

## 2024-08-05 DIAGNOSIS — M85.89 OSTEOPENIA OF MULTIPLE SITES: Chronic | ICD-10-CM

## 2024-08-05 DIAGNOSIS — E55.9 VITAMIN D DEFICIENCY: Chronic | ICD-10-CM

## 2024-08-05 DIAGNOSIS — I10 BENIGN ESSENTIAL HYPERTENSION: Primary | Chronic | ICD-10-CM

## 2024-08-05 DIAGNOSIS — R73.01 IMPAIRED FASTING GLUCOSE: Chronic | ICD-10-CM

## 2024-08-05 DIAGNOSIS — E78.2 MIXED HYPERLIPIDEMIA: Chronic | ICD-10-CM

## 2024-08-05 DIAGNOSIS — Z13.6 ENCOUNTER FOR SCREENING FOR VASCULAR DISEASE: ICD-10-CM

## 2024-08-05 DIAGNOSIS — E03.9 ACQUIRED HYPOTHYROIDISM: Chronic | ICD-10-CM

## 2024-08-05 PROCEDURE — 1160F RVW MEDS BY RX/DR IN RCRD: CPT | Performed by: PHYSICIAN ASSISTANT

## 2024-08-05 PROCEDURE — 3074F SYST BP LT 130 MM HG: CPT | Performed by: PHYSICIAN ASSISTANT

## 2024-08-05 PROCEDURE — 3078F DIAST BP <80 MM HG: CPT | Performed by: PHYSICIAN ASSISTANT

## 2024-08-05 PROCEDURE — G2211 COMPLEX E/M VISIT ADD ON: HCPCS | Performed by: PHYSICIAN ASSISTANT

## 2024-08-05 PROCEDURE — 1159F MED LIST DOCD IN RCRD: CPT | Performed by: PHYSICIAN ASSISTANT

## 2024-08-05 PROCEDURE — 99214 OFFICE O/P EST MOD 30 MIN: CPT | Performed by: PHYSICIAN ASSISTANT

## 2024-08-05 PROCEDURE — 1126F AMNT PAIN NOTED NONE PRSNT: CPT | Performed by: PHYSICIAN ASSISTANT

## 2024-08-05 RX ORDER — METOPROLOL SUCCINATE 50 MG/1
50 TABLET, EXTENDED RELEASE ORAL DAILY
Qty: 90 TABLET | Refills: 1 | Status: SHIPPED | OUTPATIENT
Start: 2024-08-05

## 2024-08-05 RX ORDER — HYDROCHLOROTHIAZIDE 25 MG/1
25 TABLET ORAL DAILY
Qty: 90 TABLET | Refills: 1 | Status: SHIPPED | OUTPATIENT
Start: 2024-08-05

## 2024-08-05 RX ORDER — VALSARTAN 160 MG/1
TABLET ORAL
Qty: 90 TABLET | Refills: 1 | Status: SHIPPED | OUTPATIENT
Start: 2024-08-05

## 2024-08-05 NOTE — PATIENT INSTRUCTIONS
Exercising to Lose Weight  Getting regular exercise is important for everyone. It is especially important if you are overweight. Being overweight increases your risk of heart disease, stroke, diabetes, high blood pressure, and several types of cancer. Exercising, and reducing the calories you consume, can help you lose weight and improve fitness and health.  Exercise can be moderate or vigorous intensity. To lose weight, most people need to do a certain amount of moderate or vigorous-intensity exercise each week.  How can exercise affect me?  You lose weight when you exercise enough to burn more calories than you eat. Exercise also reduces body fat and builds muscle. The more muscle you have, the more calories you burn. Exercise also:  Improves mood.  Reduces stress and tension.  Improves your overall fitness, flexibility, and endurance.  Increases bone strength.  Moderate-intensity exercise    Moderate-intensity exercise is any activity that gets you moving enough to burn at least three times more energy (calories) than if you were sitting.  Examples of moderate exercise include:  Walking a mile in 15 minutes.  Doing light yard work.  Biking at an easy pace.  Most people should get at least 150 minutes of moderate-intensity exercise a week to maintain their body weight.  Vigorous-intensity exercise  Vigorous-intensity exercise is any activity that gets you moving enough to burn at least six times more calories than if you were sitting. When you exercise at this intensity, you should be working hard enough that you are not able to carry on a conversation.  Examples of vigorous exercise include:  Running.  Playing a team sport, such as football, basketball, and soccer.  Jumping rope.  Most people should get at least 75 minutes a week of vigorous exercise to maintain their body weight.  What actions can I take to lose weight?  The amount of exercise you need to lose weight depends on:  Your age.  The type of  exercise.  Any health conditions you have.  Your overall physical ability.  Talk to your health care provider about how much exercise you need and what types of activities are safe for you.  Nutrition    Make changes to your diet as told by your health care provider or diet and nutrition specialist (dietitian). This may include:  Eating fewer calories.  Eating more protein.  Eating less unhealthy fats.  Eating a diet that includes fresh fruits and vegetables, whole grains, low-fat dairy products, and lean protein.  Avoiding foods with added fat, salt, and sugar.  Drink plenty of water while you exercise to prevent dehydration or heat stroke.  Activity  Choose an activity that you enjoy and set realistic goals. Your health care provider can help you make an exercise plan that works for you.  Exercise at a moderate or vigorous intensity most days of the week.  The intensity of exercise may vary from person to person. You can tell how intense a workout is for you by paying attention to your breathing and heartbeat. Most people will notice their breathing and heartbeat get faster with more intense exercise.  Do resistance training twice each week, such as:  Push-ups.  Sit-ups.  Lifting weights.  Using resistance bands.  Getting short amounts of exercise can be just as helpful as long, structured periods of exercise. If you have trouble finding time to exercise, try doing these things as part of your daily routine:  Get up, stretch, and walk around every 30 minutes throughout the day.  Go for a walk during your lunch break.  Park your car farther away from your destination.  If you take public transportation, get off one stop early and walk the rest of the way.  Make phone calls while standing up and walking around.  Take the stairs instead of elevators or escalators.  Wear comfortable clothes and shoes with good support.  Do not exercise so much that you hurt yourself, feel dizzy, or get very short of breath.  Where to  find more information  [place] Department of Health and Human Services: www.hhs.gov  Centers for Disease Control and Prevention: www.cdc.gov  Contact a health care provider:  Before starting a new exercise program.  If you have questions or concerns about your weight.  If you have a medical problem that keeps you from exercising.  Get help right away if:  You have any of the following while exercising:  Injury.  Dizziness.  Difficulty breathing or shortness of breath that does not go away when you stop exercising.  Chest pain.  Rapid heartbeat.  These symptoms may represent a serious problem that is an emergency. Do not wait to see if the symptoms will go away. Get medical help right away. Call your local emergency services (911 in the [place] Do not drive yourself to the hospital.  Summary  Getting regular exercise is especially important if you are overweight.  Being overweight increases your risk of heart disease, stroke, diabetes, high blood pressure, and several types of cancer.  Losing weight happens when you burn more calories than you eat.  Reducing the amount of calories you eat, and getting regular moderate or vigorous exercise each week, helps you lose weight.  This information is not intended to replace advice given to you by your health care provider. Make sure you discuss any questions you have with your health care provider.  Document Revised: 02/13/2022 Document Reviewed: 02/13/2022  Elsevier Patient Education © 2024 [employer]

## 2024-09-11 ENCOUNTER — HOSPITAL ENCOUNTER (OUTPATIENT)
Dept: CARDIOLOGY | Facility: HOSPITAL | Age: 74
Discharge: HOME | End: 2024-09-11
Admitting: PHYSICIAN ASSISTANT

## 2024-09-11 LAB
BH CV VAS BP LEFT ARM: NORMAL MMHG
BH CV VAS BP RIGHT ARM: NORMAL MMHG
BH CV VAS SCREENING CAROTID CCA LEFT: NORMAL CM/SEC
BH CV VAS SCREENING CAROTID CCA RIGHT: NORMAL CM/SEC
BH CV VAS SCREENING CAROTID ICA LEFT: NORMAL CM/SEC
BH CV VAS SCREENING CAROTID ICA RIGHT: NORMAL CM/SEC
BH CV XLRA MEAS LEFT ICA/CCA RATIO: 0.98
BH CV XLRA MEAS LEFT PROX ECA PSV: NORMAL CM/SEC
BH CV XLRA MEAS RIGHT ICA/CCA RATIO: 0.92
BH CV XLRA MEAS RIGHT PROX ECA PSV: NORMAL CM/SEC
MAXIMAL PREDICTED HEART RATE: 145 BPM
STRESS TARGET HR: 123 BPM

## 2024-09-11 PROCEDURE — 93799 UNLISTED CV SVC/PROCEDURE: CPT

## 2024-10-22 DIAGNOSIS — E03.9 PRIMARY HYPOTHYROIDISM: ICD-10-CM

## 2024-10-22 RX ORDER — LEVOTHYROXINE SODIUM 100 MCG
TABLET ORAL
Qty: 48 TABLET | Refills: 1 | Status: SHIPPED | OUTPATIENT
Start: 2024-10-22

## 2024-10-22 NOTE — TELEPHONE ENCOUNTER
Rx Refill Note  Requested Prescriptions     Pending Prescriptions Disp Refills    Synthroid 100 MCG tablet 48 tablet 2     Sig: TAKE 1 TABLET (100MCG) 4 DAYS A WEEK (TAKE 88MCG TABLET 3 DAYS A WEEK)      Last office visit with prescribing clinician: 3/14/2024   Last telemedicine visit with prescribing clinician: Visit date not found   Next office visit with prescribing clinician: 3/14/2025                         Would you like a call back once the refill request has been completed: [] Yes [] No    If the office needs to give you a call back, can they leave a voicemail: [] Yes [] No    Lisa Garcias MA  10/22/24, 12:31 EDT

## 2024-11-03 ENCOUNTER — PATIENT MESSAGE (OUTPATIENT)
Dept: FAMILY MEDICINE CLINIC | Facility: CLINIC | Age: 74
End: 2024-11-03
Payer: MEDICARE

## 2024-11-03 DIAGNOSIS — M79.641 RIGHT HAND PAIN: Primary | ICD-10-CM

## 2025-01-19 RX ORDER — ALENDRONATE SODIUM 70 MG/1
70 TABLET ORAL
Qty: 12 TABLET | Refills: 0 | Status: SHIPPED | OUTPATIENT
Start: 2025-01-19

## 2025-01-21 RX ORDER — VALSARTAN 160 MG/1
TABLET ORAL
Qty: 90 TABLET | Refills: 0 | Status: SHIPPED | OUTPATIENT
Start: 2025-01-21 | End: 2025-01-22 | Stop reason: SDUPTHER

## 2025-01-22 ENCOUNTER — TELEPHONE (OUTPATIENT)
Dept: FAMILY MEDICINE CLINIC | Facility: CLINIC | Age: 75
End: 2025-01-22
Payer: MEDICARE

## 2025-01-22 RX ORDER — VALSARTAN 160 MG/1
TABLET ORAL
Qty: 30 TABLET | Refills: 0 | Status: SHIPPED | OUTPATIENT
Start: 2025-01-22

## 2025-01-22 NOTE — TELEPHONE ENCOUNTER
Left patient a voicemail to inform her that her medication was was sent to her pharmacy and to contact our office for further questions or concerns.    HUB TO RELAY

## 2025-01-22 NOTE — TELEPHONE ENCOUNTER
Caller: Onelia Alegre    Relationship: Self    Best call back number:   Telephone Information:   Mobile [phone]     What medication are you requesting: valsartan (DIOVAN) 160 MG tablet     If a prescription is needed, what is your preferred pharmacy and phone number: [id number] - [place], [place] - [address] AT [facility] [phone] [facility] [phone] FX     Additional notes:PATIENT STATED THAT [facility] LOST HER valsartan BUT THEY ARE GETTING IT SENT TO HER AGAIN. IT WILL TAKE ABOUT A WEEK FOR IT TO GET TO HER , BUT SHE HAS ALREADY BEEN OUT OF HER MEDICATION FOR A WEEK. PATIENT IS REQUESTING IF A SHORT TERM SUPPLY CAN BE SENT TO HER LOCAL PHARMACY TO LAST UNTIL THE MAIL DELIVERY REACHES HER.

## 2025-02-05 ENCOUNTER — OFFICE VISIT (OUTPATIENT)
Dept: FAMILY MEDICINE CLINIC | Facility: CLINIC | Age: 75
End: 2025-02-05
Payer: MEDICARE

## 2025-02-05 VITALS
RESPIRATION RATE: 16 BRPM | OXYGEN SATURATION: 100 % | DIASTOLIC BLOOD PRESSURE: 70 MMHG | WEIGHT: 163 LBS | BODY MASS INDEX: 27.16 KG/M2 | SYSTOLIC BLOOD PRESSURE: 128 MMHG | HEART RATE: 53 BPM | HEIGHT: 65 IN | TEMPERATURE: 97 F

## 2025-02-05 DIAGNOSIS — M85.89 OSTEOPENIA OF MULTIPLE SITES: Primary | ICD-10-CM

## 2025-02-05 DIAGNOSIS — Z12.31 ENCOUNTER FOR SCREENING MAMMOGRAM FOR BREAST CANCER: ICD-10-CM

## 2025-02-05 DIAGNOSIS — E78.2 MIXED HYPERLIPIDEMIA: ICD-10-CM

## 2025-02-05 DIAGNOSIS — Z78.0 POST-MENOPAUSAL: ICD-10-CM

## 2025-02-05 DIAGNOSIS — E55.9 VITAMIN D DEFICIENCY: ICD-10-CM

## 2025-02-05 DIAGNOSIS — R73.01 IMPAIRED FASTING GLUCOSE: ICD-10-CM

## 2025-02-05 DIAGNOSIS — E03.9 ACQUIRED HYPOTHYROIDISM: ICD-10-CM

## 2025-02-05 DIAGNOSIS — R00.2 PALPITATIONS: ICD-10-CM

## 2025-02-05 DIAGNOSIS — E03.9 PRIMARY HYPOTHYROIDISM: ICD-10-CM

## 2025-02-05 DIAGNOSIS — E53.8 LOW SERUM VITAMIN B12: ICD-10-CM

## 2025-02-05 PROCEDURE — 3074F SYST BP LT 130 MM HG: CPT | Performed by: PHYSICIAN ASSISTANT

## 2025-02-05 PROCEDURE — G2211 COMPLEX E/M VISIT ADD ON: HCPCS | Performed by: PHYSICIAN ASSISTANT

## 2025-02-05 PROCEDURE — 99214 OFFICE O/P EST MOD 30 MIN: CPT | Performed by: PHYSICIAN ASSISTANT

## 2025-02-05 PROCEDURE — 1126F AMNT PAIN NOTED NONE PRSNT: CPT | Performed by: PHYSICIAN ASSISTANT

## 2025-02-05 PROCEDURE — 1160F RVW MEDS BY RX/DR IN RCRD: CPT | Performed by: PHYSICIAN ASSISTANT

## 2025-02-05 PROCEDURE — 1159F MED LIST DOCD IN RCRD: CPT | Performed by: PHYSICIAN ASSISTANT

## 2025-02-05 PROCEDURE — 3078F DIAST BP <80 MM HG: CPT | Performed by: PHYSICIAN ASSISTANT

## 2025-02-05 RX ORDER — VALSARTAN 160 MG/1
TABLET ORAL
Qty: 90 TABLET | Refills: 1 | Status: SHIPPED | OUTPATIENT
Start: 2025-02-05

## 2025-02-05 RX ORDER — ATORVASTATIN CALCIUM 10 MG/1
10 TABLET, FILM COATED ORAL DAILY
Qty: 90 TABLET | Refills: 3 | Status: SHIPPED | OUTPATIENT
Start: 2025-02-05

## 2025-02-05 RX ORDER — LEVOTHYROXINE SODIUM 88 MCG
TABLET ORAL
Qty: 39 TABLET | Refills: 2 | Status: SHIPPED | OUTPATIENT
Start: 2025-02-05

## 2025-02-05 RX ORDER — HYDROCHLOROTHIAZIDE 25 MG/1
25 TABLET ORAL DAILY
Qty: 90 TABLET | Refills: 1 | Status: SHIPPED | OUTPATIENT
Start: 2025-02-05

## 2025-02-05 RX ORDER — ALENDRONATE SODIUM 70 MG/1
70 TABLET ORAL
Qty: 12 TABLET | Refills: 3 | Status: SHIPPED | OUTPATIENT
Start: 2025-02-05

## 2025-02-05 RX ORDER — METOPROLOL SUCCINATE 50 MG/1
50 TABLET, EXTENDED RELEASE ORAL DAILY
Qty: 90 TABLET | Refills: 1 | Status: SHIPPED | OUTPATIENT
Start: 2025-02-05

## 2025-02-05 RX ORDER — LEVOTHYROXINE SODIUM 100 MCG
TABLET ORAL
Qty: 48 TABLET | Refills: 1 | Status: SHIPPED | OUTPATIENT
Start: 2025-02-05

## 2025-02-05 NOTE — PROGRESS NOTES
"Subjective   Onelia Alegre is a 74 y.o. female.     Hypertension  Pertinent negatives include no blurred vision, chest pain, palpitations or shortness of breath.   Hyperlipidemia  Exacerbating diseases include hypothyroidism. Pertinent negatives include no chest pain or shortness of breath.   Hypothyroidism  Patient reports no diaphoresis, palpitations or trouble swallowing. Her past medical history is significant for hyperlipidemia.       Since the last visit, she has overall felt well.  She has Primary Hypertension and well controlled on current medication, Impaired fasting glucose and will monitor labs to watch for DMII, Hyperlipidemia with goals met with current Rx, Hypothyroidism and remains under the care of their specialist, and Vitamin D deficiency and labs are at goal >30 ng/mL.  she has been compliant with current medications have reviewed them.  The patient denies medication side effects.  Will refill medications. /70   Pulse 53   Temp 97 °F (36.1 °C) (Temporal)   Resp 16   Ht 165.1 cm (65\")   Wt 73.9 kg (163 lb)   LMP  (LMP Unknown)   SpO2 100%   BMI 27.12 kg/m² .      Toprol still works well for palpitation  I plan to update bone density and mammogram in the spring  Walking for exercise  Weight down    Last visit we talked about her left hand pain and referred her to the [facility] and she had hand surgery----DR Evans----had surgery for her metacarpal phalangeal joint of the thumb and will have surgery and right hand and include joint replacement and 2 of her fingers in future.  Results for orders placed or performed in visit on 08/05/24   Vascular Screening (Carotid) CAR    Collection Time: 09/11/24  1:37 PM   Result Value Ref Range    BH CV VAS BP RIGHT /72 mmHg    BH CV VAS BP LEFT /61 mmHg    Target HR (85%) 123 bpm    Max. Pred. HR (100%) 145 bpm    Screening Right CCA  76/16 cm/sec    Screening Left CCA 87/24 cm/sec    Screening Right ICA " 70/14 cm/sec    Screening Left ICA 85/23 cm/sec    Prox ECA PSV 88/8 cm/sec    Prox ECA PSV 99/9 cm/sec    ICA/CCA ratio 0.92     ICA/CCA ratio 0.98      Walking for exercise    1-27-21  Performed by: Brendon Mendoza MD  Authorized by: Brendon Mendoza MD   Comparison: compared with previous ECG from 1/12/2021  Similar to previous ECG  Rhythm: sinus tachycardia  Conduction: left anterior fascicular block  Other findings: poor R wave progression  Had neg stress treadmill, echo, Holter 2-2021  Neg stress treadmill 1-27-21  DR Mendoza has f/u PRN  Had pre op EKG 1-31-23   Some walking    Tolerates Fosamax.  Started 5-18-23 for Osteopenia   Colonoscopy DR Mcclendon 6-8-23--polyps---3 yr f/u    Saw Dr. Pemberton for hematology consult for leukocytosis and neutropenia on 2/10/2021 and work-up was negative follow-up as needed    Sees DR Cortés-----derm basal cell cancer   had EKG----12-20-24---getting copy  Last DEXA 4-24-23   IMPRESSION:  Osteopenia with T score -1.4 the left femoral neck  FRAX:  10 year fracture risk: Major osteoporotic fracture, 10%; hip fracture,  1.7%.  Her carotid Doppler screening was negative no plaque on 9/11/2024  On B12 twice week and D  The following portions of the patient's history were reviewed and updated as appropriate: allergies, current medications, past family history, past medical history, past social history, past surgical history, and problem list.    Review of Systems   Constitutional:  Negative for diaphoresis.   HENT:  Negative for nosebleeds and trouble swallowing.    Eyes:  Negative for blurred vision and visual disturbance.   Respiratory:  Negative for choking and shortness of breath.    Cardiovascular:  Negative for chest pain and palpitations.   Gastrointestinal:  Negative for blood in stool.   Allergic/Immunologic: Negative for immunocompromised state.   Neurological:  Negative for facial asymmetry and speech difficulty.   Psychiatric/Behavioral:  Negative for self-injury  and suicidal ideas.        Objective   Physical Exam  Vitals and nursing note reviewed.   Constitutional:       General: She is not in acute distress.     Appearance: Normal appearance. She is well-developed. She is not ill-appearing or toxic-appearing.   HENT:      Head: Normocephalic.      Right Ear: External ear normal.      Left Ear: External ear normal.      Nose: Nose normal.      Mouth/Throat:      Pharynx: Oropharynx is clear.   Eyes:      General: No scleral icterus.     Conjunctiva/sclera: Conjunctivae normal.      Pupils: Pupils are equal, round, and reactive to light.   Neck:      Thyroid: No thyromegaly.      Vascular: No carotid bruit.   Cardiovascular:      Rate and Rhythm: Normal rate and regular rhythm.      Heart sounds: Normal heart sounds. No murmur heard.  Pulmonary:      Effort: Pulmonary effort is normal. No respiratory distress.      Breath sounds: Normal breath sounds. No rales.   Musculoskeletal:         General: No deformity. Normal range of motion.      Cervical back: Normal range of motion and neck supple.      Right lower leg: No edema.      Left lower leg: No edema.   Skin:     General: Skin is warm and dry.      Findings: No rash.   Neurological:      General: No focal deficit present.      Mental Status: She is alert and oriented to person, place, and time. Mental status is at baseline.   Psychiatric:         Mood and Affect: Mood normal.         Behavior: Behavior normal.         Thought Content: Thought content normal.         Judgment: Judgment normal.           Assessment & Plan   Diagnoses and all orders for this visit:    1. Osteopenia of multiple sites (Primary)  -     Magnesium  -     Urinalysis With Microscopic - Urine, Clean Catch  -     T4, Free  -     Vitamin D,25-Hydroxy  -     CBC & Differential  -     TSH  -     Comprehensive Metabolic Panel  -     Lipid Panel  -     Hemoglobin A1c  -     Vitamin B12 & Folate  -     T3, Free    2. Primary hypothyroidism  -      Synthroid 100 MCG tablet; TAKE 1 TABLET (100MCG) 4 DAYS A WEEK (TAKE 88MCG TABLET 3 DAYS A WEEK)  Dispense: 48 tablet; Refill: 1  -     Synthroid 88 MCG tablet; TAKE 1 TABLET 3 DAYS A WEEK AND TAKE A 100MCG TABLET 4 DAYS A WEEK  Dispense: 39 tablet; Refill: 2    3. Vitamin D deficiency  -     Magnesium  -     Urinalysis With Microscopic - Urine, Clean Catch  -     T4, Free  -     Vitamin D,25-Hydroxy  -     CBC & Differential  -     TSH  -     Comprehensive Metabolic Panel  -     Lipid Panel  -     Hemoglobin A1c  -     Vitamin B12 & Folate  -     T3, Free    4. Acquired hypothyroidism  -     Magnesium  -     Urinalysis With Microscopic - Urine, Clean Catch  -     T4, Free  -     Vitamin D,25-Hydroxy  -     CBC & Differential  -     TSH  -     Comprehensive Metabolic Panel  -     Lipid Panel  -     Hemoglobin A1c  -     Vitamin B12 & Folate  -     T3, Free    5. Impaired fasting glucose  -     Magnesium  -     Urinalysis With Microscopic - Urine, Clean Catch  -     T4, Free  -     Vitamin D,25-Hydroxy  -     CBC & Differential  -     TSH  -     Comprehensive Metabolic Panel  -     Lipid Panel  -     Hemoglobin A1c  -     Vitamin B12 & Folate  -     T3, Free    6. Mixed hyperlipidemia  -     Magnesium  -     Urinalysis With Microscopic - Urine, Clean Catch  -     T4, Free  -     Vitamin D,25-Hydroxy  -     CBC & Differential  -     TSH  -     Comprehensive Metabolic Panel  -     Lipid Panel  -     Hemoglobin A1c  -     Vitamin B12 & Folate  -     T3, Free    7. Low serum vitamin B12  -     Magnesium  -     Urinalysis With Microscopic - Urine, Clean Catch  -     T4, Free  -     Vitamin D,25-Hydroxy  -     CBC & Differential  -     TSH  -     Comprehensive Metabolic Panel  -     Lipid Panel  -     Hemoglobin A1c  -     Vitamin B12 & Folate  -     T3, Free    8. Palpitations  -     Magnesium  -     Urinalysis With Microscopic - Urine, Clean Catch  -     T4, Free  -     Vitamin D,25-Hydroxy  -     CBC & Differential  -      TSH  -     Comprehensive Metabolic Panel  -     Lipid Panel  -     Hemoglobin A1c  -     Vitamin B12 & Folate  -     T3, Free    9. Encounter for screening mammogram for breast cancer  -     Mammo Screening Digital Tomosynthesis Bilateral With CAD; Future    10. Post-menopausal  -     DEXA Bone Density Axial; Future    Other orders  -     alendronate (FOSAMAX) 70 MG tablet; Take 1 tablet by mouth Every 7 (Seven) Days.  Dispense: 12 tablet; Refill: 3  -     atorvastatin (LIPITOR) 10 MG tablet; Take 1 tablet by mouth Daily. For cholesterol  Dispense: 90 tablet; Refill: 3  -     hydroCHLOROthiazide 25 MG tablet; Take 1 tablet by mouth Daily. For BP  Dispense: 90 tablet; Refill: 1  -     metoprolol succinate XL (TOPROL-XL) 50 MG 24 hr tablet; Take 1 tablet by mouth Daily. For blood pressure and palpitations  Dispense: 90 tablet; Refill: 1  -     valsartan (DIOVAN) 160 MG tablet; TAKE ONE TABLET BY MOUTH EVERY DAY FOR BLOOD PRESSURE  Dispense: 90 tablet; Refill: 1      Continue generic Fosamax for osteopenia tolerating well DEXA due in 2025  ---Started 5-18-23 for Osteopenia   She needs the Synthroid updated before she sees endocrinology next month  Continues on metoprolol for palpitations and works well  Plan to update labs and include labs for the endocrinology appointment  History of basal cell skin cancer and sees Dr. Cortés dermatology  Has follow-up with hand surgeon---DR Nathan Taylor, Onelia Alegre, was seen today.  she was seen for HTN and continue medication, Imparied fasting glucose and plan follow up labs, diet, and exercise, Hyperlipidemia and will continue current medication, Hypothyroidism and under the care of specialist, and Vitamin D deficiency and will update labs .

## 2025-02-08 LAB
25(OH)D3+25(OH)D2 SERPL-MCNC: 68.3 NG/ML (ref 30–100)
ALBUMIN SERPL-MCNC: 4.3 G/DL (ref 3.5–5.2)
ALBUMIN/GLOB SERPL: 1.9 G/DL
ALP SERPL-CCNC: 68 U/L (ref 39–117)
ALT SERPL-CCNC: 12 U/L (ref 1–33)
APPEARANCE UR: CLEAR
AST SERPL-CCNC: 17 U/L (ref 1–32)
BACTERIA #/AREA URNS HPF: NORMAL /HPF
BASOPHILS # BLD AUTO: 0.08 10*3/MM3 (ref 0–0.2)
BASOPHILS NFR BLD AUTO: 1.1 % (ref 0–1.5)
BILIRUB SERPL-MCNC: 0.9 MG/DL (ref 0–1.2)
BILIRUB UR QL STRIP: NEGATIVE
BUN SERPL-MCNC: 23 MG/DL (ref 8–23)
BUN/CREAT SERPL: 24 (ref 7–25)
CALCIUM SERPL-MCNC: 10 MG/DL (ref 8.6–10.5)
CASTS URNS MICRO: NORMAL
CHLORIDE SERPL-SCNC: 102 MMOL/L (ref 98–107)
CHOLEST SERPL-MCNC: 142 MG/DL (ref 0–200)
CO2 SERPL-SCNC: 29.2 MMOL/L (ref 22–29)
COLOR UR: YELLOW
CREAT SERPL-MCNC: 0.96 MG/DL (ref 0.57–1)
EGFRCR SERPLBLD CKD-EPI 2021: 61.4 ML/MIN/1.73
EOSINOPHIL # BLD AUTO: 0.34 10*3/MM3 (ref 0–0.4)
EOSINOPHIL NFR BLD AUTO: 4.6 % (ref 0.3–6.2)
EPI CELLS #/AREA URNS HPF: NORMAL /HPF
ERYTHROCYTE [DISTWIDTH] IN BLOOD BY AUTOMATED COUNT: 12 % (ref 12.3–15.4)
FOLATE SERPL-MCNC: >20 NG/ML (ref 4.78–24.2)
GLOBULIN SER CALC-MCNC: 2.3 GM/DL
GLUCOSE SERPL-MCNC: 89 MG/DL (ref 65–99)
GLUCOSE UR QL STRIP: NEGATIVE
HBA1C MFR BLD: 5.3 % (ref 4.8–5.6)
HCT VFR BLD AUTO: 37.8 % (ref 34–46.6)
HDLC SERPL-MCNC: 54 MG/DL (ref 40–60)
HGB BLD-MCNC: 13 G/DL (ref 12–15.9)
HGB UR QL STRIP: NEGATIVE
IMM GRANULOCYTES # BLD AUTO: 0.01 10*3/MM3 (ref 0–0.05)
IMM GRANULOCYTES NFR BLD AUTO: 0.1 % (ref 0–0.5)
KETONES UR QL STRIP: NEGATIVE
LDLC SERPL CALC-MCNC: 66 MG/DL (ref 0–100)
LEUKOCYTE ESTERASE UR QL STRIP: NEGATIVE
LYMPHOCYTES # BLD AUTO: 2.18 10*3/MM3 (ref 0.7–3.1)
LYMPHOCYTES NFR BLD AUTO: 29.5 % (ref 19.6–45.3)
MAGNESIUM SERPL-MCNC: 2 MG/DL (ref 1.6–2.4)
MCH RBC QN AUTO: 33.9 PG (ref 26.6–33)
MCHC RBC AUTO-ENTMCNC: 34.4 G/DL (ref 31.5–35.7)
MCV RBC AUTO: 98.4 FL (ref 79–97)
MONOCYTES # BLD AUTO: 0.46 10*3/MM3 (ref 0.1–0.9)
MONOCYTES NFR BLD AUTO: 6.2 % (ref 5–12)
NEUTROPHILS # BLD AUTO: 4.33 10*3/MM3 (ref 1.7–7)
NEUTROPHILS NFR BLD AUTO: 58.5 % (ref 42.7–76)
NITRITE UR QL STRIP: NEGATIVE
NRBC BLD AUTO-RTO: 0 /100 WBC (ref 0–0.2)
PH UR STRIP: 7 [PH] (ref 5–8)
PLATELET # BLD AUTO: 236 10*3/MM3 (ref 140–450)
POTASSIUM SERPL-SCNC: 4.3 MMOL/L (ref 3.5–5.2)
PROT SERPL-MCNC: 6.6 G/DL (ref 6–8.5)
PROT UR QL STRIP: NEGATIVE
RBC # BLD AUTO: 3.84 10*6/MM3 (ref 3.77–5.28)
RBC #/AREA URNS HPF: NORMAL /HPF
SODIUM SERPL-SCNC: 141 MMOL/L (ref 136–145)
SP GR UR STRIP: 1.02 (ref 1–1.03)
T3FREE SERPL-MCNC: 3.3 PG/ML (ref 2–4.4)
T4 FREE SERPL-MCNC: 1.78 NG/DL (ref 0.92–1.68)
TRIGL SERPL-MCNC: 124 MG/DL (ref 0–150)
TSH SERPL DL<=0.005 MIU/L-ACNC: 1.71 UIU/ML (ref 0.27–4.2)
UROBILINOGEN UR STRIP-MCNC: NORMAL MG/DL
VIT B12 SERPL-MCNC: 704 PG/ML (ref 211–946)
VLDLC SERPL CALC-MCNC: 22 MG/DL (ref 5–40)
WBC # BLD AUTO: 7.4 10*3/MM3 (ref 3.4–10.8)
WBC #/AREA URNS HPF: NORMAL /HPF

## 2025-03-14 ENCOUNTER — OFFICE VISIT (OUTPATIENT)
Dept: ENDOCRINOLOGY | Age: 75
End: 2025-03-14
Payer: MEDICARE

## 2025-03-14 VITALS
DIASTOLIC BLOOD PRESSURE: 78 MMHG | OXYGEN SATURATION: 98 % | BODY MASS INDEX: 27.29 KG/M2 | SYSTOLIC BLOOD PRESSURE: 124 MMHG | HEIGHT: 65 IN | TEMPERATURE: 97.6 F | WEIGHT: 163.8 LBS | HEART RATE: 74 BPM

## 2025-03-14 DIAGNOSIS — E03.9 PRIMARY HYPOTHYROIDISM: ICD-10-CM

## 2025-03-14 PROCEDURE — 3078F DIAST BP <80 MM HG: CPT | Performed by: NURSE PRACTITIONER

## 2025-03-14 PROCEDURE — 3074F SYST BP LT 130 MM HG: CPT | Performed by: NURSE PRACTITIONER

## 2025-03-14 PROCEDURE — 99213 OFFICE O/P EST LOW 20 MIN: CPT | Performed by: NURSE PRACTITIONER

## 2025-03-14 RX ORDER — LEVOTHYROXINE SODIUM 88 MCG
TABLET ORAL
Qty: 36 TABLET | Refills: 2 | Status: SHIPPED | OUTPATIENT
Start: 2025-03-14

## 2025-03-14 RX ORDER — LEVOTHYROXINE SODIUM 100 MCG
TABLET ORAL
Qty: 48 TABLET | Refills: 2 | Status: SHIPPED | OUTPATIENT
Start: 2025-03-14

## 2025-03-14 NOTE — PROGRESS NOTES
"Chief Complaint  Hypothyroidism    Subjective        Onelia Alegre presents to [facility] ENDOCRINOLOGY  History of Present Illness    Hypothyroidism, referred to Dr. Farooq 4/2017 for ABN thyroid function  3/2023: Change Synthroid to 100 mcg 4 days a week and 88 mcg 3 days a week   Tolerating this dosing without complaints or concerns  Sleeps and energy levels are good- she watches her 1 year old great grandson without issues  Denies CV concerns, controlled on BB  Normal bowel movements and digestion  Normal body temperature regulation  No hair skin or nail concerns      Objective   Vital Signs:  /78   Pulse 74   Temp 97.6 °F (36.4 °C) (Oral)   Ht 165.1 cm (65\")   Wt 74.3 kg (163 lb 12.8 oz)   SpO2 98%   BMI 27.26 kg/m²   Estimated body mass index is 27.26 kg/m² as calculated from the following:    Height as of this encounter: 165.1 cm (65\").    Weight as of this encounter: 74.3 kg (163 lb 12.8 oz).         Physical Exam  Vitals reviewed.   Constitutional:       General: She is not in acute distress.  HENT:      Head: Normocephalic and atraumatic.   Cardiovascular:      Rate and Rhythm: Normal rate.   Pulmonary:      Effort: Pulmonary effort is normal. No respiratory distress.   Musculoskeletal:         General: No signs of injury. Normal range of motion.      Cervical back: Normal range of motion and neck supple.   Skin:     General: Skin is warm and dry.   Neurological:      Mental Status: She is alert and oriented to person, place, and time. Mental status is at baseline.   Psychiatric:         Mood and Affect: Mood normal.         Behavior: Behavior normal.         Thought Content: Thought content normal.         Judgment: Judgment normal.        Result Review :  The following data was reviewed by: EMI Arteaga on 03/14/2025:  Common labs          12/19/2024    14:17 2/7/2025    09:32   Common Labs   Glucose  89    BUN  23    Creatinine  0.96    Sodium  141    Potassium " 4.3     4.3    Chloride  102    Calcium  10.0    Albumin  4.3    Total Bilirubin  0.9    Alkaline Phosphatase  68    AST (SGOT)  17    ALT (SGPT)  12    WBC  7.40    Hemoglobin  13.0    Hematocrit  37.8    Platelets  236    Total Cholesterol  142    Triglycerides  124    HDL Cholesterol  54    LDL Cholesterol   66    Hemoglobin A1C  5.30       Details          This result is from an external source.                       Assessment and Plan   Diagnoses and all orders for this visit:    1. Primary hypothyroidism  -     Synthroid 100 MCG tablet; TAKE 1 TABLET (100MCG) 4 DAYS A WEEK (TAKE 88MCG TABLET 3 DAYS A WEEK)  Dispense: 48 tablet; Refill: 2  -     Synthroid 88 MCG tablet; TAKE 1 TABLET 3 DAYS A WEEK AND TAKE A 100MCG TABLET 4 DAYS A WEEK  Dispense: 36 tablet; Refill: 2             Follow Up   Return in about 1 year (around 3/14/2026).    Labs unremarkable  Asymptomatic  Continue t4 dosing as above     Patient was given instructions and counseling regarding her condition or for health maintenance advice. Please see specific information pulled into the AVS if appropriate.       EMI Arteaga

## 2025-05-09 ENCOUNTER — APPOINTMENT (OUTPATIENT)
Dept: WOMENS IMAGING | Facility: HOSPITAL | Age: 75
End: 2025-05-09
Payer: MEDICARE

## 2025-05-09 ENCOUNTER — HOSPITAL ENCOUNTER (OUTPATIENT)
Dept: PET IMAGING | Facility: HOSPITAL | Age: 75
Discharge: HOME | End: 2025-05-09
Payer: MEDICARE

## 2025-05-09 DIAGNOSIS — Z78.0 POST-MENOPAUSAL: ICD-10-CM

## 2025-05-09 PROCEDURE — 77067 SCR MAMMO BI INCL CAD: CPT | Performed by: RADIOLOGY

## 2025-05-09 PROCEDURE — 77080 DXA BONE DENSITY AXIAL: CPT

## 2025-05-09 PROCEDURE — 77063 BREAST TOMOSYNTHESIS BI: CPT | Performed by: RADIOLOGY

## 2025-05-10 ENCOUNTER — RESULTS FOLLOW-UP (OUTPATIENT)
Dept: FAMILY MEDICINE CLINIC | Facility: CLINIC | Age: 75
End: 2025-05-10
Payer: MEDICARE

## 2025-07-21 RX ORDER — HYDROCHLOROTHIAZIDE 25 MG/1
TABLET ORAL
Qty: 90 TABLET | Refills: 0 | Status: SHIPPED | OUTPATIENT
Start: 2025-07-21

## 2025-07-24 ENCOUNTER — EXTERNAL PBMM DATA (OUTPATIENT)
Dept: PHARMACY | Facility: OTHER | Age: 75
End: 2025-07-24
Payer: MEDICARE

## 2025-08-07 ENCOUNTER — EXTERNAL PBMM DATA (OUTPATIENT)
Dept: PHARMACY | Facility: OTHER | Age: 75
End: 2025-08-07
Payer: MEDICARE

## 2025-08-21 ENCOUNTER — EXTERNAL PBMM DATA (OUTPATIENT)
Dept: PHARMACY | Facility: OTHER | Age: 75
End: 2025-08-21
Payer: MEDICARE

## 2025-08-21 RX ORDER — METOPROLOL SUCCINATE 50 MG/1
TABLET, EXTENDED RELEASE ORAL
Qty: 90 TABLET | Refills: 0 | Status: SHIPPED | OUTPATIENT
Start: 2025-08-21

## 2025-08-22 RX ORDER — VALSARTAN 160 MG/1
TABLET ORAL
Qty: 90 TABLET | Refills: 0 | Status: SHIPPED | OUTPATIENT
Start: 2025-08-22

## 2025-08-28 ENCOUNTER — OFFICE VISIT (OUTPATIENT)
Dept: FAMILY MEDICINE CLINIC | Facility: CLINIC | Age: 75
End: 2025-08-28
Payer: MEDICARE

## 2025-08-28 ENCOUNTER — POP HEALTH PHARMACY (OUTPATIENT)
Dept: PHARMACY | Facility: OTHER | Age: 75
End: 2025-08-28
Payer: MEDICARE

## 2025-08-28 VITALS
TEMPERATURE: 96.9 F | SYSTOLIC BLOOD PRESSURE: 110 MMHG | HEIGHT: 65 IN | WEIGHT: 170 LBS | BODY MASS INDEX: 28.32 KG/M2 | DIASTOLIC BLOOD PRESSURE: 70 MMHG | RESPIRATION RATE: 16 BRPM | OXYGEN SATURATION: 97 % | HEART RATE: 59 BPM

## 2025-08-28 DIAGNOSIS — M85.89 OSTEOPENIA OF MULTIPLE SITES: ICD-10-CM

## 2025-08-28 DIAGNOSIS — E78.2 MIXED HYPERLIPIDEMIA: ICD-10-CM

## 2025-08-28 DIAGNOSIS — E03.9 PRIMARY HYPOTHYROIDISM: ICD-10-CM

## 2025-08-28 DIAGNOSIS — R73.01 IMPAIRED FASTING GLUCOSE: ICD-10-CM

## 2025-08-28 DIAGNOSIS — R00.2 PALPITATIONS: ICD-10-CM

## 2025-08-28 DIAGNOSIS — M25.50 MULTIPLE JOINT PAIN: ICD-10-CM

## 2025-08-28 DIAGNOSIS — E55.9 VITAMIN D DEFICIENCY: ICD-10-CM

## 2025-08-28 DIAGNOSIS — I10 BENIGN ESSENTIAL HYPERTENSION: Primary | ICD-10-CM

## 2025-08-28 RX ORDER — HYDROCHLOROTHIAZIDE 25 MG/1
25 TABLET ORAL DAILY
Qty: 90 TABLET | Refills: 1 | Status: SHIPPED | OUTPATIENT
Start: 2025-08-28

## 2025-08-28 RX ORDER — VALSARTAN 160 MG/1
TABLET ORAL
Qty: 90 TABLET | Refills: 1 | Status: SHIPPED | OUTPATIENT
Start: 2025-08-28

## 2025-08-28 RX ORDER — METOPROLOL SUCCINATE 50 MG/1
50 TABLET, EXTENDED RELEASE ORAL DAILY
Qty: 90 TABLET | Refills: 3 | Status: SHIPPED | OUTPATIENT
Start: 2025-08-28

## 2025-08-29 LAB
ALBUMIN SERPL-MCNC: 4.5 G/DL (ref 3.5–5.2)
ALBUMIN/GLOB SERPL: 2 G/DL
ALP SERPL-CCNC: 73 U/L (ref 39–117)
ALT SERPL-CCNC: 12 U/L (ref 1–33)
ANA SER QL: NEGATIVE
AST SERPL-CCNC: 22 U/L (ref 1–32)
BASOPHILS # BLD AUTO: 0.07 10*3/MM3 (ref 0–0.2)
BASOPHILS NFR BLD AUTO: 0.8 % (ref 0–1.5)
BILIRUB SERPL-MCNC: 0.9 MG/DL (ref 0–1.2)
BUN SERPL-MCNC: 23 MG/DL (ref 8–23)
BUN/CREAT SERPL: 20 (ref 7–25)
CALCIUM SERPL-MCNC: 9.9 MG/DL (ref 8.6–10.5)
CHLORIDE SERPL-SCNC: 102 MMOL/L (ref 98–107)
CO2 SERPL-SCNC: 25.7 MMOL/L (ref 22–29)
CREAT SERPL-MCNC: 1.15 MG/DL (ref 0.57–1)
CRP SERPL-MCNC: <0.3 MG/DL (ref 0–0.5)
EGFRCR SERPLBLD CKD-EPI 2021: 49.5 ML/MIN/1.73
EOSINOPHIL # BLD AUTO: 0.27 10*3/MM3 (ref 0–0.4)
EOSINOPHIL NFR BLD AUTO: 3.2 % (ref 0.3–6.2)
ERYTHROCYTE [DISTWIDTH] IN BLOOD BY AUTOMATED COUNT: 11.7 % (ref 12.3–15.4)
ERYTHROCYTE [SEDIMENTATION RATE] IN BLOOD BY WESTERGREN METHOD: 4 MM/HR (ref 0–30)
GLOBULIN SER CALC-MCNC: 2.2 GM/DL
GLUCOSE SERPL-MCNC: 93 MG/DL (ref 65–99)
HBA1C MFR BLD: 5.6 % (ref 4.8–5.6)
HCT VFR BLD AUTO: 38.8 % (ref 34–46.6)
HGB BLD-MCNC: 13 G/DL (ref 12–15.9)
IMM GRANULOCYTES # BLD AUTO: 0.03 10*3/MM3 (ref 0–0.05)
IMM GRANULOCYTES NFR BLD AUTO: 0.4 % (ref 0–0.5)
LYMPHOCYTES # BLD AUTO: 2.36 10*3/MM3 (ref 0.7–3.1)
LYMPHOCYTES NFR BLD AUTO: 28.2 % (ref 19.6–45.3)
MCH RBC QN AUTO: 32.6 PG (ref 26.6–33)
MCHC RBC AUTO-ENTMCNC: 33.5 G/DL (ref 31.5–35.7)
MCV RBC AUTO: 97.2 FL (ref 79–97)
MONOCYTES # BLD AUTO: 0.62 10*3/MM3 (ref 0.1–0.9)
MONOCYTES NFR BLD AUTO: 7.4 % (ref 5–12)
NEUTROPHILS # BLD AUTO: 5.01 10*3/MM3 (ref 1.7–7)
NEUTROPHILS NFR BLD AUTO: 60 % (ref 42.7–76)
NRBC BLD AUTO-RTO: 0 /100 WBC (ref 0–0.2)
PLATELET # BLD AUTO: 236 10*3/MM3 (ref 140–450)
POTASSIUM SERPL-SCNC: 4.6 MMOL/L (ref 3.5–5.2)
PROT SERPL-MCNC: 6.7 G/DL (ref 6–8.5)
RBC # BLD AUTO: 3.99 10*6/MM3 (ref 3.77–5.28)
RHEUMATOID FACT SERPL-ACNC: 10.4 IU/ML
SODIUM SERPL-SCNC: 140 MMOL/L (ref 136–145)
WBC # BLD AUTO: 8.36 10*3/MM3 (ref 3.4–10.8)